# Patient Record
Sex: FEMALE | NOT HISPANIC OR LATINO | Employment: FULL TIME | URBAN - METROPOLITAN AREA
[De-identification: names, ages, dates, MRNs, and addresses within clinical notes are randomized per-mention and may not be internally consistent; named-entity substitution may affect disease eponyms.]

---

## 2017-04-27 ENCOUNTER — TELEPHONE (OUTPATIENT)
Dept: INTERNAL MEDICINE | Facility: CLINIC | Age: 52
End: 2017-04-27

## 2017-04-27 NOTE — TELEPHONE ENCOUNTER
4/27/2017    Call Regarding Preventive Health Screening Colonoscopy    Attempt 1    Message on voicemail     Comments:       Outreach   cnt

## 2017-05-02 NOTE — TELEPHONE ENCOUNTER
5/2/2017    Call Regarding Preventive Health Screening Colonoscopy    Attempt 2    Message on voicemail     Comments:       Outreach   CC

## 2017-05-08 NOTE — TELEPHONE ENCOUNTER
5/8/2017    Call Regarding Preventive Health Screening Colonoscopy    Attempt 3    Message on voicemail     Comments:       Outreach   diana

## 2017-06-27 ENCOUNTER — OFFICE VISIT (OUTPATIENT)
Dept: INTERNAL MEDICINE | Facility: CLINIC | Age: 52
End: 2017-06-27
Payer: COMMERCIAL

## 2017-06-27 ENCOUNTER — RADIANT APPOINTMENT (OUTPATIENT)
Dept: MAMMOGRAPHY | Facility: CLINIC | Age: 52
End: 2017-06-27
Attending: INTERNAL MEDICINE
Payer: COMMERCIAL

## 2017-06-27 VITALS
HEIGHT: 63 IN | BODY MASS INDEX: 33.89 KG/M2 | TEMPERATURE: 97.8 F | SYSTOLIC BLOOD PRESSURE: 120 MMHG | HEART RATE: 101 BPM | WEIGHT: 191.3 LBS | DIASTOLIC BLOOD PRESSURE: 82 MMHG | OXYGEN SATURATION: 98 %

## 2017-06-27 DIAGNOSIS — Z00.00 ENCOUNTER FOR ROUTINE ADULT HEALTH EXAMINATION WITHOUT ABNORMAL FINDINGS: Primary | ICD-10-CM

## 2017-06-27 DIAGNOSIS — Z12.31 VISIT FOR SCREENING MAMMOGRAM: ICD-10-CM

## 2017-06-27 DIAGNOSIS — I10 ESSENTIAL HYPERTENSION, BENIGN: ICD-10-CM

## 2017-06-27 DIAGNOSIS — Z12.11 COLON CANCER SCREENING: ICD-10-CM

## 2017-06-27 DIAGNOSIS — Z65.9 OTHER SOCIAL STRESSOR: ICD-10-CM

## 2017-06-27 DIAGNOSIS — Z11.59 NEED FOR HEPATITIS C SCREENING TEST: ICD-10-CM

## 2017-06-27 DIAGNOSIS — E78.5 HYPERLIPIDEMIA LDL GOAL <160: ICD-10-CM

## 2017-06-27 LAB
ALBUMIN SERPL-MCNC: 3.5 G/DL (ref 3.4–5)
ALP SERPL-CCNC: 79 U/L (ref 40–150)
ALT SERPL W P-5'-P-CCNC: 23 U/L (ref 0–50)
ANION GAP SERPL CALCULATED.3IONS-SCNC: 7 MMOL/L (ref 3–14)
AST SERPL W P-5'-P-CCNC: 11 U/L (ref 0–45)
BILIRUB SERPL-MCNC: 0.4 MG/DL (ref 0.2–1.3)
BUN SERPL-MCNC: 18 MG/DL (ref 7–30)
CALCIUM SERPL-MCNC: 8.8 MG/DL (ref 8.5–10.1)
CHLORIDE SERPL-SCNC: 106 MMOL/L (ref 94–109)
CHOLEST SERPL-MCNC: 172 MG/DL
CO2 SERPL-SCNC: 28 MMOL/L (ref 20–32)
CREAT SERPL-MCNC: 0.83 MG/DL (ref 0.52–1.04)
GFR SERPL CREATININE-BSD FRML MDRD: 72 ML/MIN/1.7M2
GLUCOSE SERPL-MCNC: 104 MG/DL (ref 70–99)
HCV AB SERPL QL IA: NORMAL
HDLC SERPL-MCNC: 47 MG/DL
HGB BLD-MCNC: 13.3 G/DL (ref 11.7–15.7)
LDLC SERPL CALC-MCNC: 85 MG/DL
NONHDLC SERPL-MCNC: 125 MG/DL
POTASSIUM SERPL-SCNC: 3.7 MMOL/L (ref 3.4–5.3)
PROT SERPL-MCNC: 7.7 G/DL (ref 6.8–8.8)
SODIUM SERPL-SCNC: 141 MMOL/L (ref 133–144)
TRIGL SERPL-MCNC: 199 MG/DL

## 2017-06-27 PROCEDURE — 99396 PREV VISIT EST AGE 40-64: CPT | Performed by: INTERNAL MEDICINE

## 2017-06-27 PROCEDURE — 80061 LIPID PANEL: CPT | Performed by: INTERNAL MEDICINE

## 2017-06-27 PROCEDURE — 36415 COLL VENOUS BLD VENIPUNCTURE: CPT | Performed by: INTERNAL MEDICINE

## 2017-06-27 PROCEDURE — 86803 HEPATITIS C AB TEST: CPT | Performed by: INTERNAL MEDICINE

## 2017-06-27 PROCEDURE — 80053 COMPREHEN METABOLIC PANEL: CPT | Performed by: INTERNAL MEDICINE

## 2017-06-27 PROCEDURE — G0202 SCR MAMMO BI INCL CAD: HCPCS | Mod: TC

## 2017-06-27 PROCEDURE — 85018 HEMOGLOBIN: CPT | Performed by: INTERNAL MEDICINE

## 2017-06-27 NOTE — PROGRESS NOTES
SUBJECTIVE:   CC: Jimmy Stevens is an 51 year old woman who presents for preventive health visit.     Healthy Habits:    Do you get at least three servings of calcium containing foods daily (dairy, green leafy vegetables, etc.)? yes    Amount of exercise or daily activities, outside of work: 2 day(s) per week    Problems taking medications regularly No    Medication side effects: No    Have you had an eye exam in the past two years? yes    Do you see a dentist twice per year? yes  Do you have sleep apnea, excessive snoring or daytime drowsiness?no      PROBLEMS TO ADD ON...  1. Left lower back pain that burns and radiates up into arm area and down left leg. Also experiences bilateral leg pains x 2+ months   2.  She still has issues with her ex .    Today's PHQ-2 Score:   PHQ-2 ( 1999 [employer]) 6/27/2017 10/13/2016   Q1: Little interest or pleasure in doing things 0 0   Q2: Feeling down, depressed or hopeless 0 0   PHQ-2 Score 0 0       Abuse: Current or Past(Physical, Sexual or Emotional)- No  Do you feel safe in your environment - Yes    Social History   Substance Use Topics     Smoking status: Never Smoker     Smokeless tobacco: Never Used     Alcohol use No     The patient does not drink >3 drinks per day nor >7 drinks per week.    Reviewed orders with patient.  Reviewed health maintenance and updated orders accordingly - Yes    Patient over age 50, mutual decision to screen reflected in health maintenance.    Pertinent mammograms are reviewed under the imaging tab.  History of abnormal Pap smear: NO - age 30- 65 PAP every 3 years recommended    Reviewed and updated as needed this visit by clinical staff  Tobacco  Allergies  Med Hx  Surg Hx  Fam Hx  Soc Hx        Reviewed and updated as needed this visit by Provider         ROS:  C: NEGATIVE for fever, chills, change in weight  ENT: NEGATIVE for ear, mouth and throat problems  R: NEGATIVE for significant cough or SOB  CV: NEGATIVE for chest  "pain, palpitations or peripheral edema  GI: NEGATIVE for nausea, abdominal pain, heartburn, or change in bowel habits  : NEGATIVE for unusual urinary or vaginal symptoms. No vaginal bleeding.  M: NEGATIVE for significant arthralgias or myalgia  P: NEGATIVE for changes in mood or affect less anxiety.    OBJECTIVE:   /82  Pulse 101  Temp 97.8  F (36.6  C) (Oral)  Ht 5' 3\" (1.6 m)  Wt 191 lb 4.8 oz (86.8 kg)  SpO2 98%  BMI 33.89 kg/m2  EXAM:  GENERAL APPEARANCE: healthy, alert and no distress  EYES: Eyes grossly normal to inspection, PERRL and conjunctivae and sclerae normal  HENT: ear canals and TM's normal, nose and mouth without ulcers or lesions, oropharynx clear and oral mucous membranes moist  NECK: no adenopathy, no asymmetry, masses, or scars and thyroid normal to palpation  RESP: lungs clear to auscultation - no rales, rhonchi or wheezes  BREAST: deferred per patient  CV: regular rate and rhythm, normal S1 S2, no S3 or S4, no click or rub, no peripheral edema and peripheral pulses strong  ABDOMEN: soft, nontender, no hepatosplenomegaly, no masses and bowel sounds normal  MS: no musculoskeletal defects are noted and gait is age appropriate without ataxia  PSYCH: anxiousness noted    ASSESSMENT/PLAN:   1. Encounter for routine adult health examination without abnormal findings  As ordered  - Hemoglobin  - Lipid Profile    2. Essential hypertension, benign  Stable on therapy  - Comprehensive metabolic panel    3. Hyperlipidemia LDL goal <160  Labs as fasting  - Comprehensive metabolic panel  - Lipid Profile    4. Visit for screening mammogram  Ordered as screening  - *MA Screening Digital Bilateral; Future    5. Colon cancer screening  ADVISED COLONOSCOPY FOR ROUTINE PREVENTATIVE CARE.  - Fecal colorectal cancer screen (FIT); Future    6. Other social stressor  patietn still having issues with her ex  and advised that she really should be considering counseling for issues there of    7. Need " "for hepatitis C screening test  As screening  - Hepatitis C antibody    COUNSELING:   Reviewed preventive health counseling, as reflected in patient instructions       Regular exercise       Healthy diet/nutrition     reports that she has never smoked. She has never used smokeless tobacco.    Estimated body mass index is 33.66 kg/(m^2) as calculated from the following:    Height as of 10/13/16: 5' 3\" (1.6 m).    Weight as of 10/13/16: 190 lb (86.2 kg).       Counseling Resources:  ATP IV Guidelines  Pooled Cohorts Equation Calculator  Breast Cancer Risk Calculator  FRAX Risk Assessment  ICSI Preventive Guidelines  Dietary Guidelines for Americans, 2010  Colto's MyPlate  ASA Prophylaxis  Lung CA Screening    Nick Calderon MD  [facility]    THE MEDICATION LIST HAS BEEN FULLY RECONCILED BY THE M.D. AND THE NURSING STAFF.    "

## 2017-06-27 NOTE — MR AVS SNAPSHOT
After Visit Summary   6/27/2017    Jimmy Stevens    MRN: [id number]           Patient Information     Date Of Birth          1965        Visit Information        Provider Department      6/27/2017 8:40 AM Nick Calderon MD [facility]        Today's Diagnoses     Encounter for routine adult health examination without abnormal findings    -  1    Essential hypertension, benign        Hyperlipidemia LDL goal <160        Visit for screening mammogram        Colon cancer screening        Other social stressor        Need for hepatitis C screening test          Care Instructions      Preventive Health Recommendations  Female Ages 50 - 64    Yearly exam: See your health care provider every year in order to  o Review health changes.   o Discuss preventive care.    o Review your medicines if your doctor has prescribed any.      Get a Pap test every three years (unless you have an abnormal result and your provider advises testing more often).    If you get Pap tests with HPV test, you only need to test every 5 years, unless you have an abnormal result.     You do not need a Pap test if your uterus was removed (hysterectomy) and you have not had cancer.    You should be tested each year for STDs (sexually transmitted diseases) if you're at risk.     Have a mammogram every 1 to 2 years.    Have a colonoscopy at age 50, or have a yearly FIT test (stool test). These exams screen for colon cancer.      Have a cholesterol test every 5 years, or more often if advised.    Have a diabetes test (fasting glucose) every three years. If you are at risk for diabetes, you should have this test more often.     If you are at risk for osteoporosis (brittle bone disease), think about having a bone density scan (DEXA).    Shots: Get a flu shot each year. Get a tetanus shot every 10 years.    Nutrition:     Eat at least 5 servings of fruits and vegetables each day.    Eat whole-grain bread,  whole-wheat pasta and brown rice instead of white grains and rice.    Talk to your provider about Calcium and Vitamin D.     Lifestyle    Exercise at least 150 minutes a week (30 minutes a day, 5 days a week). This will help you control your weight and prevent disease.    Limit alcohol to one drink per day.    No smoking.     Wear sunscreen to prevent skin cancer.     See your dentist every six months for an exam and cleaning.    See your eye doctor every 1 to 2 years.            Follow-ups after your visit        Future tests that were ordered for you today     Open Future Orders        Priority Expected Expires Ordered    Fecal colorectal cancer screen (FIT) Routine 7/18/2017 9/19/2017 6/27/2017            Who to contact     If you have questions or need follow up information about today's clinic visit or your schedule please contact [facility] directly at [phone].  Normal or non-critical lab and imaging results will be communicated to you by Konterahart, letter or phone within 4 business days after the clinic has received the results. If you do not hear from us within 7 days, please contact the clinic through Trendratingt or phone. If you have a critical or abnormal lab result, we will notify you by phone as soon as possible.  Submit refill requests through Locaweb or call your pharmacy and they will forward the refill request to us. Please allow 3 business days for your refill to be completed.          Additional Information About Your Visit        MyChart Information     Locaweb gives you secure access to your electronic health record. If you see a primary care provider, you can also send messages to your care team and make appointments. If you have questions, please call your primary care clinic.  If you do not have a primary care provider, please call [phone] and they will assist you.        Care EveryWhere ID     This is your Care EveryWhere ID. This could be used by other  "organizations to access your [facility] medical records  [id number]        Your Vitals Were     Pulse Temperature Height Pulse Oximetry BMI (Body Mass Index)       101 97.8  F (36.6  C) (Oral) 5' 3\" (1.6 m) 98% 33.89 kg/m2        Blood Pressure from Last 3 Encounters:   06/27/17 120/82   10/17/16 (!) 140/102   10/13/16 136/84    Weight from Last 3 Encounters:   06/27/17 191 lb 4.8 oz (86.8 kg)   10/13/16 190 lb (86.2 kg)   06/26/16 175 lb (79.4 kg)              We Performed the Following     Comprehensive metabolic panel     Hemoglobin     Hepatitis C antibody     Lipid Profile        Primary Care Provider Office Phone # Fax #    Nick Calderon [phone]       [facility] [address] [facility] [address]        Equal Access to Services     SANDY ABBASI : Hadii aad ku hadasho Soomaali, waaxda luqadaha, qaybta kaalmada adeegyada, waxay idiin hayaan alka adam . So [facility] [phone].    ATENCIÓN: Si habla español, tiene a de la torre disposición servicios gratuitos de asistencia lingüística. Llame al [phone].    We comply with applicable federal civil rights laws and [place] laws. We do not discriminate on the basis of race, color, national origin, age, disability sex, sexual orientation or gender identity.            Thank you!     Thank you for choosing [facility]  for your care. Our goal is always to provide you with excellent care. Hearing back from our patients is one way we can continue to improve our services. Please take a few minutes to complete the written survey that you may receive in the mail after your visit with us. Thank you!             Your Updated Medication List - Protect others around you: Learn how to safely use, store and throw away your medicines at www.disposemymeds.org.          This list is accurate as of: 6/27/17  9:45 AM.  Always use your most recent med list.                   Brand Name Dispense Instructions for use " Diagnosis    fluticasone 50 MCG/ACT spray    FLONASE    1 Bottle    Spray 1-2 sprays into both nostrils daily    Sinus disorder

## 2017-06-27 NOTE — LETTER
[facility]  [address]  [address]      June 28, 2017      Jimmy Stevens  [address]   [place] [facility] [address]          Dear Jimmy,      I have enclosed a copy of your most recent labs done here at the clinic and if available some of your prior labs for comparison.     I am pleased to inform you that your routine blood work including your hemoglobin, hepatitis C, sodium, potassium, calcium, kidney and liver function tests are all normal.    Your cholesterol looks good and I would not change anything at this point but would repeat your labs in 12 months.    Your blood sugar function tests are slightly abnormal and elevated and should be rechecked here in the clinic in 6 months with a follow-up visit with me, fasting.  I will look forward to seeing you at that time and please call to make an appointment.  In the meantime, please work on your diet limiting your carbohydrates and getting some good, regular exercise.    Please call me if you have further questions.        Nick Calderon MD

## 2017-06-27 NOTE — NURSING NOTE
"Chief Complaint   Patient presents with     Physical       Initial /82  Pulse 101  Temp 97.8  F (36.6  C) (Oral)  Ht 5' 3\" (1.6 m)  Wt 191 lb 4.8 oz (86.8 kg)  SpO2 98%  BMI 33.89 kg/m2 Estimated body mass index is 33.89 kg/(m^2) as calculated from the following:    Height as of this encounter: 5' 3\" (1.6 m).    Weight as of this encounter: 191 lb 4.8 oz (86.8 kg).  Medication Reconciliation: complete   Madeleine Bradley CMA      "

## 2017-06-28 PROCEDURE — 82274 ASSAY TEST FOR BLOOD FECAL: CPT | Performed by: INTERNAL MEDICINE

## 2017-06-30 DIAGNOSIS — Z12.11 COLON CANCER SCREENING: ICD-10-CM

## 2017-06-30 LAB — HEMOCCULT STL QL IA: NEGATIVE

## 2017-11-29 ENCOUNTER — RADIANT APPOINTMENT (OUTPATIENT)
Dept: GENERAL RADIOLOGY | Facility: CLINIC | Age: 52
End: 2017-11-29
Attending: INTERNAL MEDICINE
Payer: COMMERCIAL

## 2017-11-29 ENCOUNTER — OFFICE VISIT (OUTPATIENT)
Dept: INTERNAL MEDICINE | Facility: CLINIC | Age: 52
End: 2017-11-29
Payer: COMMERCIAL

## 2017-11-29 VITALS
HEART RATE: 105 BPM | OXYGEN SATURATION: 98 % | BODY MASS INDEX: 33.92 KG/M2 | SYSTOLIC BLOOD PRESSURE: 122 MMHG | TEMPERATURE: 98 F | DIASTOLIC BLOOD PRESSURE: 80 MMHG | WEIGHT: 191.5 LBS

## 2017-11-29 DIAGNOSIS — I10 ESSENTIAL HYPERTENSION, BENIGN: Primary | ICD-10-CM

## 2017-11-29 DIAGNOSIS — M54.6 LEFT-SIDED THORACIC BACK PAIN, UNSPECIFIED CHRONICITY: ICD-10-CM

## 2017-11-29 DIAGNOSIS — M25.561 CHRONIC PAIN OF RIGHT KNEE: ICD-10-CM

## 2017-11-29 DIAGNOSIS — G89.29 CHRONIC PAIN OF RIGHT KNEE: ICD-10-CM

## 2017-11-29 LAB
ANION GAP SERPL CALCULATED.3IONS-SCNC: 6 MMOL/L (ref 3–14)
BUN SERPL-MCNC: 19 MG/DL (ref 7–30)
CALCIUM SERPL-MCNC: 9.1 MG/DL (ref 8.5–10.1)
CHLORIDE SERPL-SCNC: 106 MMOL/L (ref 94–109)
CO2 SERPL-SCNC: 29 MMOL/L (ref 20–32)
CREAT SERPL-MCNC: 0.76 MG/DL (ref 0.52–1.04)
GFR SERPL CREATININE-BSD FRML MDRD: 80 ML/MIN/1.7M2
GLUCOSE SERPL-MCNC: 107 MG/DL (ref 70–99)
POTASSIUM SERPL-SCNC: 4.3 MMOL/L (ref 3.4–5.3)
SODIUM SERPL-SCNC: 141 MMOL/L (ref 133–144)

## 2017-11-29 PROCEDURE — 73560 X-RAY EXAM OF KNEE 1 OR 2: CPT | Mod: RT

## 2017-11-29 PROCEDURE — 36415 COLL VENOUS BLD VENIPUNCTURE: CPT | Performed by: INTERNAL MEDICINE

## 2017-11-29 PROCEDURE — 80048 BASIC METABOLIC PNL TOTAL CA: CPT | Performed by: INTERNAL MEDICINE

## 2017-11-29 PROCEDURE — 99214 OFFICE O/P EST MOD 30 MIN: CPT | Performed by: INTERNAL MEDICINE

## 2017-11-29 RX ORDER — OMEGA-3 FATTY ACIDS/FISH OIL 300-1000MG
200 CAPSULE ORAL PRN
COMMUNITY
End: 2018-10-25

## 2017-11-29 NOTE — NURSING NOTE
"Chief Complaint   Patient presents with     Glucose       Initial /80  Pulse 105  Temp 98  F (36.7  C) (Oral)  Wt 191 lb 8 oz (86.9 kg)  SpO2 98%  BMI 33.92 kg/m2 Estimated body mass index is 33.92 kg/(m^2) as calculated from the following:    Height as of 6/27/17: 5' 3\" (1.6 m).    Weight as of this encounter: 191 lb 8 oz (86.9 kg).  Medication Reconciliation: complete   Madeleine Bradley, JALEN      "

## 2017-11-29 NOTE — PROGRESS NOTES
SUBJECTIVE:   Jimmy Stevens is a 52 year old female who presents to clinic today for the following health issues:    Recheck glucose from 6/27/17    Other concerns:  1. Intermittent episodes of burning sensation on left side of back that radiates down    Problem list and histories reviewed & adjusted, as indicated.  Additional history: as documented    Patient Active Problem List   Diagnosis     iamCERVICALGIA     iamTENSION HEADACHE     iamPAIN IN LIMB     Acute stress reaction     Knee pain     HYPERLIPIDEMIA LDL GOAL <160     Essential hypertension, benign     Past Surgical History:   Procedure Laterality Date     C NONSPECIFIC PROCEDURE  10/29/99    MRI brain normal     C NONSPECIFIC PROCEDURE  02/00    B breast reduction surgery     C TOTAL ABDOM HYSTERECTOMY  3/99    Hysterectomy, Total Abdominal     HC REMOVAL OF OVARY/TUBE(S)  3/99    Salpingo-Oophorectomy, Unilateral     HYSTERECTOMY, PAP NO LONGER INDICATED  3/99       Social History   Substance Use Topics     Smoking status: Never Smoker     Smokeless tobacco: Never Used     Alcohol use No     Family History   Problem Relation Age of Onset     Genitourinary Problems Mother      B:1942 Alive     Family History Negative Father      B:1940 Alive     Family History Negative Sister      5 sisters all healthy     Family History Negative Brother      1 brother healthy         Current Outpatient Prescriptions   Medication Sig Dispense Refill     fluticasone (FLONASE) 50 MCG/ACT nasal spray Spray 1-2 sprays into both nostrils daily 1 Bottle 3     Allergies   Allergen Reactions     H2 Antagonists      Mylan (itching)     Minocycline      BP Readings from Last 3 Encounters:   06/27/17 120/82   10/17/16 (!) 140/102   10/13/16 136/84    Wt Readings from Last 3 Encounters:   06/27/17 191 lb 4.8 oz (86.8 kg)   10/13/16 190 lb (86.2 kg)   06/26/16 175 lb (79.4 kg)         Labs reviewed in EPIC    Reviewed and updated as needed this visit by clinical staffTobacco   Allergies  Med Hx  Surg Hx  Fam Hx  Soc Hx      Reviewed and updated as needed this visit by Provider         ROS:  C: NEGATIVE for fever, chills, change in weight  E/M: NEGATIVE for ear, mouth and throat problems  R: NEGATIVE for significant cough or SOB  CV: NEGATIVE for chest pain, palpitations or peripheral edema  GI: NEGATIVE for nausea, abdominal pain, heartburn, or change in bowel habits  : NEGATIVE for frequency, dysuria, or hematuria  M: NEGATIVE for significant arthralgias or myalgia less R>L knee with noted pain and noted prior steroid shot in [facility] 5 years ago.    H: NEGATIVE for bleeding problems  P: NEGATIVE for changes in mood or affect less stressors from prior marriage.    OBJECTIVE:                                                    /80  Pulse 105  Temp 98  F (36.7  C) (Oral)  Wt 191 lb 8 oz (86.9 kg)  SpO2 98%  BMI 33.92 kg/m2  Body mass index is 33.92 kg/(m^2).  GENERAL: alert and no distress  EYES: Eyes grossly normal to inspection, extraocular movements - intact, and PERRL  HENT: ear canals- normal; TMs- normal; Nose- normal; Mouth- no ulcers, no lesions  NECK: no tenderness, no adenopathy, no asymmetry, no masses, no stiffness; thyroid- normal to palpation  RESP: lungs clear to auscultation - no rales, no rhonchi, no wheezes  CV: regular rates and rhythm, normal S1 S2, no S3 or S4 and no click or rub   MS: extremities- no gross deformities noted less milt antalgic gait, R>L knee, no edema less mild L>R thoracic back pain  NEURO: no focal changes  PSYCH: Alert and oriented times 3; speech- coherent , normal rate and volume; able to articulate logical thoughts, able to abstract reason, no tangential thoughts, no hallucinations or delusions, affect- normal     ASSESSMENT/PLAN:                                                      (I10) Essential hypertension, benign  (primary encounter diagnosis)  Comment: stable as noted on dietary therapy  Plan: Basic metabolic panel             (M54.6) Left-sided thoracic back pain, unspecified chronicity  Comment: suggested trial of PT and she has agreed  Plan: [facility], AND CHIROPRACTIC REFERRAL            (M25.561,  G89.29) Chronic pain of right knee  Comment: noted DJD changes on Xray dated 2006, suspect worse now, may benefit fro injection therapy  Plan: ORTHO  REFERRAL, XR Knee Right 1/2         Views            ADVISED FLU shot and COLONOSCOPY FOR ROUTINE PREVENTATIVE CARE.    See Patient Instructions    Nick Calderon MD  [facility]    THE MEDICATION LIST HAS BEEN FULLY RECONCILED BY THE M.D. AND THE NURSING STAFF.    25 minutes spent with this patient, face to face, discussing treatment options for listed problems above as well as side effects of appropriate medications.  Counseling time extended beyond 50% of the clinic visit.  Medication dosing, treatment plan and follow-up were discussed. Also reviewed need for primary care testing for patient.

## 2017-11-29 NOTE — LETTER
[facility]  [address] [address]  [phone]      11/29/2017       Jimmy Stevens  [address]  [place] [place] [address]        Dear Jimmy,    Your basic panel is stable.    Your blood sugar function test remains slightly abnormal and elevated and should be rechecked here in the clinic in 6 months with a follow-up visit with me, fasting.  I will look forward to seeing you at that time and please call to make an appointment.  In the meantime, please work on your diet limiting your carbohydrates and getting some good, regular exercise.    Sincerely,      Nick Calderon MD  Internal Medicine
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
## 2018-10-25 ENCOUNTER — OFFICE VISIT (OUTPATIENT)
Dept: INTERNAL MEDICINE | Facility: CLINIC | Age: 53
End: 2018-10-25
Payer: COMMERCIAL

## 2018-10-25 VITALS
OXYGEN SATURATION: 96 % | WEIGHT: 194.7 LBS | TEMPERATURE: 98.1 F | BODY MASS INDEX: 34.5 KG/M2 | HEIGHT: 63 IN | SYSTOLIC BLOOD PRESSURE: 130 MMHG | RESPIRATION RATE: 14 BRPM | DIASTOLIC BLOOD PRESSURE: 78 MMHG | HEART RATE: 106 BPM

## 2018-10-25 DIAGNOSIS — Z12.31 VISIT FOR SCREENING MAMMOGRAM: ICD-10-CM

## 2018-10-25 DIAGNOSIS — Z12.11 SCREEN FOR COLON CANCER: ICD-10-CM

## 2018-10-25 DIAGNOSIS — I10 ESSENTIAL HYPERTENSION, BENIGN: ICD-10-CM

## 2018-10-25 DIAGNOSIS — Z23 NEED FOR PROPHYLACTIC VACCINATION AND INOCULATION AGAINST INFLUENZA: ICD-10-CM

## 2018-10-25 DIAGNOSIS — Z00.00 ENCOUNTER FOR ROUTINE ADULT HEALTH EXAMINATION WITHOUT ABNORMAL FINDINGS: Primary | ICD-10-CM

## 2018-10-25 DIAGNOSIS — E78.5 HYPERLIPIDEMIA LDL GOAL <160: ICD-10-CM

## 2018-10-25 DIAGNOSIS — Z12.4 SCREENING FOR MALIGNANT NEOPLASM OF CERVIX: ICD-10-CM

## 2018-10-25 LAB
ALBUMIN SERPL-MCNC: 3.6 G/DL (ref 3.4–5)
ALP SERPL-CCNC: 70 U/L (ref 40–150)
ALT SERPL W P-5'-P-CCNC: 22 U/L (ref 0–50)
ANION GAP SERPL CALCULATED.3IONS-SCNC: 7 MMOL/L (ref 3–14)
AST SERPL W P-5'-P-CCNC: 12 U/L (ref 0–45)
BILIRUB SERPL-MCNC: 0.5 MG/DL (ref 0.2–1.3)
BUN SERPL-MCNC: 11 MG/DL (ref 7–30)
CALCIUM SERPL-MCNC: 9.1 MG/DL (ref 8.5–10.1)
CHLORIDE SERPL-SCNC: 104 MMOL/L (ref 94–109)
CHOLEST SERPL-MCNC: 157 MG/DL
CO2 SERPL-SCNC: 30 MMOL/L (ref 20–32)
CREAT SERPL-MCNC: 0.78 MG/DL (ref 0.52–1.04)
GFR SERPL CREATININE-BSD FRML MDRD: 77 ML/MIN/1.7M2
GLUCOSE SERPL-MCNC: 100 MG/DL (ref 70–99)
HDLC SERPL-MCNC: 40 MG/DL
HGB BLD-MCNC: 13.5 G/DL (ref 11.7–15.7)
LDLC SERPL CALC-MCNC: 82 MG/DL
NONHDLC SERPL-MCNC: 117 MG/DL
POTASSIUM SERPL-SCNC: 4.2 MMOL/L (ref 3.4–5.3)
PROT SERPL-MCNC: 7.9 G/DL (ref 6.8–8.8)
SODIUM SERPL-SCNC: 141 MMOL/L (ref 133–144)
TRIGL SERPL-MCNC: 174 MG/DL

## 2018-10-25 PROCEDURE — 99396 PREV VISIT EST AGE 40-64: CPT | Performed by: INTERNAL MEDICINE

## 2018-10-25 PROCEDURE — 36415 COLL VENOUS BLD VENIPUNCTURE: CPT | Performed by: INTERNAL MEDICINE

## 2018-10-25 PROCEDURE — 80053 COMPREHEN METABOLIC PANEL: CPT | Performed by: INTERNAL MEDICINE

## 2018-10-25 PROCEDURE — 85018 HEMOGLOBIN: CPT | Performed by: INTERNAL MEDICINE

## 2018-10-25 PROCEDURE — 80061 LIPID PANEL: CPT | Performed by: INTERNAL MEDICINE

## 2018-10-25 NOTE — LETTER
[facility]  [address] [address]  [phone]      10/25/2018       Jimmy Stevens  [address]  [place] [address]        Dear Jimmy,    I am pleased to inform you that your routine blood work including your hemoglobin, sodium, potassium, calcium, kidney and liver function tests are all normal.    Your cholesterol looks stable and I would not change anything at this point but would repeat your labs in 12 months.    Your blood sugar function tests are slightly abnormal and elevated and should be rechecked here in the clinic in 12 months with a follow-up visit with me, fasting.  I will look forward to seeing you at that time and please call to make an appointment.  In the meantime, please work on your diet limiting your carbohydrates and getting some good, regular exercise.    Sincerely,      Nick Calderon MD  Internal Medicine

## 2018-10-25 NOTE — MR AVS SNAPSHOT
After Visit Summary   10/25/2018    Jimmy Stevens    MRN: [id number]           Patient Information     Date Of Birth          1965        Visit Information        Provider Department      10/25/2018 7:20 AM Nick Calderon MD [facility]        Today's Diagnoses     Encounter for routine adult health examination without abnormal findings    -  1    Essential hypertension, benign        Hyperlipidemia LDL goal <160        Screen for colon cancer        Screening for malignant neoplasm of cervix        Need for prophylactic vaccination and inoculation against influenza        Visit for screening mammogram          Care Instructions      Preventive Health Recommendations  Female Ages 50 - 64    Yearly exam: See your health care provider every year in order to  o Review health changes.   o Discuss preventive care.    o Review your medicines if your doctor has prescribed any.      Get a Pap test every three years (unless you have an abnormal result and your provider advises testing more often).    If you get Pap tests with HPV test, you only need to test every 5 years, unless you have an abnormal result.     You do not need a Pap test if your uterus was removed (hysterectomy) and you have not had cancer.    You should be tested each year for STDs (sexually transmitted diseases) if you're at risk.     Have a mammogram every 1 to 2 years.    Have a colonoscopy at age 50, or have a yearly FIT test (stool test). These exams screen for colon cancer.      Have a cholesterol test every 5 years, or more often if advised.    Have a diabetes test (fasting glucose) every three years. If you are at risk for diabetes, you should have this test more often.     If you are at risk for osteoporosis (brittle bone disease), think about having a bone density scan (DEXA).    Shots: Get a flu shot each year. Get a tetanus shot every 10 years.    Nutrition:     Eat at least 5 servings of fruits  and vegetables each day.    Eat whole-grain bread, whole-wheat pasta and brown rice instead of white grains and rice.    Get adequate Calcium and Vitamin D.     Lifestyle    Exercise at least 150 minutes a week (30 minutes a day, 5 days a week). This will help you control your weight and prevent disease.    Limit alcohol to one drink per day.    No smoking.     Wear sunscreen to prevent skin cancer.     See your dentist every six months for an exam and cleaning.    See your eye doctor every 1 to 2 years.            Follow-ups after your visit        Follow-up notes from your care team     Return if symptoms worsen or fail to improve, for 6 month follow-up clinic visit.      Your next 10 appointments already scheduled     Oct 29, 2018  2:15 PM CDT   MA SCREENING DIGITAL BILATERAL [facility]    [address] [place] [id number] [phone]           How do I prepare for my exam? (Food and drink instructions) No Food and Drink Restrictions.  How do I prepare for my exam? (Other instructions) Do not use any powder, lotion or deodorant under your arms or on your breast. If you do, we will ask you to remove it before your exam.  What should I wear: Wear comfortable, two-piece clothing.  How long does the exam take: Most scans will take 15 minutes.  What should I bring: Bring any previous mammograms from other facilities or have them mailed to the breast center.  Do I need a :  No  is needed.  What do I need to tell my doctor: If you have any allergies, tell your care team.  What should I do after the exam: No restrictions, You may resume normal activities.  What is this test: This test is an x-ray of the breast to look for breast disease. The breast is pressed between two plates to flatten and spread the tissue. An X-ray is taken of the breast from different angles.  Who should I call with questions: If you have any  "questions, please call the Imaging Department where you will have your exam. Directions, parking instructions, and other information is available on our website, [email or web address].  Other information about my exam Three-dimensional (3D) mammograms are available at [facility] locations in formerly [facility], [facility], [place], [facility] and [place].  Health locations include [place] and [facility] in [place].  Benefits of 3D mammograms include * Improved rate of cancer detection * Decreases your chance of having to go back for more tests, which means fewer: * \"False-positive\" results (This means that there is an abnormal area but it isn't cancer.) * Invasive testing procedures, such as a biopsy or surgery * Can provide clearer images of the breast if you have dense breast tissue.  *3D mammography is an optional exam that anyone can have with a 2D mammogram. It doesn't replace or take the place of a 2D mammogram. 2D mammograms remain an effective screening test for all women.  Not all insurance companies cover the cost of a 3D mammogram. Check with your insurance. Three-dimensional (3D) mammograms are available at [facility] locations in formerly [facility], [facility], [facility], and [place]. Health locations include [facility] and [facility] in [place]. Benefits of 3D mammograms include: - Improved rate of cancer detection - Decreases your chance of having to go back for more tests, which means fewer: - \"False-positive\" results (This means that there is an abnormal area but it isn't cancer.) - Invasive testing procedures, such as a biopsy or surgery - Can provide clearer images of the breast if you have dense breast tissue. 3D mammography is an optional exam that anyone can have with a 2D mammogram. It doesn't replace or take the place of a 2D mammogram. 2D mammograms remain an effective screening test for " "all women.  Not all insurance companies cover the cost of a 3D mammogram. Check with your insurance.              Future tests that were ordered for you today     Open Future Orders        Priority Expected Expires Ordered    Fecal colorectal cancer screen (FIT) Routine 11/15/2018 1/17/2019 10/25/2018    *MA Screening Digital Bilateral Routine  10/25/2019 10/25/2018            Who to contact     If you have questions or need follow up information about today's clinic visit or your schedule please contact [facility] directly at [phone].  Normal or non-critical lab and imaging results will be communicated to you by DigitalMRhart, letter or phone within 4 business days after the clinic has received the results. If you do not hear from us within 7 days, please contact the clinic through GPNXt or phone. If you have a critical or abnormal lab result, we will notify you by phone as soon as possible.  Submit refill requests through Tidal or call your pharmacy and they will forward the refill request to us. Please allow 3 business days for your refill to be completed.          Additional Information About Your Visit        DigitalMRharSciona Information     Tidal gives you secure access to your electronic health record. If you see a primary care provider, you can also send messages to your care team and make appointments. If you have questions, please call your primary care clinic.  If you do not have a primary care provider, please call [phone] and they will assist you.        Care EveryWhere ID     This is your Care EveryWhere ID. This could be used by other organizations to access your [facility] medical records  [id number]        Your Vitals Were     Pulse Temperature Respirations Height Pulse Oximetry BMI (Body Mass Index)    106 98.1  F (36.7  C) 14 5' 3\" (1.6 m) 96% 34.49 kg/m2       Blood Pressure from Last 3 Encounters:   10/25/18 130/78   11/29/17 122/80   06/27/17 120/82    Weight from Last " 3 Encounters:   10/25/18 194 lb 11.2 oz (88.3 kg)   11/29/17 191 lb 8 oz (86.9 kg)   06/27/17 191 lb 4.8 oz (86.8 kg)              We Performed the Following     Comprehensive metabolic panel     Hemoglobin     Lipid Profile        Primary Care Provider Office Phone # Fax #    Nick Calderon [phone]       [address] [facility] [address]        Equal Access to Services     SANDY ABBASI : Hadii aad ku hadasho Soomaali, waaxda luqadaha, qaybta kaalmada adeegyada, waxay idiin hayaan adeeg kharash la'brandon . So [facility] [phone].    ATENCIÓN: Si habla español, tiene a de la torre disposición servicios gratuitos de asistencia lingüística. [facility] [phone].    We comply with applicable federal civil rights laws and [place] laws. We do not discriminate on the basis of race, color, national origin, age, disability, sex, sexual orientation, or gender identity.            Thank you!     Thank you for choosing [facility]  for your care. Our goal is always to provide you with excellent care. Hearing back from our patients is one way we can continue to improve our services. Please take a few minutes to complete the written survey that you may receive in the mail after your visit with us. Thank you!             Your Updated Medication List - Protect others around you: Learn how to safely use, store and throw away your medicines at www.disposemymeds.org.          This list is accurate as of 10/25/18  8:04 AM.  Always use your most recent med list.                   Brand Name Dispense Instructions for use Diagnosis    fluticasone 50 MCG/ACT spray    FLONASE    1 Bottle    Spray 1-2 sprays into both nostrils daily    Sinus disorder

## 2018-10-29 ENCOUNTER — RADIANT APPOINTMENT (OUTPATIENT)
Dept: MAMMOGRAPHY | Facility: CLINIC | Age: 53
End: 2018-10-29
Attending: INTERNAL MEDICINE
Payer: COMMERCIAL

## 2018-10-29 DIAGNOSIS — Z12.31 VISIT FOR SCREENING MAMMOGRAM: ICD-10-CM

## 2018-10-29 PROCEDURE — 77067 SCR MAMMO BI INCL CAD: CPT | Mod: TC

## 2019-04-11 ENCOUNTER — OFFICE VISIT (OUTPATIENT)
Dept: INTERNAL MEDICINE | Facility: CLINIC | Age: 54
End: 2019-04-11
Payer: COMMERCIAL

## 2019-04-11 VITALS
RESPIRATION RATE: 14 BRPM | WEIGHT: 199.1 LBS | HEART RATE: 98 BPM | SYSTOLIC BLOOD PRESSURE: 136 MMHG | TEMPERATURE: 98 F | OXYGEN SATURATION: 98 % | HEIGHT: 63 IN | DIASTOLIC BLOOD PRESSURE: 80 MMHG | BODY MASS INDEX: 35.28 KG/M2

## 2019-04-11 DIAGNOSIS — M54.50 CHRONIC BILATERAL LOW BACK PAIN WITHOUT SCIATICA: ICD-10-CM

## 2019-04-11 DIAGNOSIS — I10 ESSENTIAL HYPERTENSION, BENIGN: ICD-10-CM

## 2019-04-11 DIAGNOSIS — Z12.11 SCREEN FOR COLON CANCER: ICD-10-CM

## 2019-04-11 DIAGNOSIS — G89.29 CHRONIC BILATERAL LOW BACK PAIN WITHOUT SCIATICA: ICD-10-CM

## 2019-04-11 DIAGNOSIS — L98.491 SKIN ULCER, LIMITED TO BREAKDOWN OF SKIN (H): Primary | ICD-10-CM

## 2019-04-11 DIAGNOSIS — E66.01 MORBID OBESITY (H): ICD-10-CM

## 2019-04-11 PROCEDURE — 99214 OFFICE O/P EST MOD 30 MIN: CPT | Performed by: INTERNAL MEDICINE

## 2019-04-11 RX ORDER — CEPHALEXIN 500 MG/1
500 CAPSULE ORAL 3 TIMES DAILY
Qty: 21 CAPSULE | Refills: 0 | Status: SHIPPED | OUTPATIENT
Start: 2019-04-11 | End: 2019-11-27

## 2019-04-11 ASSESSMENT — MIFFLIN-ST. JEOR: SCORE: 1477.24
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
## 2019-11-22 ENCOUNTER — ANCILLARY PROCEDURE (OUTPATIENT)
Dept: MAMMOGRAPHY | Facility: CLINIC | Age: 54
End: 2019-11-22
Attending: INTERNAL MEDICINE
Payer: COMMERCIAL

## 2019-11-22 DIAGNOSIS — Z12.31 VISIT FOR SCREENING MAMMOGRAM: ICD-10-CM

## 2019-11-22 PROCEDURE — 77067 SCR MAMMO BI INCL CAD: CPT | Mod: TC

## 2019-11-22 PROCEDURE — 77063 BREAST TOMOSYNTHESIS BI: CPT | Mod: TC

## 2019-11-27 ENCOUNTER — OFFICE VISIT (OUTPATIENT)
Dept: INTERNAL MEDICINE | Facility: CLINIC | Age: 54
End: 2019-11-27
Payer: COMMERCIAL

## 2019-11-27 VITALS
HEART RATE: 109 BPM | DIASTOLIC BLOOD PRESSURE: 84 MMHG | BODY MASS INDEX: 36.1 KG/M2 | OXYGEN SATURATION: 97 % | SYSTOLIC BLOOD PRESSURE: 134 MMHG | WEIGHT: 203.8 LBS | RESPIRATION RATE: 15 BRPM

## 2019-11-27 DIAGNOSIS — I10 ESSENTIAL HYPERTENSION, BENIGN: ICD-10-CM

## 2019-11-27 DIAGNOSIS — E66.01 MORBID OBESITY (H): ICD-10-CM

## 2019-11-27 DIAGNOSIS — Z12.11 COLON CANCER SCREENING: ICD-10-CM

## 2019-11-27 DIAGNOSIS — R20.2 PARESTHESIA OF LEFT LEG: ICD-10-CM

## 2019-11-27 DIAGNOSIS — R20.2 PARESTHESIA AND PAIN OF LEFT EXTREMITY: ICD-10-CM

## 2019-11-27 DIAGNOSIS — M79.609 PARESTHESIA AND PAIN OF LEFT EXTREMITY: ICD-10-CM

## 2019-11-27 DIAGNOSIS — R21 RASH: ICD-10-CM

## 2019-11-27 DIAGNOSIS — E78.5 HYPERLIPIDEMIA LDL GOAL <160: ICD-10-CM

## 2019-11-27 DIAGNOSIS — Z00.00 ROUTINE GENERAL MEDICAL EXAMINATION AT A HEALTH CARE FACILITY: Primary | ICD-10-CM

## 2019-11-27 LAB
ALBUMIN SERPL-MCNC: 3.4 G/DL (ref 3.4–5)
ALP SERPL-CCNC: 75 U/L (ref 40–150)
ALT SERPL W P-5'-P-CCNC: 18 U/L (ref 0–50)
ANION GAP SERPL CALCULATED.3IONS-SCNC: 6 MMOL/L (ref 3–14)
AST SERPL W P-5'-P-CCNC: 10 U/L (ref 0–45)
BILIRUB SERPL-MCNC: 0.3 MG/DL (ref 0.2–1.3)
BUN SERPL-MCNC: 12 MG/DL (ref 7–30)
CALCIUM SERPL-MCNC: 8.8 MG/DL (ref 8.5–10.1)
CHLORIDE SERPL-SCNC: 105 MMOL/L (ref 94–109)
CHOLEST SERPL-MCNC: 173 MG/DL
CO2 SERPL-SCNC: 28 MMOL/L (ref 20–32)
CREAT SERPL-MCNC: 0.76 MG/DL (ref 0.52–1.04)
GFR SERPL CREATININE-BSD FRML MDRD: 89 ML/MIN/{1.73_M2}
GLUCOSE SERPL-MCNC: 110 MG/DL (ref 70–99)
HDLC SERPL-MCNC: 40 MG/DL
HGB BLD-MCNC: 13.5 G/DL (ref 11.7–15.7)
LDLC SERPL CALC-MCNC: 77 MG/DL
NONHDLC SERPL-MCNC: 133 MG/DL
POTASSIUM SERPL-SCNC: 4.1 MMOL/L (ref 3.4–5.3)
PROT SERPL-MCNC: 7.5 G/DL (ref 6.8–8.8)
SODIUM SERPL-SCNC: 139 MMOL/L (ref 133–144)
TRIGL SERPL-MCNC: 280 MG/DL

## 2019-11-27 PROCEDURE — 80061 LIPID PANEL: CPT | Performed by: INTERNAL MEDICINE

## 2019-11-27 PROCEDURE — 99396 PREV VISIT EST AGE 40-64: CPT | Performed by: INTERNAL MEDICINE

## 2019-11-27 PROCEDURE — 85018 HEMOGLOBIN: CPT | Performed by: INTERNAL MEDICINE

## 2019-11-27 PROCEDURE — 80053 COMPREHEN METABOLIC PANEL: CPT | Performed by: INTERNAL MEDICINE

## 2019-11-27 PROCEDURE — 36415 COLL VENOUS BLD VENIPUNCTURE: CPT | Performed by: INTERNAL MEDICINE

## 2019-11-27 PROCEDURE — 99214 OFFICE O/P EST MOD 30 MIN: CPT | Mod: 25 | Performed by: INTERNAL MEDICINE

## 2019-11-27 NOTE — PROGRESS NOTES
SUBJECTIVE:   CC: Jimmy Stevens is an 54 year old woman who presents for preventive health visit.     Answers for HPI/ROS submitted by the patient on 11/27/2019   Annual Exam:  Frequency of exercise:: 2-3 days/week  Getting at least 3 servings of Calcium per day:: Yes  Diet:: Regular (no restrictions), Low salt, Low fat/cholesterol, Carbohydrate counting, Vegetarian/vegan  Taking medications regularly:: Yes  Medication side effects:: Not applicable  Bi-annual eye exam:: Yes  Dental care twice a year:: NO  Sleep apnea or symptoms of sleep apnea:: None  Additional concerns today:: Yes  Duration of exercise:: 15-30 minutes      PROBLEMS TO ADD ON...  1. Left side burning sensation from neck all the way to left calf and left arm.  Patient states she has had these symptoms for months but has not been seen.  They are very atypical.  She reports pain and discomfort and tingling in her left arm and left leg not associated with any focality.  There is been no other changes in regards to speech or swallowing.  Patient tends to have a history of mild somatic complaints.  His ALT date back to the psychological issues that developed in her prior marriage and divorce.  2. Recheck skin ulcer on left foot, given Cephalexin in April but patient states no improvement with use. Using hydrogen peroxide and abx ointment.  When last seen she was given an oral antibiotic which did apparently improve the symptoms only to recur but she has not been seen or followed up in regards to this since that time.    Today's PHQ-2 Score:   PHQ-2 ( 1999 Pfizer) 10/25/2018 11/29/2017   Q1: Little interest or pleasure in doing things 0 0   Q2: Feeling down, depressed or hopeless 0 0   PHQ-2 Score 0 0       Abuse: Current or Past(Physical, Sexual or Emotional)- No  Do you feel safe in your environment? Yes        Social History     Tobacco Use     Smoking status: Never Smoker     Smokeless tobacco: Never Used   Substance Use Topics     Alcohol  use: No     If you drink alcohol do you typically have >3 drinks per day or >7 drinks per week? No                     Reviewed orders with patient.  Reviewed health maintenance and updated orders accordingly - Yes      Mammogram Screening: Patient over age 50, mutual decision to screen reflected in health maintenance.    Pertinent mammograms are reviewed under the imaging tab.  History of abnormal Pap smear: NO - age 30-65 PAP every 5 years with negative HPV co-testing recommended  PAP / HPV Latest Ref Rng & Units 3/10/2016 11/19/2007 5/18/2006   PAP - NIL NIL NIL   HPV 16 DNA NEG Negative - -   HPV 18 DNA NEG Negative - -   OTHER HR HPV NEG Negative - -     Reviewed and updated as needed this visit by clinical staff         Reviewed and updated as needed this visit by Provider         ROS:  CONSTITUTIONAL: NEGATIVE for fever, chills, mild increase noted with change in weight  EYES: NEGATIVE for vision changes or irritation  ENT: NEGATIVE for ear, mouth and throat problems  RESP: NEGATIVE for significant cough or SOB  BREAST: NEGATIVE for masses, tenderness or discharge  CV: NEGATIVE for chest pain, palpitations or peripheral edema  GI: NEGATIVE for nausea, abdominal pain, heartburn, or change in bowel habits  : NEGATIVE for unusual urinary or vaginal symptoms. No vaginal bleeding.  MUSCULOSKELETAL: NEGATIVE for significant arthralgias or myalgia  NEURO: NEGATIVE for weakness, dizziness   PSYCHIATRIC: NEGATIVE for changes in mood or affect     OBJECTIVE:   /84   Pulse 109   Resp 15   Wt 92.4 kg (203 lb 12.8 oz)   SpO2 97%   BMI 36.10 kg/m    EXAM:  GENERAL:  alert and no distress  EYES: Eyes grossly normal to inspection, PERRL and conjunctivae and sclerae normal  HENT: ear canals and TM's normal, nose and mouth without ulcers or lesions  NECK: no adenopathy, no asymmetry, masses, or scars and thyroid normal to palpation  RESP: lungs clear to auscultation - no rales, rhonchi or wheezes  BREAST: declined  per patient  CV: regular rate and rhythm, normal S1 S2, no S3 or S4, no murmur, click or rub, no peripheral edema and peripheral pulses strong  ABDOMEN: obese, soft, nontender, no hepatosplenomegaly, no masses and bowel sounds normal  MS: no gross musculoskeletal defects noted, no edema  NEURO: Normal strength and tone, mentation intact and speech normal.  She ambulates without difficulty.  Her speech is fluent.  Cranial nerves II through XII are intact.  Skin:  There is a superficial skin chnage, 3 cm by 3 cm, noted to the plantar aspect, arch, of the left foot.  This demonstrates mild granulation tissue with no surrounding superficial cellulitic change.  There is mild skin hyperpigmentation changes noted in this area.  PSYCH: mentation appears normal, affect normal/bright      ASSESSMENT/PLAN:   1. Routine general medical examination at a health care facility  Advised flu vaccination and shingles vaccination both of which were declined  - C RIV4 (FLUBLOK) VACCINE RECOMBINANT DNA PRSRV ANTIBIO FREE, IM [11971]  - Hemoglobin  - Comprehensive metabolic panel  - Lipid Profile    2. Essential hypertension, benign  Stable at present time continue with current observation and recheck  - Comprehensive metabolic panel    3. Hyperlipidemia LDL goal <160  Labs ordered as fasting per routine  - Lipid Profile    4. Obesity (BMI 35.0-39.9) with comorbidity (H)  Encouraged ongoing weight loss and reviewed weight curve with patient    5. Colon cancer screening  Advised colonoscopy for routine preventative care patient has declined in the past  - Fecal colorectal cancer screen (FIT); Future ordered again.,  Patient states that she turned it in but there is no documentation of the fit test as of last year.    6. Paresthesia and pain of left extremity  Very atypical symptoms without any evidence of focality on exam.  At this point based on her symptomatology I would suggest an MRI of her head and neck to rule out a structural source  "for her complaints  - MR Brain w/o Contrast; Future  - MR Cervical Spine w/o Contrast; Future  - OFFICE/OUTPT VISIT,SALVADOR DRAKE IV    7. Paresthesia of left leg  Atypical symptoms again as above suggest imaging studies for reassessment  - OFFICE/OUTPT VISIT,SALVADOR DRAKE IV    8. Rash  Is unclear to me why the patient did not follow-up in regards to recurrence of the symptoms nonetheless I suggested that she stop and make an appointment to see the dermatologist.  - OFFICE/OUTPT VISIT,EST,LEVL IV    COUNSELING:   Reviewed preventive health counseling, as reflected in patient instructions       Regular exercise       Healthy diet/nutrition    Estimated body mass index is 35.27 kg/m  as calculated from the following:    Height as of 4/11/19: 1.6 m (5' 3\").    Weight as of 4/11/19: 90.3 kg (199 lb 1.6 oz).         reports that she has never smoked. She has never used smokeless tobacco.      Counseling Resources:  ATP IV Guidelines  Pooled Cohorts Equation Calculator  Breast Cancer Risk Calculator  FRAX Risk Assessment  ICSI Preventive Guidelines  Dietary Guidelines for Americans, 2010  USDA's MyPlate  ASA Prophylaxis  Lung CA Screening    Nick Calderon MD  [facility]  "

## 2019-11-27 NOTE — LETTER
[facility]  [address] [address]  [phone]      11/27/2019       Jimmy Stevens  [address] [facility] [address]  [place] [address]        Dear Jimmy,    I am pleased to inform you that your routine blood work including your hemoglobin, sodium, potassium, calcium, kidney and liver function tests are all normal.    Your triglyceride level is slightly high and could be treated more aggressively with better diet, exercise and weight loss.  Please follow-up with me to discuss your further medication options/changes if you are interested.  If you would prefer more diet and exercise then I would recheck these labs in 6 months for comparison.    Your blood sugar function tests are slightly abnormal and elevated and should be rechecked here in the clinic in 6 months with a follow-up visit with me, fasting.  I will look forward to seeing you at that time and please call to make an appointment.  In the meantime, please work on your diet limiting your carbohydrates and getting some good, regular exercise.    Sincerely,      EFREM Gaviria  Internal Medicine

## 2019-12-02 ENCOUNTER — OFFICE VISIT (OUTPATIENT)
Dept: DERMATOLOGY | Facility: CLINIC | Age: 54
End: 2019-12-02
Payer: COMMERCIAL

## 2019-12-02 VITALS — OXYGEN SATURATION: 98 % | DIASTOLIC BLOOD PRESSURE: 86 MMHG | SYSTOLIC BLOOD PRESSURE: 139 MMHG | HEART RATE: 104 BPM

## 2019-12-02 DIAGNOSIS — B35.3 TINEA PEDIS OF LEFT FOOT: ICD-10-CM

## 2019-12-02 DIAGNOSIS — L30.9 DERMATITIS: Primary | ICD-10-CM

## 2019-12-02 DIAGNOSIS — L29.9 LOCALIZED PRURITUS: ICD-10-CM

## 2019-12-02 PROCEDURE — 99203 OFFICE O/P NEW LOW 30 MIN: CPT | Performed by: PHYSICIAN ASSISTANT

## 2019-12-02 RX ORDER — CLOBETASOL PROPIONATE 0.5 MG/G
CREAM TOPICAL 2 TIMES DAILY
Qty: 60 G | Refills: 0 | Status: SHIPPED | OUTPATIENT
Start: 2019-12-02 | End: 2021-07-13

## 2019-12-02 RX ORDER — KETOCONAZOLE 20 MG/G
CREAM TOPICAL 2 TIMES DAILY
Qty: 60 G | Refills: 1 | Status: SHIPPED | OUTPATIENT
Start: 2019-12-02 | End: 2021-07-13

## 2019-12-02 NOTE — PATIENT INSTRUCTIONS
Proper skin care from [facility]:    -Eliminate harsh soaps as they strip the natural oils from the skin, often resulting in dry itchy skin ( i.e. Dial, Zest, Sun Spring)  -Use mild soaps such as Cetaphil or Dove Sensitive Skin in the shower. You do not need to use soap on arms, legs, and trunk every time you shower unless visibly soiled.   -Avoid hot or cold showers.  -After showering, lightly dry off and apply moisturizing within 2-3 minutes. This will help trap moisture in the skin.   -Aggressive use of a moisturizer at least 1-2 times a day to the entire body (including -Vanicream, Cetaphil, Aquaphor or Cerave) and moisturize hands after every washing.  -We recommend using moisturizers that come in a tub that needs to be scooped out, not a pump. This has more of an oil base. It will hold moisture in your skin much better than a water base moisturizer. The above recommended are non-pore clogging.      Wear a sunscreen with at least SPF 30 on your face, ears, neck and V of the chest daily. Wear sunscreen on other areas of the body if those areas are exposed to the sun throughout the day. Sunscreens can contain physical and/or chemical blockers. Physical blockers are less likely to clog pores, these include zinc oxide and titanium dioxide. Reapply every two hour and after swimming. Sunscreen examples include Neutrogena, CeraVe, Blue Lizard, Elta MD and many others.    UV radiation  UVA radiation remains constant throughout the day and throughout the year. It is a longer wavelength than UVB and therefore penetrates deeper into the skin leading to immediate and delayed tanning, photoaging, and skin cancer. 70-80% of UVA and UVB radiation occurs between the hours of 10am-2pm.  UVB radiation  UVB radiation causes the most harmful effects and is more significant during the summer months. However, snow and ice can reflect UVB radiation leading to skin damage during the winter months as well. UVB radiation is  responsible for tanning, burning, inflammation, delayed erythema (pinkness), pigmentation (brown spots), and skin cancer.       Apply a thin layer of lidex to affected area 2x a day for 1-2 weeks. Tapering with improvement. Do not use on face or body folds.  Apply ketoconazole 2x a day to affected area on left foot x 4-6 weeks.     Discussed side effects of topical steroids including but not limited to atrophy (skin thinning), striae (stretch marks) telangiectasias, steroid acne, and others. Do not apply to normal skin. Do not apply to discolored skin that does not have rash present. Educated patient on post inflammatory hyperpigmentation.     Follow up in 3-4 weeks

## 2019-12-02 NOTE — LETTER
12/2/2019         RE: Jimmy Stevens  [address]  [place] [address]        Dear Colleague,    Thank you for referring your patient, Jimmy Stveens, to the [facility]. Please see a copy of my visit note below.    HPI:  I was asked to see pt by Dr. Calderon. Jimmy Stevens is a 54 year old female patient here today for rash on left foot .  Patient states this has been present for months.  Patient reports the following symptoms: itch and thickening of skin .  Patient reports the following previous treatments: oral abx, topical abx with no change Patient reports the following modifying factors: none.  Associated symptoms: none.  Patient has no other skin complaints today.  Remainder of the HPI, Meds, PMH, Allergies, FH, and SH was reviewed in chart.      Past Medical History:   Diagnosis Date     Acute stress reaction 9/9/2009     Essential hypertension, benign 3/15/2016     Hyperlipidemia LDL goal <160 10/31/2010     Iron deficiency anemia, unspecified      Knee pain 9/9/2009       Past Surgical History:   Procedure Laterality Date     C NONSPECIFIC PROCEDURE  10/29/99    MRI brain normal     C NONSPECIFIC PROCEDURE  02/00    B breast reduction surgery     C TOTAL ABDOM HYSTERECTOMY  3/99    Hysterectomy, Total Abdominal     HC REMOVAL OF OVARY/TUBE(S)  3/99    Salpingo-Oophorectomy, Unilateral     HYSTERECTOMY, PAP NO LONGER INDICATED  3/99        Family History   Problem Relation Age of Onset     Genitourinary Problems Mother         B:1942 Alive     Family History Negative Father         B:1940 Alive     Family History Negative Sister         5 sisters all healthy     Family History Negative Brother         1 brother healthy       Social History     Socioeconomic History     Marital status:      Spouse name: Not on file     Number of children: Not on file     Years of education: Not on file     Highest education level: Not on file   Occupational History      Occupation: Business Teller     Employer: [employer]   Social Needs     Financial resource strain: Not on file     Food insecurity:     Worry: Not on file     Inability: Not on file     Transportation needs:     Medical: Not on file     Non-medical: Not on file   Tobacco Use     Smoking status: Never Smoker     Smokeless tobacco: Never Used   Substance and Sexual Activity     Alcohol use: No     Drug use: No     Sexual activity: Yes     Partners: Male   Lifestyle     Physical activity:     Days per week: Not on file     Minutes per session: Not on file     Stress: Not on file   Relationships     Social connections:     Talks on phone: Not on file     Gets together: Not on file     Attends Worship service: Not on file     Active member of club or organization: Not on file     Attends meetings of clubs or organizations: Not on file     Relationship status: Not on file     Intimate partner violence:     Fear of current or ex partner: Not on file     Emotionally abused: Not on file     Physically abused: Not on file     Forced sexual activity: Not on file   Other Topics Concern      Service Not Asked     Blood Transfusions Not Asked     Caffeine Concern Not Asked     Occupational Exposure Not Asked     Hobby Hazards Not Asked     Sleep Concern Not Asked     Stress Concern Not Asked     Weight Concern Not Asked     Special Diet Not Asked     Back Care Not Asked     Exercise Yes     Bike Helmet Not Asked     Seat Belt Not Asked     Self-Exams Not Asked     Parent/sibling w/ CABG, MI or angioplasty before 65F 55M? Not Asked   Social History Narrative     Not on file       Outpatient Encounter Medications as of 12/2/2019   Medication Sig Dispense Refill     fluticasone (FLONASE) 50 MCG/ACT nasal spray Spray 1-2 sprays into both nostrils daily 1 Bottle 3     Oxymetazoline HCl (VICKS SINEX 12 HOUR NA)        No facility-administered encounter medications on file as of 12/2/2019.        Review Of Systems:  Skin: As  above  Eyes: negative  Ears/Nose/Throat: negative  Respiratory: No shortness of breath, dyspnea on exertion, cough, or hemoptysis  Cardiovascular: negative  Gastrointestinal: negative  Genitourinary: negative  Musculoskeletal: negative  Neurologic: negative  Psychiatric: negative  Hematologic/Lymphatic/Immunologic: negative  Endocrine: negative      Objective:     /86   Pulse 104   SpO2 98%   Breastfeeding No   Eyes: Conjunctivae/lids: Normal   ENT: Lips:  Normal  MSK: Normal  Cardiovascular: Peripheral edema none  Pulm: Breathing Normal  Neuro/Psych: Orientation: Normal; Mood/Affect: Normal, NAD, WDWN  Pt accompanied by: self  Following areas examined: face, neck, left foot  Jose skin type:iv   Findings:  Brown scaly excoriated plaque x 2 on left medial foot. Areas of hypo/hyperpigmentation  Assessment and Plan:  1) dermatitis +/-tinea pedis, localized pruritis  Disc possible etiologies.   Apply a thin layer of lidex to affected area 2x a day for 1-2 weeks. Tapering with improvement. Do not use on face or body folds.  Apply ketoconazole 2x a day to affected area on left foot x 4-6 weeks.     Discussed side effects of topical steroids including but not limited to atrophy (skin thinning), striae (stretch marks) telangiectasias, steroid acne, and others. Do not apply to normal skin. Do not apply to discolored skin that does not have rash present. Educated patient on post inflammatory hyperpigmentation.     Follow up in 3-4 weeks. Pt cannot follow up until January of 2020.       Again, thank you for allowing me to participate in the care of your patient.        Sincerely,        Jazzy Tomas PA-C

## 2019-12-02 NOTE — PROGRESS NOTES
HPI:  I was asked to see pt by Dr. Calderon. Jimmy Stevens is a 54 year old female patient here today for rash on left foot .  Patient states this has been present for months.  Patient reports the following symptoms: itch and thickening of skin .  Patient reports the following previous treatments: oral abx, topical abx with no change Patient reports the following modifying factors: none.  Associated symptoms: none.  Patient has no other skin complaints today.  Remainder of the HPI, Meds, PMH, Allergies, FH, and SH was reviewed in chart.      Past Medical History:   Diagnosis Date     Acute stress reaction 9/9/2009     Essential hypertension, benign 3/15/2016     Hyperlipidemia LDL goal <160 10/31/2010     Iron deficiency anemia, unspecified      Knee pain 9/9/2009       Past Surgical History:   Procedure Laterality Date     C NONSPECIFIC PROCEDURE  10/29/99    MRI brain normal     C NONSPECIFIC PROCEDURE  02/00    B breast reduction surgery     C TOTAL ABDOM HYSTERECTOMY  3/99    Hysterectomy, Total Abdominal     HC REMOVAL OF OVARY/TUBE(S)  3/99    Salpingo-Oophorectomy, Unilateral     HYSTERECTOMY, PAP NO LONGER INDICATED  3/99        Family History   Problem Relation Age of Onset     Genitourinary Problems Mother         B:1942 Alive     Family History Negative Father         B:1940 Alive     Family History Negative Sister         5 sisters all healthy     Family History Negative Brother         1 brother healthy       Social History     Socioeconomic History     Marital status:      Spouse name: Not on file     Number of children: Not on file     Years of education: Not on file     Highest education level: Not on file   Occupational History     Occupation: Business Teller     Employer: [employer]   Social Needs     Financial resource strain: Not on file     Food insecurity:     Worry: Not on file     Inability: Not on file     Transportation needs:     Medical: Not on file     Non-medical: Not on file    Tobacco Use     Smoking status: Never Smoker     Smokeless tobacco: Never Used   Substance and Sexual Activity     Alcohol use: No     Drug use: No     Sexual activity: Yes     Partners: Male   Lifestyle     Physical activity:     Days per week: Not on file     Minutes per session: Not on file     Stress: Not on file   Relationships     Social connections:     Talks on phone: Not on file     Gets together: Not on file     Attends Lutheran service: Not on file     Active member of club or organization: Not on file     Attends meetings of clubs or organizations: Not on file     Relationship status: Not on file     Intimate partner violence:     Fear of current or ex partner: Not on file     Emotionally abused: Not on file     Physically abused: Not on file     Forced sexual activity: Not on file   Other Topics Concern      Service Not Asked     Blood Transfusions Not Asked     Caffeine Concern Not Asked     Occupational Exposure Not Asked     Hobby Hazards Not Asked     Sleep Concern Not Asked     Stress Concern Not Asked     Weight Concern Not Asked     Special Diet Not Asked     Back Care Not Asked     Exercise Yes     Bike Helmet Not Asked     Seat Belt Not Asked     Self-Exams Not Asked     Parent/sibling w/ CABG, MI or angioplasty before 65F 55M? Not Asked   Social History Narrative     Not on file       Outpatient Encounter Medications as of 12/2/2019   Medication Sig Dispense Refill     fluticasone (FLONASE) 50 MCG/ACT nasal spray Spray 1-2 sprays into both nostrils daily 1 Bottle 3     Oxymetazoline HCl (VICKS SINEX 12 HOUR NA)        No facility-administered encounter medications on file as of 12/2/2019.        Review Of Systems:  Skin: As above  Eyes: negative  Ears/Nose/Throat: negative  Respiratory: No shortness of breath, dyspnea on exertion, cough, or hemoptysis  Cardiovascular: negative  Gastrointestinal: negative  Genitourinary: negative  Musculoskeletal: negative  Neurologic:  negative  Psychiatric: negative  Hematologic/Lymphatic/Immunologic: negative  Endocrine: negative      Objective:     /86   Pulse 104   SpO2 98%   Breastfeeding No   Eyes: Conjunctivae/lids: Normal   ENT: Lips:  Normal  MSK: Normal  Cardiovascular: Peripheral edema none  Pulm: Breathing Normal  Neuro/Psych: Orientation: Normal; Mood/Affect: Normal, NAD, WDWN  Pt accompanied by: self  Following areas examined: face, neck, left foot  Jose skin type:iv   Findings:  Brown scaly excoriated plaque x 2 on left medial foot. Areas of hypo/hyperpigmentation  Assessment and Plan:  1) dermatitis +/-tinea pedis, localized pruritis  Disc possible etiologies.   Apply a thin layer of lidex to affected area 2x a day for 1-2 weeks. Tapering with improvement. Do not use on face or body folds.  Apply ketoconazole 2x a day to affected area on left foot x 4-6 weeks.     Discussed side effects of topical steroids including but not limited to atrophy (skin thinning), striae (stretch marks) telangiectasias, steroid acne, and others. Do not apply to normal skin. Do not apply to discolored skin that does not have rash present. Educated patient on post inflammatory hyperpigmentation.     Follow up in 3-4 weeks. Pt cannot follow up until January of 2020.

## 2019-12-16 ENCOUNTER — HEALTH MAINTENANCE LETTER (OUTPATIENT)
Age: 54
End: 2019-12-16

## 2020-01-24 NOTE — MR AVS SNAPSHOT
After Visit Summary   11/29/2017    Jimmy Stevens    MRN: [id number]           Patient Information     Date Of Birth          1965        Visit Information        Provider Department      11/29/2017 7:00 AM Nick Calderon MD [facility]        Today's Diagnoses     Essential hypertension, benign    -  1    Left-sided thoracic back pain, unspecified chronicity        Chronic pain of right knee           Follow-ups after your visit        Additional Services     [facility] PT, HAND, AND CHIROPRACTIC REFERRAL       **This order will print in the [facility] Scheduling Office**    Physical Therapy, Hand Therapy and Chiropractic Care are available through:    *[facility]  *[facility]  *[facility]    Call one number to schedule at any of the above locations: [phone].    Your provider has referred you to: Physical Therapy at [facility] or [facility]    Indication/Reason for Referral: Low Back Pain  Onset of Illness: weeks  Therapy Orders: Evaluate and Treat  Special Programs: None  Special Request: None    Lelo Lind      Additional Comments for the Therapist or Chiropractor:     Please be aware that coverage of these services is subject to the terms and limitations of your health insurance plan.  Call member services at your health plan with any benefit or coverage questions.      Please bring the following to your appointment:    *Your personal calendar for scheduling future appointments  *Comfortable clothing            ORTHO  REFERRAL       [facility] is referring you to the Orthopedic  Services at [facility].       The  Representative will assist you in the coordination of your Orthopedic and Musculoskeletal Care as prescribed by your physician.    The  Representative will call you within 1 business day to help schedule your appointment, or you may contact  Managed per primary team  Avoid hypoglycemia       the [facility] Representative at:    All areas ~ [phone]     Type of Referral : Non Surgical       Timeframe requested: Within 2 weeks    Coverage of these services is subject to the terms and limitations of your health insurance plan.  Please call member services at your health plan with any benefit or coverage questions.      If X-rays, CT or MRI's have been performed, please contact the facility where they were done to arrange for , prior to your scheduled appointment.  Please bring this referral request to your appointment and present it to your specialist.                  Follow-up notes from your care team     Return in about 6 months (around 5/29/2018), or if symptoms worsen or fail to improve, for BP Recheck.      Future tests that were ordered for you today     Open Future Orders        Priority Expected Expires Ordered    XR Knee Right 1/2 Views Routine 11/29/2017 11/29/2018 11/29/2017            Who to contact     If you have questions or need follow up information about today's clinic visit or your schedule please contact [facility] directly at [phone].  Normal or non-critical lab and imaging results will be communicated to you by [email or web address], letter or phone within 4 business days after the clinic has received the results. If you do not hear from us within 7 days, please contact the clinic through [employer] or phone. If you have a critical or abnormal lab result, we will notify you by phone as soon as possible.  Submit refill requests through [facility] or call your pharmacy and they will forward the refill request to us. Please allow 3 business days for your refill to be completed.          Additional Information About Your Visit        [facility] Information     [facility] gives you secure access to your electronic health record. If you see a primary care provider, you can also send messages to your care team and make appointments. If you have questions, please call your  primary care clinic.  If you do not have a primary care provider, please call [phone] and they will assist you.        Care EveryWhere ID     This is your Care EveryWhere ID. This could be used by other organizations to access your [facility] medical records  [id number]        Your Vitals Were     Pulse Temperature Pulse Oximetry BMI (Body Mass Index)          105 98  F (36.7  C) (Oral) 98% 33.92 kg/m2         Blood Pressure from Last 3 Encounters:   11/29/17 122/80   06/27/17 120/82   10/17/16 (!) 140/102    Weight from Last 3 Encounters:   11/29/17 191 lb 8 oz (86.9 kg)   06/27/17 191 lb 4.8 oz (86.8 kg)   10/13/16 190 lb (86.2 kg)              We Performed the Following     Basic metabolic panel     [facility] PT, HAND, AND CHIROPRACTIC REFERRAL     ORTHO  REFERRAL        Primary Care Provider Office Phone # Fax #    Nick Calderon [phone]       [address] [place] [id number]        Equal Access to Services     [facility] : Hadii aad ku hadasho Soomaali, waaxda luqadaha, qaybta kaalmada ademonieyada, debbie adam . So [facility] [phone].    ATENCIÓN: Si habla español, tiene a de la torre disposición servicios gratuitos de asistencia lingüística. Jayesh al [phone].    We comply with applicable federal civil rights laws and [place] laws. We do not discriminate on the basis of race, color, national origin, age, disability, sex, sexual orientation, or gender identity.            Thank you!     Thank you for choosing [facility]  for your care. Our goal is always to provide you with excellent care. Hearing back from our patients is one way we can continue to improve our services. Please take a few minutes to complete the written survey that you may receive in the mail after your visit with us. Thank you!             Your Updated Medication List - Protect others around you: Learn how to safely use, store and throw away your medicines  at www.disposemymeds.org.          This list is accurate as of: 11/29/17  7:29 AM.  Always use your most recent med list.                   Brand Name Dispense Instructions for use Diagnosis    ADVIL 200 MG capsule   Generic drug:  ibuprofen      Take 200 mg by mouth as needed for fever        fluticasone 50 MCG/ACT spray    FLONASE    1 Bottle    Spray 1-2 sprays into both nostrils daily    Sinus disorder

## 2021-01-15 ENCOUNTER — HEALTH MAINTENANCE LETTER (OUTPATIENT)
Age: 56
End: 2021-01-15

## 2021-04-08 ENCOUNTER — ANCILLARY PROCEDURE (OUTPATIENT)
Dept: MAMMOGRAPHY | Facility: CLINIC | Age: 56
End: 2021-04-08
Attending: INTERNAL MEDICINE
Payer: COMMERCIAL

## 2021-04-08 ENCOUNTER — TELEPHONE (OUTPATIENT)
Dept: INTERNAL MEDICINE | Facility: CLINIC | Age: 56
End: 2021-04-08

## 2021-04-08 ENCOUNTER — OFFICE VISIT (OUTPATIENT)
Dept: INTERNAL MEDICINE | Facility: CLINIC | Age: 56
End: 2021-04-08
Payer: COMMERCIAL

## 2021-04-08 VITALS
SYSTOLIC BLOOD PRESSURE: 148 MMHG | BODY MASS INDEX: 37.03 KG/M2 | TEMPERATURE: 96.4 F | HEART RATE: 104 BPM | RESPIRATION RATE: 15 BRPM | HEIGHT: 63 IN | OXYGEN SATURATION: 98 % | WEIGHT: 209 LBS | DIASTOLIC BLOOD PRESSURE: 90 MMHG

## 2021-04-08 DIAGNOSIS — E78.5 HYPERLIPIDEMIA LDL GOAL <160: ICD-10-CM

## 2021-04-08 DIAGNOSIS — I10 ESSENTIAL HYPERTENSION, BENIGN: ICD-10-CM

## 2021-04-08 DIAGNOSIS — M54.50 CHRONIC BILATERAL LOW BACK PAIN WITHOUT SCIATICA: ICD-10-CM

## 2021-04-08 DIAGNOSIS — E66.01 MORBID OBESITY (H): ICD-10-CM

## 2021-04-08 DIAGNOSIS — Z12.31 VISIT FOR SCREENING MAMMOGRAM: ICD-10-CM

## 2021-04-08 DIAGNOSIS — Z12.11 COLON CANCER SCREENING: ICD-10-CM

## 2021-04-08 DIAGNOSIS — Z00.00 ROUTINE GENERAL MEDICAL EXAMINATION AT A HEALTH CARE FACILITY: Primary | ICD-10-CM

## 2021-04-08 DIAGNOSIS — G89.29 CHRONIC BILATERAL LOW BACK PAIN WITHOUT SCIATICA: ICD-10-CM

## 2021-04-08 LAB
ALBUMIN SERPL-MCNC: 3.6 G/DL (ref 3.4–5)
ALP SERPL-CCNC: 76 U/L (ref 40–150)
ALT SERPL W P-5'-P-CCNC: 18 U/L (ref 0–50)
ANION GAP SERPL CALCULATED.3IONS-SCNC: <1 MMOL/L (ref 3–14)
AST SERPL W P-5'-P-CCNC: 11 U/L (ref 0–45)
BILIRUB SERPL-MCNC: 0.4 MG/DL (ref 0.2–1.3)
BUN SERPL-MCNC: 12 MG/DL (ref 7–30)
CALCIUM SERPL-MCNC: 9.5 MG/DL (ref 8.5–10.1)
CHLORIDE SERPL-SCNC: 104 MMOL/L (ref 94–109)
CHOLEST SERPL-MCNC: 184 MG/DL
CO2 SERPL-SCNC: 33 MMOL/L (ref 20–32)
CREAT SERPL-MCNC: 0.73 MG/DL (ref 0.52–1.04)
GFR SERPL CREATININE-BSD FRML MDRD: >90 ML/MIN/{1.73_M2}
GLUCOSE SERPL-MCNC: 108 MG/DL (ref 70–99)
HDLC SERPL-MCNC: 46 MG/DL
HGB BLD-MCNC: 13.4 G/DL (ref 11.7–15.7)
LDLC SERPL CALC-MCNC: 96 MG/DL
NONHDLC SERPL-MCNC: 138 MG/DL
POTASSIUM SERPL-SCNC: 4.1 MMOL/L (ref 3.4–5.3)
PROT SERPL-MCNC: 7.7 G/DL (ref 6.8–8.8)
SODIUM SERPL-SCNC: 137 MMOL/L (ref 133–144)
TRIGL SERPL-MCNC: 209 MG/DL

## 2021-04-08 PROCEDURE — 99396 PREV VISIT EST AGE 40-64: CPT | Performed by: INTERNAL MEDICINE

## 2021-04-08 PROCEDURE — 80061 LIPID PANEL: CPT | Performed by: INTERNAL MEDICINE

## 2021-04-08 PROCEDURE — 99214 OFFICE O/P EST MOD 30 MIN: CPT | Mod: 25 | Performed by: INTERNAL MEDICINE

## 2021-04-08 PROCEDURE — 77067 SCR MAMMO BI INCL CAD: CPT | Mod: TC | Performed by: RADIOLOGY

## 2021-04-08 PROCEDURE — 85018 HEMOGLOBIN: CPT | Performed by: INTERNAL MEDICINE

## 2021-04-08 PROCEDURE — 80053 COMPREHEN METABOLIC PANEL: CPT | Performed by: INTERNAL MEDICINE

## 2021-04-08 PROCEDURE — 36415 COLL VENOUS BLD VENIPUNCTURE: CPT | Performed by: INTERNAL MEDICINE

## 2021-04-08 RX ORDER — LISINOPRIL 20 MG/1
20 TABLET ORAL DAILY
Qty: 90 TABLET | Refills: 1 | Status: SHIPPED | OUTPATIENT
Start: 2021-04-08 | End: 2021-07-21

## 2021-04-08 ASSESSMENT — MIFFLIN-ST. JEOR: SCORE: 1512.15

## 2021-04-08 NOTE — PATIENT INSTRUCTIONS
Covid-19 vaccine scheduling line: [phone]    While the state has opened eligibility to all people, our health system will continue to prioritize those groups who are most at risk of exposure to COVID-19 and/or hospitalization due to COVID-19. Once a larger percentage of these groups are vaccinated, we will open vaccine appointments to a larger group.      We are working hard to begin vaccinating more people against COVID-19. Beginning Wednesday, March 31, we are vaccinating:     Individuals age 50 and older.    All healthcare workers, both paid and unpaid.     People age 16 or 18-49 years with certain medical conditions or disabilities listed in Phase 1b tier 4.    People who identify as one or more of the following high-risk groups: Black/, /[place] Native, Southeast , /, and /.     If you fit into one of these categories, please log in to [employer] using this link to see if we have an open appointment and schedule an appointment.       As vaccine supply increases and we are able to open appointments to more groups, we will share that information on our website:  [email or web address]. Check this website to stay up to date on COVID-19 vaccination information.    Preventive Health Recommendations  Female Ages 50 - 64    Yearly exam: See your health care provider every year in order to  o Review health changes.   o Discuss preventive care.    o Review your medicines if your doctor has prescribed any.      Get a Pap test every three years (unless you have an abnormal result and your provider advises testing more often).    If you get Pap tests with HPV test, you only need to test every 5 years, unless you have an abnormal result.     You do not need a Pap test if your uterus was removed (hysterectomy) and you have not had cancer.    You should be tested each year for STDs (sexually transmitted diseases) if you're  at risk.     Have a mammogram every 1 to 2 years.    Have a colonoscopy at age 50, or have a yearly FIT test (stool test). These exams screen for colon cancer.      Have a cholesterol test every 5 years, or more often if advised.    Have a diabetes test (fasting glucose) every three years. If you are at risk for diabetes, you should have this test more often.     If you are at risk for osteoporosis (brittle bone disease), think about having a bone density scan (DEXA).    Shots: Get a flu shot each year. Get a tetanus shot every 10 years.    Nutrition:     Eat at least 5 servings of fruits and vegetables each day.    Eat whole-grain bread, whole-wheat pasta and brown rice instead of white grains and rice.    Get adequate Calcium and Vitamin D.     Lifestyle    Exercise at least 150 minutes a week (30 minutes a day, 5 days a week). This will help you control your weight and prevent disease.    Limit alcohol to one drink per day.    No smoking.     Wear sunscreen to prevent skin cancer.     See your dentist every six months for an exam and cleaning.    See your eye doctor every 1 to 2 years.

## 2021-04-08 NOTE — PROGRESS NOTES
SUBJECTIVE:   CC: Jimmy Sherman Rodney is an 55 year old woman who presents for preventive health visit.   Patient has been advised of split billing requirements and indicates understanding: Yes     Answers for HPI/ROS submitted by the patient on 4/8/2021   Annual Exam:  Frequency of exercise:: 2-3 days/week  Getting at least 3 servings of Calcium per day:: Yes  Diet:: Low salt, Low fat/cholesterol, Carbohydrate counting  Taking medications regularly:: Not Applicable  Bi-annual eye exam:: Yes  Dental care twice a year:: NO  Sleep apnea or symptoms of sleep apnea:: None  Additional concerns today:: No  Duration of exercise:: 15-30 minutes    Other concerns:  1. Patient states she has been checking home BP readings and pulse. Both have been elevated (cannot recall numbers)   2. Burning sensation from left side midback and down leg, intermittently occurs while sitting     She once again has been under some psychosocial stresses within her family life.    Today's PHQ-2 Score:   PHQ-2 ( 1999 Pfizer) 11/27/2019 10/25/2018   Q1: Little interest or pleasure in doing things 0 0   Q2: Feeling down, depressed or hopeless 0 0   PHQ-2 Score 0 0   Q1: Little interest or pleasure in doing things Not at all -   Q2: Feeling down, depressed or hopeless Not at all -   PHQ-2 Score 0 -       Abuse: Current or Past(Physical, Sexual or Emotional)- NOT APPLICABLE  Do you feel safe in your environment? Yes    Have you ever done Advance Care Planning? (For example, a Health Directive, POLST, or a discussion with a medical provider or your loved ones about your wishes): No, advance care planning information given to patient to review.  Patient declined advance care planning discussion at this time.    Social History     Tobacco Use     Smoking status: Never Smoker     Smokeless tobacco: Never Used   Substance Use Topics     Alcohol use: No     If you drink alcohol do you typically have >3 drinks per day or >7 drinks per week? No         "             Reviewed orders with patient.  Reviewed health maintenance and updated orders accordingly - Yes    Mammogram Screening: Recommended mammography every 1-2 years with patient discussion and risk factor consideration and recommend annual screening  Pertinent mammograms are reviewed under the imaging tab.    Pertinent mammograms are reviewed under the imaging tab.  History of abnormal Pap smear: NO - age 30-65 PAP every 5 years with negative HPV co-testing recommended  PAP / HPV Latest Ref Rng & Units 3/10/2016 11/19/2007 5/18/2006   PAP - NIL NIL NIL   HPV 16 DNA NEG Negative - -   HPV 18 DNA NEG Negative - -   OTHER HR HPV NEG Negative - -     Reviewed and updated as needed this visit by clinical staff               Reviewed and updated as needed this visit by Provider                 ROS:  CONSTITUTIONAL: NEGATIVE for fever, chills, change in weight  EYES: NEGATIVE for vision changes or irritation  ENT: NEGATIVE for ear, mouth and throat problems  RESP: NEGATIVE for significant cough or SOB  CV: NEGATIVE for chest pain, palpitations or peripheral edema  GI: NEGATIVE for nausea, abdominal pain, heartburn, or change in bowel habits  : NEGATIVE for unusual urinary or vaginal symptoms. No vaginal bleeding.  MUSCULOSKELETAL: NEGATIVE for significant arthralgias or myalgia  NEURO: NEGATIVE for weakness, dizziness or paresthesias  PSYCHIATRIC: NEGATIVE for changes in mood or affect     OBJECTIVE:   BP (!) 148/90   Pulse 104   Temp 96.4  F (35.8  C) (Temporal)   Resp 15   Ht 1.6 m (5' 3\")   Wt 94.8 kg (209 lb)   SpO2 98%   BMI 37.02 kg/m    EXAM:  GENERAL: alert and no distress  EYES: Eyes grossly normal to inspection, PERRL and conjunctivae and sclerae normal  HENT: ear canals and TM's normal, nose and mouth without ulcers or lesions  NECK: no adenopathy, no asymmetry, masses, or scars and thyroid normal to palpation  RESP: lungs clear to auscultation - no rales, rhonchi or wheezes  BREAST: normal " without masses, tenderness or nipple discharge and no palpable axillary masses or adenopathy  Pap deferred per patient  CV: regular rate and rhythm, normal S1 S2, no S3 or S4, no click or rub, no peripheral edema and peripheral pulses strong  ABDOMEN: mildly obese, soft, nontender, no hepatosplenomegaly, no masses and bowel sounds normal  Darker patch skin entire medial aspect left foot. Will see Dermatology  MS: no gross musculoskeletal defects noted  NEURO: No focal changes  PSYCH: mentation appears normal, affect anxious    ASSESSMENT/PLAN:   1. Routine general medical examination at a health care facility  Advised patient that she should be seen for a Pap smear but it is unclear if she is on a 3-year 5-year schedule.  We have forwarded her Pap smear information to be reviewed for need.  - Hemoglobin  - Comprehensive metabolic panel  - Lipid Profile  - Reviewed results of mammogram with patient    2. Essential hypertension, benign  - OFFICE/OUTPT VISIT,EST,LEVL III  - lisinopril (ZESTRIL) 20 MG tablet; Take 1 tablet (20 mg) by mouth daily  Dispense: 90 tablet; Refill: 1  Recheck blood pressure in 1 month  - Comprehensive metabolic panel    3. Hyperlipidemia LDL goal <160  Labs ordered as fasting.  - Lipid Profile    4. Obesity (BMI 35.0-39.9) with comorbidity (H)  Reviewed patient's weight graph and encouraged ongoing weight reduction    5. Colon cancer screening  Offered colonoscopy but again refused.  - Fecal colorectal cancer screen (FIT); Future    6. Chronic bilateral low back pain without sciatica  We will start trial of physical therapy with no focal changes noted on exam to indicate obvious weakness or radicular change.  - [facility] PT AND HAND REFERRAL; Future  - OFFICE/OUTPT VISIT,EST,LEVL III      Patient has been advised of split billing requirements and indicates understanding: Yes  COUNSELING:   Reviewed preventive health counseling, as reflected in patient instructions       Regular exercise        "Healthy diet/nutrition     Advised patient also to follow-up in dermatology clinic.    Estimated body mass index is 36.1 kg/m  as calculated from the following:    Height as of 4/11/19: 1.6 m (5' 3\").    Weight as of 11/27/19: 92.4 kg (203 lb 12.8 oz).      She reports that she has never smoked. She has never used smokeless tobacco.      Counseling Resources:  ATP IV Guidelines  Pooled Cohorts Equation Calculator  Breast Cancer Risk Calculator  BRCA-Related Cancer Risk Assessment: FHS-7 Tool  FRAX Risk Assessment  ICSI Preventive Guidelines  Dietary Guidelines for Americans, 2010  USDA's MyPlate  ASA Prophylaxis  Lung CA Screening    Nick Calderon MD  [facility]  "

## 2021-04-08 NOTE — TELEPHONE ENCOUNTER
Provider requesting review of pap hx and update of health maintenance. Please advise if cervical cancer screening can be changed to 5 yrs.

## 2021-04-12 NOTE — TELEPHONE ENCOUNTER
"Hello!   It appears pt has had a total hysterectomy 3/1999.    I don't see that we have her records.  Her pap smears in 2016 & 2007 were labeled as \"Cervical\" (perhaps labeled in error?), whereas her 2006 pap smear was labeled \"Vaginal\".     Visit notes in 2016 record \"absent cervix\".    Per guidelines, if pt is S/P hysterectomy with removal of cervix AND has no history of AMPARO 2+ in the last 25 years, she is ok to discontinue pap smears.    I do not see pap hx prior to hysterectomy or reason for hysterectomy.    May want to consider having clinic care team reach out to obtain records to determine if continued pap smears are needed.    Let me know if I can be of further help if/when records are received.      Thanks!  Kiesha Harris, CARMENCITAN, RN    "

## 2021-04-13 NOTE — TELEPHONE ENCOUNTER
Left message for patient to call back. Please gather additional information on if patient had a total or partial hysterectomy

## 2021-04-20 DIAGNOSIS — Z12.11 COLON CANCER SCREENING: ICD-10-CM

## 2021-04-20 PROCEDURE — 82274 ASSAY TEST FOR BLOOD FECAL: CPT | Performed by: INTERNAL MEDICINE

## 2021-04-25 LAB — HEMOCCULT STL QL IA: NEGATIVE

## 2021-07-21 ENCOUNTER — OFFICE VISIT (OUTPATIENT)
Dept: INTERNAL MEDICINE | Facility: CLINIC | Age: 56
End: 2021-07-21
Payer: COMMERCIAL

## 2021-07-21 VITALS
TEMPERATURE: 97.6 F | WEIGHT: 208 LBS | SYSTOLIC BLOOD PRESSURE: 128 MMHG | HEART RATE: 104 BPM | DIASTOLIC BLOOD PRESSURE: 88 MMHG | BODY MASS INDEX: 36.85 KG/M2 | RESPIRATION RATE: 16 BRPM | OXYGEN SATURATION: 98 %

## 2021-07-21 DIAGNOSIS — I10 ESSENTIAL HYPERTENSION, BENIGN: Primary | ICD-10-CM

## 2021-07-21 PROCEDURE — 99214 OFFICE O/P EST MOD 30 MIN: CPT | Performed by: INTERNAL MEDICINE

## 2021-07-21 RX ORDER — LISINOPRIL 20 MG/1
20 TABLET ORAL 2 TIMES DAILY
Qty: 180 TABLET | Refills: 1 | Status: SHIPPED | OUTPATIENT
Start: 2021-07-21 | End: 2022-03-02

## 2021-07-21 NOTE — PROGRESS NOTES
Assessment & Plan     Essential hypertension, benign  As increasing lisinopril to twice daily dosing and recheck blood pressure 3 months.  - lisinopril (ZESTRIL) 20 MG tablet; Take 1 tablet (20 mg) by mouth 2 times daily       See Patient Instructions  Patient was again advised to obtain a colonoscopy which she refuses to do and advised to update her Pap smear.  Is also been advised to obtain a shingles vaccination.    Return in about 3 months (around 10/21/2021) for BP Recheck.    Nick Calderon MD  [facility]    Jordyn Marquez is a 55 year old who presents for the following health issues  accompanied by her self:    HPI     Patient is here today for follow-up.  She states again that she is under considerable amount of stress primarily related to not only her job but also her daughter who apparently recently has some surgery.  She also has been dealing with a significant divorce and eventual death of her ex-.    Hypertension Follow-up:      Do you check your blood pressure regularly outside of the clinic? No     Are you following a low salt diet? Yes    Are your blood pressures ever more than 140 on the top number (systolic) OR more   than 90 on the bottom number (diastolic), for example 140/90? Yes      How many servings of fruits and vegetables do you eat daily?  2-3    On average, how many sweetened beverages do you drink each day (Examples: soda, juice, sweet tea, etc.  Do NOT count diet or artificially sweetened beverages)?   0    How many days per week do you exercise enough to make your heart beat faster? 3 or less    How many minutes a day do you exercise enough to make your heart beat faster? 10 - 19    How many days per week do you miss taking your medication? 0    Review of Systems   CONSTITUTIONAL: NEGATIVE for fever, chills, mild change in weight  EYES: NEGATIVE for vision changes or irritation  ENT/MOUTH: NEGATIVE for ear, mouth and throat  problems  RESP: NEGATIVE for significant cough or SOB  CV: NEGATIVE for chest pain, palpitations or peripheral edema  GI: NEGATIVE for nausea, abdominal pain, heartburn, or change in bowel habits  : NEGATIVE for frequency, dysuria, or hematuria  NEURO: NEGATIVE for weakness, dizziness or paresthesias  HEME: NEGATIVE for bleeding problems  PSYCHIATRIC: + for changes in mood or affect      Objective    /88 (BP Location: Left arm, Patient Position: Chair, Cuff Size: Adult Large)   Pulse 104   Temp 97.6  F (36.4  C) (Temporal)   Resp 16   Wt 94.3 kg (208 lb)   SpO2 98%   BMI 36.85 kg/m    Body mass index is 36.85 kg/m .     Physical Exam     GENERAL: alert and no distress  RESP: lungs clear to auscultation - no rales, rhonchi or wheezes  CV: regular rate and rhythm, normal S1 S2, no S3 or S4, no click or rub  MS: no gross musculoskeletal defects noted  PSYCH: mentation appears normal, affect anxious    Creatinine   Date Value Ref Range Status   04/08/2021 0.73 0.52 - 1.04 mg/dL Final

## 2021-09-05 ENCOUNTER — HEALTH MAINTENANCE LETTER (OUTPATIENT)
Age: 56
End: 2021-09-05

## 2022-02-07 ENCOUNTER — NURSE TRIAGE (OUTPATIENT)
Dept: INTERNAL MEDICINE | Facility: CLINIC | Age: 57
End: 2022-02-07
Payer: COMMERCIAL

## 2022-02-07 ENCOUNTER — HOSPITAL ENCOUNTER (EMERGENCY)
Facility: CLINIC | Age: 57
Discharge: HOME | End: 2022-02-07
Attending: EMERGENCY MEDICINE | Admitting: EMERGENCY MEDICINE
Payer: COMMERCIAL

## 2022-02-07 VITALS
RESPIRATION RATE: 20 BRPM | TEMPERATURE: 97.8 F | HEART RATE: 100 BPM | WEIGHT: 212.74 LBS | BODY MASS INDEX: 37.69 KG/M2 | SYSTOLIC BLOOD PRESSURE: 147 MMHG | DIASTOLIC BLOOD PRESSURE: 100 MMHG | OXYGEN SATURATION: 100 %

## 2022-02-07 DIAGNOSIS — G56.03 BILATERAL CARPAL TUNNEL SYNDROME: ICD-10-CM

## 2022-02-07 PROCEDURE — 99283 EMERGENCY DEPT VISIT LOW MDM: CPT

## 2022-02-07 RX ORDER — GABAPENTIN 300 MG/1
300 CAPSULE ORAL AT BEDTIME
Qty: 30 CAPSULE | Refills: 0 | Status: SHIPPED | OUTPATIENT
Start: 2022-02-07 | End: 2022-02-13

## 2022-02-07 ASSESSMENT — ENCOUNTER SYMPTOMS
NUMBNESS: 1
WEAKNESS: 1

## 2022-02-07 NOTE — TELEPHONE ENCOUNTER
"Patient called \"I have symptoms\"  \"its like heavy/stiff and numb\"  Fingers since last week are numb/going stiff.- tips of fingers, both hands.   Right foot toes coming and going.- tingling/burning right side with laying down  Left side back burning- constant  Onset: last week  Denies shortess of breath, chest pain, dizziness, vision loss, double vision, headache, weakness (one sided or bilateral).     No OV available at [facility] writer advised patient to be seen as soon as possible in urgent care. Patient states \"I will go in go into urgent care then\".    Triaged per Epic Triage Protocol, gave care advice which patient plans to follow.  See Care advice tab for more information.  Patent to call back if further questions or concerns.    Jordy Suggs RN  [facility]    Reason for Disposition    Neurologic deficit of gradual onset, ANY of the following: * Weakness of the face, arm, or leg on one side of the body * Numbness of the face, arm, or leg on one side of the body * Loss of speech or garbled speech    Additional Information    Negative: Difficult to awaken or acting confused (e.g., disoriented, slurred speech)    Negative: New neurologic deficit that is present NOW, sudden onset of ANY of the following: * Weakness of the face, arm, or leg on one side of the body* Numbness of the face, arm, or leg on one side of the body* Loss of speech or garbled speech    Negative: Sounds like a life-threatening emergency to the triager    Negative: Confusion, disorientation, or hallucinations is the main symptom    Negative: Dizziness is the main symptom    Negative: Followed a head injury within last 3 days    Negative: Headache (with neurologic deficit)    Negative: Unable to urinate (or only a few drops) and bladder feels very full    Negative: Loss of control of bowel or bladder (i.e., incontinence) of new onset    Negative: Back pain with numbness (loss of sensation) in groin or rectal area    " Negative: Patient sounds very sick or weak to the triager    Negative: Neurologic deficit that was brief (now gone), ANY of the following: * Weakness of the face, arm, or leg on one side of the body * Numbness of the face, arm, or leg on one side of the body * Loss of speech or garbled speech    Protocols used: NEUROLOGIC DEFICIT-A-OH

## 2022-02-08 NOTE — ED TRIAGE NOTES
A&O x4, ABCs intact. Pt presents with concern for numbness in fingers bilaterally. Pt states that 10 days ago she felt cold at work. That night ahs started having burning and numbness in her fingertips which has been progressively getting worse. She's having a hard time grabbing onto things. Pt also reports let back burning and right toes are now burning.

## 2022-02-08 NOTE — ED PROVIDER NOTES
History   Chief Complaint:  Extremity Weakness       HPI   Jimmy Stevens is a 56 year old female who presents with numbness and weakness in her fingertips. She first started to feel a cold sensation in her fingers 10 days ago. Then she developed numbness in her fingers along with a burning sensation up her LUE. This burning sensation keeps her up at night. She was doing dishes one night and a plate had slipped through her fingers.  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      Review of Systems   Neurological: Positive for weakness and numbness.   All other systems reviewed and are negative.      Allergies:  H2 Antagonists  Minocycline    Medications:  Zestril    Past Medical History:     Acute stress reaction  Hypertension  Hyperlipidemia  Anemia  Cervicalgia    Past Surgical History:    Removal of ovaries  Hysterectomy  MRI brain    Family History:    Genitourinary Problems    Social History:  The patient presents alone    Physical Exam     Patient Vitals for the past 24 hrs:   BP Temp Temp src Pulse Resp SpO2 Weight   02/07/22 1854 (!) 164/105 97.8  F (36.6  C) Temporal 118 20 100 % 96.5 kg (212 lb 11.9 oz)       Physical Exam  General/Appearance: appears stated age, well-groomed, appears comfortable  Eyes: EOMI, no scleral injection, no icterus  ENT: MMM  Neck: supple, nl ROM, no stiffness  Cardiovascular: RRR, nl S1S2, no m/r/g, 2+ pulses in all 4 extremities, cap refill <2sec  Respiratory: CTAB, good air movement throughout, no wheezes/rhonchi/rales, no increased WOB, no retractions  MSK: MOORE, good tone, no bony abnormality, hyperflexion of BL wrists recreated exaggerated burning pain  Skin: warm and well-perfused, no rash, no edema, no ecchymosis, nl turgor  Neuro: GCS 15, alert and oriented, no gross focal neuro deficits  Psych: interacts appropriately  Heme: no petechia, no purpura, no active bleeding          Emergency Department Course     Emergency Department  Course:    Reviewed:  I reviewed nursing notes, vitals, past medical history and Care Everywhere    Assessments:  1930 I obtained history and examined the patient as noted above.     1938 I rechecked the patient and explained findings.     Disposition:  The patient was discharged to home.     Impression & Plan     Medical Decision Making:  This patient is a pleasant 56-year-old female who presents with numbness, cold feeling, bilateral weakness to her bilateral hands.  This is exaggerated at the bedside when I have her hyperflexed both wrists for 30 seconds.  She has a job which requires repetitive movements at the wrists.  I suspect this is all carpal tunnel in nature.  She is got no neck pain or anything to suggest nerve impingement at the roots.  There is no erythema, warmth, tenderness to make me concerned for infection.  The sounds neuro muscular in nature.  We will place her in Velcro wrist splints to try to help stabilize the wrists and decrease the repetitive motions of flexion and extension.  I will write for some gabapentin to be used at night.  I have asked her to follow-up with neurology in the clinic.  Otherwise at this time she feels comfortable with discharge and I think it is reasonable for supportive care to be done as an outpatient.      Diagnosis:    ICD-10-CM    1. Bilateral carpal tunnel syndrome  G56.03        Discharge Medications:  New Prescriptions    GABAPENTIN (NEURONTIN) 300 MG CAPSULE    Take 1 capsule (300 mg) by mouth At Bedtime       Scribe Disclosure:  I, Jordyn Ken, am serving as a scribe at 7:17 PM on 2/7/2022 to document services personally performed by Katie George MD based on my observations and the provider's statements to me.            Katie George MD  02/07/22 1947

## 2022-02-13 ENCOUNTER — APPOINTMENT (OUTPATIENT)
Dept: MRI IMAGING | Facility: CLINIC | Age: 57
End: 2022-02-13
Attending: EMERGENCY MEDICINE
Payer: COMMERCIAL

## 2022-02-13 ENCOUNTER — APPOINTMENT (OUTPATIENT)
Dept: GENERAL RADIOLOGY | Facility: CLINIC | Age: 57
End: 2022-02-13
Attending: EMERGENCY MEDICINE
Payer: COMMERCIAL

## 2022-02-13 ENCOUNTER — HOSPITAL ENCOUNTER (EMERGENCY)
Facility: CLINIC | Age: 57
Discharge: HOME | End: 2022-02-13
Attending: EMERGENCY MEDICINE | Admitting: EMERGENCY MEDICINE
Payer: COMMERCIAL

## 2022-02-13 DIAGNOSIS — G62.9 PERIPHERAL POLYNEUROPATHY: ICD-10-CM

## 2022-02-13 LAB
ALBUMIN SERPL-MCNC: 3.2 G/DL (ref 3.4–5)
ALP SERPL-CCNC: 70 U/L (ref 40–150)
ALT SERPL W P-5'-P-CCNC: 17 U/L (ref 0–50)
ANION GAP SERPL CALCULATED.3IONS-SCNC: 3 MMOL/L (ref 3–14)
AST SERPL W P-5'-P-CCNC: 7 U/L (ref 0–45)
BASOPHILS # BLD AUTO: 0 10E3/UL (ref 0–0.2)
BASOPHILS NFR BLD AUTO: 0 %
BILIRUB SERPL-MCNC: 0.4 MG/DL (ref 0.2–1.3)
BUN SERPL-MCNC: 13 MG/DL (ref 7–30)
CALCIUM SERPL-MCNC: 8.9 MG/DL (ref 8.5–10.1)
CHLORIDE BLD-SCNC: 106 MMOL/L (ref 94–109)
CO2 SERPL-SCNC: 30 MMOL/L (ref 20–32)
CREAT SERPL-MCNC: 0.76 MG/DL (ref 0.52–1.04)
D DIMER PPP FEU-MCNC: 0.37 UG/ML FEU (ref 0–0.5)
EOSINOPHIL # BLD AUTO: 0.1 10E3/UL (ref 0–0.7)
EOSINOPHIL NFR BLD AUTO: 1 %
ERYTHROCYTE [DISTWIDTH] IN BLOOD BY AUTOMATED COUNT: 13.4 % (ref 10–15)
GFR SERPL CREATININE-BSD FRML MDRD: >90 ML/MIN/1.73M2
GLUCOSE BLD-MCNC: 121 MG/DL (ref 70–99)
HCT VFR BLD AUTO: 43.2 % (ref 35–47)
HGB BLD-MCNC: 14 G/DL (ref 11.7–15.7)
HOLD SPECIMEN: NORMAL
IMM GRANULOCYTES # BLD: 0 10E3/UL
IMM GRANULOCYTES NFR BLD: 0 %
LYMPHOCYTES # BLD AUTO: 2.7 10E3/UL (ref 0.8–5.3)
LYMPHOCYTES NFR BLD AUTO: 31 %
MCH RBC QN AUTO: 27.3 PG (ref 26.5–33)
MCHC RBC AUTO-ENTMCNC: 32.4 G/DL (ref 31.5–36.5)
MCV RBC AUTO: 84 FL (ref 78–100)
MONOCYTES # BLD AUTO: 0.5 10E3/UL (ref 0–1.3)
MONOCYTES NFR BLD AUTO: 6 %
NEUTROPHILS # BLD AUTO: 5.4 10E3/UL (ref 1.6–8.3)
NEUTROPHILS NFR BLD AUTO: 62 %
NRBC # BLD AUTO: 0 10E3/UL
NRBC BLD AUTO-RTO: 0 /100
PLATELET # BLD AUTO: 330 10E3/UL (ref 150–450)
POTASSIUM BLD-SCNC: 3.4 MMOL/L (ref 3.4–5.3)
PROT SERPL-MCNC: 7.5 G/DL (ref 6.8–8.8)
RBC # BLD AUTO: 5.12 10E6/UL (ref 3.8–5.2)
SODIUM SERPL-SCNC: 139 MMOL/L (ref 133–144)
WBC # BLD AUTO: 8.8 10E3/UL (ref 4–11)

## 2022-02-13 PROCEDURE — 36415 COLL VENOUS BLD VENIPUNCTURE: CPT | Performed by: EMERGENCY MEDICINE

## 2022-02-13 PROCEDURE — 255N000002 HC RX 255 OP 636: Performed by: EMERGENCY MEDICINE

## 2022-02-13 PROCEDURE — 99285 EMERGENCY DEPT VISIT HI MDM: CPT | Mod: 25

## 2022-02-13 PROCEDURE — 70553 MRI BRAIN STEM W/O & W/DYE: CPT

## 2022-02-13 PROCEDURE — 71046 X-RAY EXAM CHEST 2 VIEWS: CPT

## 2022-02-13 PROCEDURE — 80053 COMPREHEN METABOLIC PANEL: CPT | Performed by: EMERGENCY MEDICINE

## 2022-02-13 PROCEDURE — 96375 TX/PRO/DX INJ NEW DRUG ADDON: CPT | Mod: 59

## 2022-02-13 PROCEDURE — 85379 FIBRIN DEGRADATION QUANT: CPT | Performed by: EMERGENCY MEDICINE

## 2022-02-13 PROCEDURE — 96374 THER/PROPH/DIAG INJ IV PUSH: CPT | Mod: 59

## 2022-02-13 PROCEDURE — 96361 HYDRATE IV INFUSION ADD-ON: CPT

## 2022-02-13 PROCEDURE — 93005 ELECTROCARDIOGRAM TRACING: CPT

## 2022-02-13 PROCEDURE — 85025 COMPLETE CBC W/AUTO DIFF WBC: CPT | Performed by: EMERGENCY MEDICINE

## 2022-02-13 PROCEDURE — 250N000011 HC RX IP 250 OP 636: Performed by: EMERGENCY MEDICINE

## 2022-02-13 PROCEDURE — A9585 GADOBUTROL INJECTION: HCPCS | Performed by: EMERGENCY MEDICINE

## 2022-02-13 PROCEDURE — 258N000003 HC RX IP 258 OP 636: Performed by: EMERGENCY MEDICINE

## 2022-02-13 RX ORDER — HYDROCODONE BITARTRATE AND ACETAMINOPHEN 5; 325 MG/1; MG/1
1 TABLET ORAL EVERY 6 HOURS PRN
Qty: 10 TABLET | Refills: 0 | Status: SHIPPED | OUTPATIENT
Start: 2022-02-13 | End: 2022-02-17

## 2022-02-13 RX ORDER — KETOROLAC TROMETHAMINE 15 MG/ML
15 INJECTION, SOLUTION INTRAMUSCULAR; INTRAVENOUS ONCE
Status: COMPLETED | OUTPATIENT
Start: 2022-02-13 | End: 2022-02-13

## 2022-02-13 RX ORDER — LORAZEPAM 2 MG/ML
0.5 INJECTION INTRAMUSCULAR ONCE
Status: COMPLETED | OUTPATIENT
Start: 2022-02-13 | End: 2022-02-13

## 2022-02-13 RX ORDER — GADOBUTROL 604.72 MG/ML
10 INJECTION INTRAVENOUS ONCE
Status: COMPLETED | OUTPATIENT
Start: 2022-02-13 | End: 2022-02-13

## 2022-02-13 RX ORDER — GABAPENTIN 300 MG/1
300 CAPSULE ORAL 3 TIMES DAILY
Qty: 30 CAPSULE | Refills: 0 | Status: SHIPPED | OUTPATIENT
Start: 2022-02-13 | End: 2022-03-14

## 2022-02-13 RX ADMIN — SODIUM CHLORIDE 1000 ML: 9 INJECTION, SOLUTION INTRAVENOUS at 16:52

## 2022-02-13 RX ADMIN — KETOROLAC TROMETHAMINE 15 MG: 15 INJECTION, SOLUTION INTRAMUSCULAR; INTRAVENOUS at 18:47

## 2022-02-13 RX ADMIN — LORAZEPAM 0.5 MG: 2 INJECTION INTRAMUSCULAR; INTRAVENOUS at 17:03

## 2022-02-13 RX ADMIN — GADOBUTROL 10 ML: 604.72 INJECTION INTRAVENOUS at 17:50

## 2022-02-13 NOTE — ED TRIAGE NOTES
Pt states that she was seen several days ago for numbness and burning pain. Pt states that she is continuing to have these symptoms and is unable to function at home. ABC intact.

## 2022-02-13 NOTE — LETTER
February 13, 2022      To Whom It May Concern:      Jimmy Stevens was seen in our Emergency Department today, 02/13/22.  Please excuse her from work until she sees her Neurologist on 2/18.    Sincerely,        Smiley ZIMMERMAN RN

## 2022-02-13 NOTE — ED PROVIDER NOTES
History     Chief Complaint:  Numbness     HPI   Jimmy Stevens is a 56 year old female who presents with multiple concerns.  The patient was seen initially in the emergency department on February 7 for numbness and tingling of the bilateral hands.  She was diagnosed with possible carpal tunnel syndrome and recommended rest and wearing of wrist braces with close outpatient follow-up with neurology.  Patient has a neurology appointment scheduled but has not yet been able to go to this.  Since her initial visit the symptoms have worsened and she notes worsening numbness tingling and difficulty grasping objects with her bilateral hands.  Doing any activities with her hands seems to worsen the symptoms.  She also reports numbness and tingling in the toes of her right foot and burning pain which radiates up and down the right side of her back at the mid scapular line.  Patient denies any trauma.  She denies any headache.  No fever, nausea vomiting or diarrhea.  She reports that at times she has felt short of breath but denies any chest pain.  No recent cough or illness.  She denies any proximal muscle weakness or proximal numbness.  She has been taking the prescribed gabapentin at night however this does not provide her with any relief and the symptoms are keeping her up at night.  The pain on the right side of her back extends from her shoulder all the way down into her buttock.  She denies any recent falls or injuries.  No bowel or bladder dysfunction.  No saddle anesthesia.  Patient denies any other symptoms at this time.    ROS:  Review of Systems   Constitutional: Positive for activity change and fatigue. Negative for fever.   HENT: Negative for sore throat.    Respiratory: Positive for chest tightness and shortness of breath. Negative for cough.    Cardiovascular: Negative for chest pain, palpitations and leg swelling.   Gastrointestinal: Negative for abdominal pain, nausea and vomiting.   Genitourinary:  Positive for flank pain. Negative for difficulty urinating, dysuria and hematuria.   Musculoskeletal: Positive for back pain. Negative for myalgias.   Skin: Negative for rash.   Neurological: Positive for weakness and numbness. Negative for syncope.   All other systems reviewed and are negative.         Allergies:  H2 Antagonists  Minocycline     Medications:    Acetaminophen (TYLENOL PO)  gabapentin (NEURONTIN) 300 MG capsule  lisinopril (ZESTRIL) 20 MG tablet  Oxymetazoline HCl (VICKS SINEX 12 HOUR NA)        Past Medical History:    Past Medical History:   Diagnosis Date     Acute stress reaction 9/9/2009     Essential hypertension, benign 3/15/2016     Hyperlipidemia LDL goal <160 10/31/2010     Iron deficiency anemia, unspecified      Knee pain 9/9/2009     Patient Active Problem List   Diagnosis     iamCERVICALGIA     iamTENSION HEADACHE     iamPAIN IN LIMB     Acute stress reaction     Knee pain     HYPERLIPIDEMIA LDL GOAL <160     Essential hypertension, benign     Obesity (BMI 35.0-39.9) with comorbidity (H)     Chronic bilateral low back pain without sciatica        Past Surgical History:    Past Surgical History:   Procedure Laterality Date      REMOVAL OF OVARY/TUBE(S)  3/99    Salpingo-Oophorectomy, Unilateral     HYSTERECTOMY, PAP NO LONGER INDICATED  3/99     [facility] NONSPECIFIC PROCEDURE  10/29/99    MRI brain normal     [facility] NONSPECIFIC PROCEDURE  02/00    B breast reduction surgery     [facility] TOTAL ABDOM HYSTERECTOMY  3/99    Hysterectomy, Total Abdominal        Family History:    family history includes Family History Negative in her brother, father, and sister; Genitourinary Problems in her mother.    Social History:   reports that she has never smoked. She has never used smokeless tobacco. She reports that she does not drink alcohol and does not use drugs.  PCP: Nick Calderon     Physical Exam     Patient Vitals for the past 24 hrs:   BP Temp Temp src Pulse Resp SpO2   02/13/22 1530 -- 98.5  F (36.9   C) Temporal -- -- --   02/13/22 1529 (!) 158/70 -- -- 113 16 100 %        Physical Exam  General: Nontoxic. Resting comfortably  Head:  Scalp, face, and head appear normal  Eyes:  Pupils are equal, round, reactive to light EOMI, no nystagmus     Conjunctivae non-injected and sclerae white  ENT:    The external nose is normal    Pinnae are normal  Neck:  Normal range of motion    There is no rigidity noted    Trachea is in the midline  CV:  Tachycardic rate, regular rhythm     Normal S1/S2, no S3/S4    No murmur or rub. Radial pulses 2+ bilaterally. DP pulses 2+ and intact bilaterally.  Resp:  Lungs are clear and equal bilaterally  There is no tachypnea    No increased work of breathing    No rales, wheezing, or rhonchi  GI:  Abdomen is soft, obese, no rigidity or guarding    No distension, or mass    No tenderness or rebound tenderness   MS:  Normal muscular tone. Mild diffuse tenderness to palpation of the soft tissues of the right back from the posterior shoulder to the upper buttock without overlying skin changes. No definite bony tenderness to palpation. T and L spine non-tender without stepoffs.    Symmetric motor strength    No lower extremity edema  Skin:  No rash or acute skin lesions noted  Neuro: A&Ox3, GCS 15    CN II - XII intact    Speech clear, fluent, and normal    Strength 5/5 and symmetric in bilateral upper and lower extremities.    No pronator drift. No leg drift. Patient reports subjective decreased sensation in all bilateral fingertips and the toes in the right foot. Sensation otherwise intact.    No ankle clonus    FTN testing normal. No tremor.     No meningismus   Psych:  Anxious affect. Appropriate interactions.      Emergency Department Course   ECG:  Completed at 1640.  Read at 1643.   Sinus tachycardia   Nonspecific T wave abnormality  Abnormal ECG  No significant changes as compared to ECG dated 11/17/2016  Rate 110 bpm. LA interval 146. QRS duration 88. QT/QTc 364/492. P-R-T axes 52 -6  13.    Imaging:  XR Chest 2 Views   Final Result   IMPRESSION: Mild torsion aorta. Heart size upper limits of normal. Pulmonary venous hypertension without CHF.      MR Brain w/o & w Contrast   Final Result   IMPRESSION:   1.  No evidence of acute infarction or other acute intracranial abnormality.   2.  Mild scattered T2-hyperintense white matter signal abnormalities are nonspecific but commonly reflect chronic small vessel ischemic change. Chronic migraine sequelae and demyelination could exhibit a similar appearance.   3.  Small bilateral mastoid effusions. Mastoiditis should be excluded clinically.   4.  Empty sella configuration. The finding is most often incidental. However, the finding can be seen in association with idiopathic intracranial hypertension among a subset of patients         Report per radiology    Laboratory:  Labs Ordered and Resulted from Time of ED Arrival to Time of ED Departure   COMPREHENSIVE METABOLIC PANEL - Abnormal       Result Value    Sodium 139      Potassium 3.4      Chloride 106      Carbon Dioxide (CO2) 30      Anion Gap 3      Urea Nitrogen 13      Creatinine 0.76      Calcium 8.9      Glucose 121 (*)     Alkaline Phosphatase 70      AST 7      ALT 17      Protein Total 7.5      Albumin 3.2 (*)     Bilirubin Total 0.4      GFR Estimate >90     D DIMER QUANTITATIVE - Normal    D-Dimer Quantitative 0.37     CBC WITH PLATELETS AND DIFFERENTIAL    WBC Count 8.8      RBC Count 5.12      Hemoglobin 14.0      Hematocrit 43.2      MCV 84      MCH 27.3      MCHC 32.4      RDW 13.4      Platelet Count 330      % Neutrophils 62      % Lymphocytes 31      % Monocytes 6      % Eosinophils 1      % Basophils 0      % Immature Granulocytes 0      NRBCs per 100 WBC 0      Absolute Neutrophils 5.4      Absolute Lymphocytes 2.7      Absolute Monocytes 0.5      Absolute Eosinophils 0.1      Absolute Basophils 0.0      Absolute Immature Granulocytes 0.0      Absolute NRBCs 0.0          Procedures    None    Emergency Department Course:    Reviewed:  I reviewed nursing notes, vitals and past medical history    Assessments:   I obtained history and examined the patient as noted above.    I rechecked the patient and explained findings.     Consults:   None    Interventions:  Medications   LORazepam (ATIVAN) injection 0.5 mg (0.5 mg Intravenous Given 2/13/22 1703)   0.9% sodium chloride BOLUS (1,000 mLs Intravenous New Bag 2/13/22 1652)   gadobutrol (GADAVIST) injection 10 mL (10 mLs Intravenous Given 2/13/22 1750)        Disposition:  The patient was discharged to home.     Impression & Plan        Covid-19  Jimmy Stevens was evaluated during a global COVID-19 pandemic, which necessitated consideration that the patient might be at risk for infection with the SARS-CoV-2 virus that causes COVID-19.   Applicable protocols for evaluation were followed during the patient's care.   COVID-19 was considered as part of the patient's evaluation.     Medical Decision Making:  Jimmy Stevens is a 56 year old female who presents to the emergency department multiple concerns including paresthesias and subjective numbness to her bilateral fingertips and right toes as well as back pain along the entirety of her right back from the posterior shoulder down to her buttock.  No definite focal neurologic deficits on examination. No trauma. CBC, CMP, DDimer, EKG unremarkable. CXR neg for any acute thoracic process. MRI brain with likely sequelae of chronic small vessel ischemic change, though demyelination could also contribute to such findings. The exact etiology of her symptoms is unclear. Her paresthesias may be related to peripheral polyneuropathy of unclear etiology. She has an appointment scheduled with Neurology this coming week. At this time there is no indication for hospitalization. I stressed the importance of close neurology follow up as scheduled as well as PCP follow up. Will increase her gabapentin to TID to  try and better control her symptoms. Back pain likely musculoskeletal in origin. No evidence to suggest cord compression or cauda equina syndrome. Patient to continue tylenol/ibuprofen, heat, norco PRN. Patient agreeable with plan of care. Return precautions were discussed with patient. The patient's questions were answered and the patient was agreeable with discharge. Patient discharged in stable condition.      Diagnosis:    ICD-10-CM    1. Peripheral polyneuropathy  G62.9         Discharge Medications:  Discharge Medication List as of 2/13/2022  7:58 PM      START taking these medications    Details   HYDROcodone-acetaminophen (NORCO) 5-325 MG tablet Take 1 tablet by mouth every 6 hours as needed for severe pain, Disp-10 tablet, R-0, Local Print              2/13/2022   Jacob Dotson MD Daro, Ryan Clay, MD  02/15/22 1914

## 2022-02-15 VITALS
TEMPERATURE: 98.5 F | DIASTOLIC BLOOD PRESSURE: 115 MMHG | RESPIRATION RATE: 16 BRPM | HEART RATE: 109 BPM | SYSTOLIC BLOOD PRESSURE: 180 MMHG | OXYGEN SATURATION: 100 %

## 2022-02-15 LAB
ATRIAL RATE - MUSE: 110 BPM
DIASTOLIC BLOOD PRESSURE - MUSE: NORMAL MMHG
INTERPRETATION ECG - MUSE: NORMAL
P AXIS - MUSE: 52 DEGREES
PR INTERVAL - MUSE: 146 MS
QRS DURATION - MUSE: 88 MS
QT - MUSE: 364 MS
QTC - MUSE: 492 MS
R AXIS - MUSE: -6 DEGREES
SYSTOLIC BLOOD PRESSURE - MUSE: NORMAL MMHG
T AXIS - MUSE: 13 DEGREES
VENTRICULAR RATE- MUSE: 110 BPM

## 2022-02-15 ASSESSMENT — ENCOUNTER SYMPTOMS
BACK PAIN: 1
SORE THROAT: 0
CHEST TIGHTNESS: 1
FLANK PAIN: 1
FATIGUE: 1
MYALGIAS: 0
ACTIVITY CHANGE: 1
PALPITATIONS: 0
WEAKNESS: 1
VOMITING: 0
NUMBNESS: 1
HEMATURIA: 0
FEVER: 0
SHORTNESS OF BREATH: 1
NAUSEA: 0
ABDOMINAL PAIN: 0
DIFFICULTY URINATING: 0
COUGH: 0
DYSURIA: 0

## 2022-02-16 ENCOUNTER — HOSPITAL ENCOUNTER (EMERGENCY)
Facility: CLINIC | Age: 57
Discharge: HOME | End: 2022-02-16
Attending: EMERGENCY MEDICINE | Admitting: EMERGENCY MEDICINE
Payer: COMMERCIAL

## 2022-02-16 VITALS
HEART RATE: 100 BPM | TEMPERATURE: 97.4 F | SYSTOLIC BLOOD PRESSURE: 179 MMHG | DIASTOLIC BLOOD PRESSURE: 113 MMHG | OXYGEN SATURATION: 97 % | RESPIRATION RATE: 18 BRPM

## 2022-02-16 DIAGNOSIS — R20.2 PARESTHESIAS: ICD-10-CM

## 2022-02-16 DIAGNOSIS — M54.6 LEFT-SIDED THORACIC BACK PAIN, UNSPECIFIED CHRONICITY: ICD-10-CM

## 2022-02-16 DIAGNOSIS — I10 HYPERTENSION, UNSPECIFIED TYPE: ICD-10-CM

## 2022-02-16 LAB
ALBUMIN UR-MCNC: NEGATIVE MG/DL
APPEARANCE UR: CLEAR
BILIRUB UR QL STRIP: NEGATIVE
COLOR UR AUTO: ABNORMAL
GLUCOSE UR STRIP-MCNC: NEGATIVE MG/DL
HGB UR QL STRIP: NEGATIVE
KETONES UR STRIP-MCNC: NEGATIVE MG/DL
LEUKOCYTE ESTERASE UR QL STRIP: NEGATIVE
MUCOUS THREADS #/AREA URNS LPF: PRESENT /LPF
NITRATE UR QL: NEGATIVE
PH UR STRIP: 5.5 [PH] (ref 5–7)
RBC URINE: <1 /HPF
SP GR UR STRIP: 1.01 (ref 1–1.03)
SQUAMOUS EPITHELIAL: <1 /HPF
UROBILINOGEN UR STRIP-MCNC: NORMAL MG/DL
WBC URINE: <1 /HPF

## 2022-02-16 PROCEDURE — 250N000013 HC RX MED GY IP 250 OP 250 PS 637: Performed by: EMERGENCY MEDICINE

## 2022-02-16 PROCEDURE — 99283 EMERGENCY DEPT VISIT LOW MDM: CPT

## 2022-02-16 PROCEDURE — 81001 URINALYSIS AUTO W/SCOPE: CPT | Performed by: EMERGENCY MEDICINE

## 2022-02-16 RX ORDER — LIDOCAINE 4 G/G
1 PATCH TOPICAL ONCE
Status: DISCONTINUED | OUTPATIENT
Start: 2022-02-16 | End: 2022-02-17 | Stop reason: HOSPADM

## 2022-02-16 RX ORDER — METHOCARBAMOL 750 MG/1
750 TABLET, FILM COATED ORAL 4 TIMES DAILY PRN
Qty: 15 TABLET | Refills: 0 | Status: SHIPPED | OUTPATIENT
Start: 2022-02-16 | End: 2022-03-08

## 2022-02-16 RX ORDER — LIDOCAINE 50 MG/G
1 PATCH TOPICAL EVERY 24 HOURS
Qty: 5 PATCH | Refills: 0 | Status: SHIPPED | OUTPATIENT
Start: 2022-02-16 | End: 2022-02-21

## 2022-02-16 RX ORDER — METHOCARBAMOL 750 MG/1
750 TABLET, FILM COATED ORAL ONCE
Status: COMPLETED | OUTPATIENT
Start: 2022-02-16 | End: 2022-02-16

## 2022-02-16 RX ADMIN — METHOCARBAMOL 750 MG: 750 TABLET ORAL at 20:31

## 2022-02-16 RX ADMIN — LIDOCAINE 1 PATCH: 246 PATCH TOPICAL at 20:33

## 2022-02-16 RX ADMIN — LIDOCAINE 1 PATCH: 246 PATCH TOPICAL at 21:43

## 2022-02-17 ENCOUNTER — OFFICE VISIT (OUTPATIENT)
Dept: INTERNAL MEDICINE | Facility: CLINIC | Age: 57
End: 2022-02-17
Payer: COMMERCIAL

## 2022-02-17 VITALS
BODY MASS INDEX: 37.68 KG/M2 | WEIGHT: 212.7 LBS | OXYGEN SATURATION: 98 % | SYSTOLIC BLOOD PRESSURE: 142 MMHG | DIASTOLIC BLOOD PRESSURE: 90 MMHG | TEMPERATURE: 98.2 F | HEART RATE: 106 BPM

## 2022-02-17 DIAGNOSIS — I10 ESSENTIAL HYPERTENSION, BENIGN: ICD-10-CM

## 2022-02-17 DIAGNOSIS — M54.42 ACUTE BILATERAL LOW BACK PAIN WITH LEFT-SIDED SCIATICA: ICD-10-CM

## 2022-02-17 DIAGNOSIS — Z09 ENCOUNTER FOR EXAMINATION FOLLOWING TREATMENT AT HOSPITAL: Primary | ICD-10-CM

## 2022-02-17 DIAGNOSIS — R73.9 HYPERGLYCEMIA: ICD-10-CM

## 2022-02-17 DIAGNOSIS — E66.01 MORBID OBESITY (H): ICD-10-CM

## 2022-02-17 LAB — HBA1C MFR BLD: 5.8 % (ref 0–5.6)

## 2022-02-17 PROCEDURE — 36415 COLL VENOUS BLD VENIPUNCTURE: CPT | Performed by: INTERNAL MEDICINE

## 2022-02-17 PROCEDURE — 83036 HEMOGLOBIN GLYCOSYLATED A1C: CPT | Performed by: INTERNAL MEDICINE

## 2022-02-17 PROCEDURE — 99214 OFFICE O/P EST MOD 30 MIN: CPT | Performed by: INTERNAL MEDICINE

## 2022-02-17 NOTE — LETTER
February 17, 2022      Jimmy Stevens  [address]  [place] [facility] [place] [id number]        To Whom It May Concern:    Jimmy Stevens was seen on 02/17/2022.  She is dealing with an undiagnosed illness that prevents her from working.  She is seeing a specialist on 02/18/2022 as a next step. Please excuse her from work until at least 02/19/2022 due to this illness.  Further work restrictions will be depending on this specialist appointment.        Sincerely,        Guevara Toro MD, MPH  [facility]  Internal Medicine

## 2022-02-17 NOTE — PROGRESS NOTES
Assessment & Plan     Encounter for examination following treatment at hospital  Acute low back pain  Unclear etiology of back pain and limb numbness. I am glad she has follow-up with neurology tomorrow as I do think that is a fair next step. She inquired about injections which I think would be reasonable but would need an obvious target, will defer possible MRI to neurology tomorrow. Diabetes screening and pain control as below. Short term disability parking pass filled out. Work note given to get her to the neurology appointment.    Essential hypertension, benign  BP spikes concerning though they seem related to her pain. I encouraged them to treat her pain more aggressively with the pain meds given by the ER when her BP is spiking.    Hyperglycemia  Jimmy requested diabetes testing today. She has had some hyperglycemia on past labs. A1c today.  - Hemoglobin A1c; Future    Morbid obesity (H)  Known issue that I take into account for their medical decisions, no current exacerbations or new concerns. BMI 37. Weight loss would help with HTN.    F/u with regular PCP at regular interval or sooner PRN    Guevara Toro MD  [facility]   Jimmy is a 56 year old who presents for ER follow-up alongside her daughter. This is the first time I have met Jimmy, who was seen in the ER on 2/16/2022, 2/13/22 and 2/7/22 for back pain and toes/finger numbness. This has persisted. Her BP goes as high as 190s/100s when her pain gets bad. MRI brain during 2/13/22 ER visit did not show any acute abnormalities. She is seeing a neurologist tomorrow. She is wondering about injections for pain control, a work note, and disability parking pass.    Review of Systems   Constitutional, MSK, neuro systems are negative, except as otherwise noted.      Objective    BP (!) 142/90   Pulse 106   Temp 98.2  F (36.8  C) (Tympanic)   Wt 96.5 kg (212 lb 11.2 oz)   SpO2 98%   BMI 37.68 kg/m     Body mass index is 37.68 kg/m .     Physical Exam   GENERAL: seated in chair, appears in distress  MSK: Moves all four extremities freely. Antalgic gait. TTP over L flank and L buttock.  SKIN: No significant ulcers, lesions, or rashes on the visualized portions of the skin  NEURO: CN II-XII grossly intact.

## 2022-02-17 NOTE — DISCHARGE INSTRUCTIONS
Discharge Instructions  Back Pain  You were seen today for back pain. Back pain can have many causes, but most will get better without surgery or other specific treatment. Sometimes there is a herniated ( slipped ) disc. We don t usually do MRI scans to look for these right away, since most herniated discs will get better on their own with time.  Today, we did not find any evidence that your back pain was caused by a serious condition, such as an infection, fracture, or tumor. However, sometimes symptoms develop over time and cannot be found during an emergency visit, so it is very important that you follow up with your primary doctor.  Return to the Emergency Department if:  You develop a fever with your back pain.   You have weakness or change in sensation in one or both legs.  You lose control of your bowels or bladder, or can t empty your bladder.  Your pain gets much worse.     Follow-up with your doctor:  Unless your pain has completely gone away, please make an appointment with your doctor within one week.  You may need further management of your back pain, such as more pain medication, imaging such as an X-ray or MRI, or physical therapy.    What can I do to help myself?  Remain Active -- People are often afraid that they will hurt their back further or delay recovery by remaining active, but this is one of the best things you can do for your back. In fact, prolonged bed rest is not recommended. Studies have shown that people with low back pain recover faster when they remain active. Movement helps to bring blood flow to the muscles and relieve muscle spasms as well as preventing loss of muscle strength.  Heat -- Using a heating pad can help with low back pain during the first few weeks. Do not sleep with a heating pad, as you can be burned.   Pain medications - You may take a pain medication such as Tylenol  (acetaminophen), Advil , Nuprin  (ibuprofen) or Aleve  (naproxen).  If you have been given a  narcotic such as Vicodin  (hydrocodone with acetaminophen), Percocet  (oxycodone with acetaminophen), codeine, or a muscle relaxant such as Flexeril  (cyclobenzaprine) or Soma  (carisoprodol), do not drive for four hours after you have taken it. If the narcotic contains Tylenol  (acetaminophen), do not take Tylenol  with it. All narcotics will cause constipation, so eat a high fiber diet.   If you were given a prescription for medicine here today, be sure to read all of the information (including the package insert) that comes with your prescription.  This will include important information about the medicine, its side effects, and any warnings that you need to know about.  The pharmacist who fills the prescription can provide more information and answer questions you may have about the medicine.  If you have questions or concerns that the pharmacist cannot address, please call or return to the Emergency Department.   Opioid Medication Information    Pain medications are among the most commonly prescribed medicines, so we are including this information for all our patients. If you did not receive pain medication or get a prescription for pain medicine, you can ignore it.     You may have been given a prescription for an opioid (narcotic) pain medicine and/or have received a pain medicine while here in the Emergency Department. These medicines can make you drowsy or impaired. You must not drive, operate dangerous equipment, or engage in any other dangerous activities while taking these medications. If you drive while taking these medications, you could be arrested for DUI, or driving under the influence. Do not drink any alcohol while you are taking these medications.     Opioid pain medications can cause addiction. If you have a history of chemical dependency of any type, you are at a higher risk of becoming addicted to pain medications.  Only take these prescribed medications to treat your pain when all other  options have been tried. Take it for as short a time and as few doses as possible. Store your pain pills in a secure place, as they are frequently stolen and provide a dangerous opportunity for children or visitors in your house to start abusing these powerful medications. We will not replace any lost or stolen medicine.  As soon as your pain is better, you should flush all your remaining medication.     Many prescription pain medications contain Tylenol  (acetaminophen), including Vicodin , Tylenol #3 , Norco , Lortab , and Percocet .  You should not take any extra pills of Tylenol  if you are using these prescription medications or you can get very sick.  Do not ever take more than 3000 mg of acetaminophen in any 24 hour period.    All opioids tend to cause constipation. Drink plenty of water and eat foods that have a lot of fiber, such as fruits, vegetables, prune juice, apple juice and high fiber cereal.  Take a laxative if you don t move your bowels at least every other day. Miralax , Milk of Magnesia, Colace , or Senna  can be used to keep you regular.      Remember that you can always come back to the Emergency Department if you are not able to see your regular doctor in the amount of time listed above, if you get any new symptoms, or if there is anything that worries you.    Discharge Instructions  Hypertension - High Blood Pressure    During you visit to the Emergency Department, your blood pressure was higher than the recommended blood pressure.  This may be related to stress, pain, medication or other temporary conditions. In these cases, your blood pressure may return to normal on its own. If you have a history of high blood pressure, you may need to have your doctor adjust your medications. Sometimes, your high measurement here may indicate that you have developed high blood pressure that will stay high unless it is treated. Sudden very high blood pressure can cause problems, but usually high blood  pressure causes problems over months to years.      Blood pressure is almost never lowered in the Emergency Department, because studies have shown that lowering blood pressure too quickly is much more dangerous than leaving it alone.    You need to follow up with your doctor in 1-3 days to get your blood pressure rechecked.     Return to the Emergency Department if you start to have:  A severe headache.  Chest pain.  Shortness of breath.  Weakness or numbness that affects one part of the body.  Confusion.  Vision changes.  Significant swelling of legs and/or eyes.  A reaction to any medication started in the Emergency Department.    What can I do to help myself?  Avoid alcohol.  Take any blood pressure medicine that you are prescribed.  Get a good night s sleep.  Lower your salt intake.  Exercise.  Lose weight.  Manage stress.    If blood pressure medication was started in the Emergency Department:  The medicine may not have an immediate effect. The body and brain determine what blood pressure you have. The medicine s job is to retrain the body s  thermostat  to a lower blood pressure.  You will need to follow up with your doctor to see how this medicine is working for you.  If you were given a prescription for medicine here today, be sure to read all of the information (including the package insert) that comes with your prescription.  This will include important information about the medicine, its side effects, and any warnings that you need to know about.  The pharmacist who fills the prescription can provide more information and answer questions you may have about the medicine.  If you have questions or concerns that the pharmacist cannot address, please call or return to the Emergency Department.   Opioid Medication Information    Pain medications are among the most commonly prescribed medicines, so we are including this information for all our patients. If you did not receive pain medication or get a  prescription for pain medicine, you can ignore it.     You may have been given a prescription for an opioid (narcotic) pain medicine and/or have received a pain medicine while here in the Emergency Department. These medicines can make you drowsy or impaired. You must not drive, operate dangerous equipment, or engage in any other dangerous activities while taking these medications. If you drive while taking these medications, you could be arrested for DUI, or driving under the influence. Do not drink any alcohol while you are taking these medications.     Opioid pain medications can cause addiction. If you have a history of chemical dependency of any type, you are at a higher risk of becoming addicted to pain medications.  Only take these prescribed medications to treat your pain when all other options have been tried. Take it for as short a time and as few doses as possible. Store your pain pills in a secure place, as they are frequently stolen and provide a dangerous opportunity for children or visitors in your house to start abusing these powerful medications. We will not replace any lost or stolen medicine.  As soon as your pain is better, you should flush all your remaining medication.     Many prescription pain medications contain Tylenol  (acetaminophen), including Vicodin , Tylenol #3 , Norco , Lortab , and Percocet .  You should not take any extra pills of Tylenol  if you are using these prescription medications or you can get very sick.  Do not ever take more than 3000 mg of acetaminophen in any 24 hour period.    All opioids tend to cause constipation. Drink plenty of water and eat foods that have a lot of fiber, such as fruits, vegetables, prune juice, apple juice and high fiber cereal.  Take a laxative if you don t move your bowels at least every other day. Miralax , Milk of Magnesia, Colace , or Senna  can be used to keep you regular.      Remember that you can always come back to the Emergency  Department if you are not able to see your regular doctor in the amount of time listed above, if you get any new symptoms, or if there is anything that worries you.

## 2022-02-17 NOTE — ED PROVIDER NOTES
History     Chief Complaint:    Back Pain      HPI   Jimmy Stevens is a 56 year old female who has a history of chronic low back pain without sciatica and hypertension.  She has ER visits on the seventh and 13th of this month both for complaints of back pain and tingling and weakness of both hands.  On the visit on the seventh she was diagnosed with carpal tunnel on the 13th she had an MRI of her brain that did not show a stroke there were white matter signal abnormalities nonspecific but could be due to migraines.  She had labs that were reassuring except for elevated glucose negative D-dimer.  The patient says she is continued to have pain over her left flank which is sharp goes up over the scapula and is burning and comes when she sits or stands too long.  It makes her want to change position does not radiate her back.  She has no abdominal pain.  The patient reports that she has had numbness and weakness of her both hands that she has been dropping some money there is been no neck pain no head trauma.  She said her symptoms been present for years.  She said however after she slipped on the seventh on some ice she had increasing pain and symptoms.  The patient does have follow-up scheduled her primary doctor tomorrow and coming shortly with neurology.  Pain was worse so she presented tonight.    Review of Systems  10 point review of systems all negative except as in HPI    Allergies:    H2 Antagonists  Minocycline      Medications:      Acetaminophen (TYLENOL PO)  gabapentin (NEURONTIN) 300 MG capsule  HYDROcodone-acetaminophen (NORCO) 5-325 MG tablet  lisinopril (ZESTRIL) 20 MG tablet  Oxymetazoline HCl (VICKS SINEX 12 HOUR NA)        Past Medical History:      Past Medical History:   Diagnosis Date     Acute stress reaction 9/9/2009     Essential hypertension, benign 3/15/2016     Hyperlipidemia LDL goal <160 10/31/2010     Iron deficiency anemia, unspecified      Knee pain 9/9/2009     Patient Active  Problem List    Diagnosis Date Noted     Obesity (BMI 35.0-39.9) with comorbidity (H) 04/11/2019     Priority: Medium     Chronic bilateral low back pain without sciatica 04/11/2019     Priority: Medium     Essential hypertension, benign 03/15/2016     Priority: Medium     HYPERLIPIDEMIA LDL GOAL <160 10/31/2010     Priority: Medium     Acute stress reaction 09/09/2009     Priority: Medium     Knee pain 09/09/2009     Priority: Medium     iamCERVICALGIA 07/25/2005     Priority: Medium     iamTENSION HEADACHE 07/25/2005     Priority: Medium     iamPAIN IN LIMB 07/25/2005     Priority: Medium        Past Surgical History:      Past Surgical History:   Procedure Laterality Date     [facility] REMOVAL OF OVARY/TUBE(S)  3/99    Salpingo-Oophorectomy, Unilateral     HYSTERECTOMY, PAP NO LONGER INDICATED  3/99     [facility] NONSPECIFIC PROCEDURE  10/29/99    MRI brain normal     [facility] NONSPECIFIC PROCEDURE  02/00    B breast reduction surgery     [facility] TOTAL ABDOM HYSTERECTOMY  3/99    Hysterectomy, Total Abdominal       Family History:      Family History   Problem Relation Age of Onset     Genitourinary Problems Mother         B:1942 Alive     Family History Negative Father         B:1940 Alive     Family History Negative Sister         5 sisters all healthy     Family History Negative Brother         1 brother healthy       Social History:  Here with her daughter.  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    HENT: Mucous membranes moist.  Atraumatic.    Cardiovascular: Regular rate and rhythm. Good pulses in all four extremities. Normal capillary refill and skin turgor.      Respiratory: Lungs are clear. No nasal flaring. No retractions. No wheezing, no crackles.    Gastrointestinal: Abdomen soft. No guarding, no rebound. No palpable hernias.     Musculoskeletal: No gross deformity.  No flank  pain to percussion.  No muscle wasting.    Skin: No rashes or petechiae.  No zoster present    Neurologic: The patient is alert and oriented. GCS 15.  Follows commands with clear and appropriate speech. Gives appropriate answers.  Patient can move her upper and lower extremities.  She reports decreased  strength but can hold onto things.  No proximal weakness.  Gross sensation in her extremities intact reports subjective decrease in the hands.    Lymphatic: No cervical adenopathy. No lower extremity swelling.    Psychiatric: The patient is non-tearful.    Emergency Department Course       Previous labs and imaging reviewed  Procedures:      Emergency Department Course:           Reviewed:    I reviewed nursing notes, vitals and past history    Assessments:   I obtained history and examined the patient as noted above.   2115 I rechecked the patient and explained findings patient reports that the pain is improved and requested a second Lidoderm patch..              Interventions:    Medications   Lidocaine (LIDOCARE) 4 % Patch 1 patch (has no administration in time range)   methocarbamol (ROBAXIN) tablet 750 mg (has no administration in time range)       Disposition:  The patient was discharged to home.    Impression & Plan        Medical Decision Making:  The patient has a history of years long of similar type pain.  Her blood pressure is elevated with a history of hypertension and pain that is likely the cause behind this.  I did review her previous visits notes and results.  She has had an MRI and is what was discussed with the patient.  I do not think that there is a single lesion or cause in the nerve system that could cause her to have flank pain and numbness tingling in both hands.   She can move her extremities adequately.  I do feel she needs to follow-up with neurology.  I felt that the back pain is more likely musculoskeletal in nature.  I did visualize her have an episode where she appeared to have spasm and try to change position which helped her pain.  I did consider zoster intra-abdominal causes like dissection kidney stone pyelonephritis pneumonia amongst others but felt this is unlikely.  She is already had imaging.  We discussed CT scan but will hold off to the risk of radiation.  I think it is reasonable that the patient follow-up with neurology but feel that she should probably start a muscle relaxant.  Lidoderm patches were applied here I want her to see a back pain clinic as an outpatient.  The patient does not have a UTI but is feeling improved.    Covid-19  Jimmy Stevens was evaluated during a global COVID-19 pandemic, which necessitated consideration that the patient might be at risk for infection with the SARS-CoV-2 virus that causes COVID-19.   Applicable protocols for evaluation were followed during the patient's care.   COVID-19 was considered as part of the patient's evaluation.     Diagnosis:  1. Left-sided thoracic back pain, unspecified chronicity    2. Paresthesias    3. Hypertension, unspecified type             Miko Loo MD  02/16/22 [id number]

## 2022-02-22 ENCOUNTER — TRANSFERRED RECORDS (OUTPATIENT)
Dept: HEALTH INFORMATION MANAGEMENT | Facility: CLINIC | Age: 57
End: 2022-02-22
Payer: COMMERCIAL

## 2022-02-28 ENCOUNTER — TRANSFERRED RECORDS (OUTPATIENT)
Dept: HEALTH INFORMATION MANAGEMENT | Facility: CLINIC | Age: 57
End: 2022-02-28
Payer: COMMERCIAL

## 2022-03-01 DIAGNOSIS — I10 ESSENTIAL HYPERTENSION, BENIGN: ICD-10-CM

## 2022-03-02 RX ORDER — LISINOPRIL 20 MG/1
TABLET ORAL
Qty: 180 TABLET | Refills: 1 | Status: SHIPPED | OUTPATIENT
Start: 2022-03-02 | End: 2022-08-02

## 2022-03-08 ENCOUNTER — HOSPITAL ENCOUNTER (OUTPATIENT)
Facility: CLINIC | Age: 57
Setting detail: OBSERVATION
Discharge: HOME | End: 2022-03-11
Attending: EMERGENCY MEDICINE | Admitting: EMERGENCY MEDICINE
Payer: COMMERCIAL

## 2022-03-08 DIAGNOSIS — G62.9 NEUROPATHY: ICD-10-CM

## 2022-03-08 DIAGNOSIS — M62.81 GENERALIZED MUSCLE WEAKNESS: ICD-10-CM

## 2022-03-08 LAB
ALBUMIN SERPL-MCNC: 3.5 G/DL (ref 3.4–5)
ALP SERPL-CCNC: 59 U/L (ref 40–150)
ALT SERPL W P-5'-P-CCNC: 22 U/L (ref 0–50)
ANION GAP SERPL CALCULATED.3IONS-SCNC: 5 MMOL/L (ref 3–14)
APPEARANCE CSF: CLEAR
AST SERPL W P-5'-P-CCNC: 7 U/L (ref 0–45)
ATRIAL RATE - MUSE: 101 BPM
BASOPHILS # BLD AUTO: 0.1 10E3/UL (ref 0–0.2)
BASOPHILS NFR BLD AUTO: 1 %
BILIRUB SERPL-MCNC: 0.3 MG/DL (ref 0.2–1.3)
BUN SERPL-MCNC: 25 MG/DL (ref 7–30)
CALCIUM SERPL-MCNC: 9.3 MG/DL (ref 8.5–10.1)
CHLORIDE BLD-SCNC: 107 MMOL/L (ref 94–109)
CK SERPL-CCNC: 33 U/L (ref 30–225)
CO2 SERPL-SCNC: 27 MMOL/L (ref 20–32)
COLOR CSF: COLORLESS
CREAT SERPL-MCNC: 1.04 MG/DL (ref 0.52–1.04)
CRP SERPL-MCNC: 9.3 MG/L (ref 0–8)
DIASTOLIC BLOOD PRESSURE - MUSE: NORMAL MMHG
EOSINOPHIL # BLD AUTO: 0.1 10E3/UL (ref 0–0.7)
EOSINOPHIL NFR BLD AUTO: 1 %
ERYTHROCYTE [DISTWIDTH] IN BLOOD BY AUTOMATED COUNT: 13.3 % (ref 10–15)
ERYTHROCYTE [SEDIMENTATION RATE] IN BLOOD BY WESTERGREN METHOD: 37 MM/HR (ref 0–30)
GFR SERPL CREATININE-BSD FRML MDRD: 63 ML/MIN/1.73M2
GLUCOSE BLD-MCNC: 120 MG/DL (ref 70–99)
GLUCOSE CSF-MCNC: 68 MG/DL (ref 40–70)
HCT VFR BLD AUTO: 41.2 % (ref 35–47)
HGB BLD-MCNC: 12.8 G/DL (ref 11.7–15.7)
IMM GRANULOCYTES # BLD: 0 10E3/UL
IMM GRANULOCYTES NFR BLD: 0 %
INTERPRETATION ECG - MUSE: NORMAL
LYMPHOCYTES # BLD AUTO: 2.6 10E3/UL (ref 0.8–5.3)
LYMPHOCYTES NFR BLD AUTO: 27 %
MCH RBC QN AUTO: 27 PG (ref 26.5–33)
MCHC RBC AUTO-ENTMCNC: 31.1 G/DL (ref 31.5–36.5)
MCV RBC AUTO: 87 FL (ref 78–100)
MONOCYTES # BLD AUTO: 0.6 10E3/UL (ref 0–1.3)
MONOCYTES NFR BLD AUTO: 7 %
NEUTROPHILS # BLD AUTO: 6.2 10E3/UL (ref 1.6–8.3)
NEUTROPHILS NFR BLD AUTO: 64 %
NRBC # BLD AUTO: 0 10E3/UL
NRBC BLD AUTO-RTO: 0 /100
P AXIS - MUSE: 53 DEGREES
PLATELET # BLD AUTO: 340 10E3/UL (ref 150–450)
POTASSIUM BLD-SCNC: 4.2 MMOL/L (ref 3.4–5.3)
PR INTERVAL - MUSE: 152 MS
PROT CSF-MCNC: 42 MG/DL (ref 15–60)
PROT SERPL-MCNC: 7.7 G/DL (ref 6.8–8.8)
QRS DURATION - MUSE: 84 MS
QT - MUSE: 356 MS
QTC - MUSE: 461 MS
R AXIS - MUSE: 1 DEGREES
RBC # BLD AUTO: 4.74 10E6/UL (ref 3.8–5.2)
RBC # CSF MANUAL: 1 /UL (ref 0–2)
SODIUM SERPL-SCNC: 139 MMOL/L (ref 133–144)
SYSTOLIC BLOOD PRESSURE - MUSE: NORMAL MMHG
T AXIS - MUSE: 8 DEGREES
TUBE # CSF: 4
VENTRICULAR RATE- MUSE: 101 BPM
WBC # BLD AUTO: 9.6 10E3/UL (ref 4–11)
WBC # CSF MANUAL: 1 /UL (ref 0–5)

## 2022-03-08 PROCEDURE — 80053 COMPREHEN METABOLIC PANEL: CPT | Performed by: EMERGENCY MEDICINE

## 2022-03-08 PROCEDURE — 93005 ELECTROCARDIOGRAM TRACING: CPT | Mod: 59

## 2022-03-08 PROCEDURE — 85025 COMPLETE CBC W/AUTO DIFF WBC: CPT | Performed by: EMERGENCY MEDICINE

## 2022-03-08 PROCEDURE — 250N000011 HC RX IP 250 OP 636: Performed by: EMERGENCY MEDICINE

## 2022-03-08 PROCEDURE — 87070 CULTURE OTHR SPECIMN AEROBIC: CPT | Performed by: EMERGENCY MEDICINE

## 2022-03-08 PROCEDURE — 62270 DX LMBR SPI PNXR: CPT

## 2022-03-08 PROCEDURE — 99285 EMERGENCY DEPT VISIT HI MDM: CPT | Mod: 25

## 2022-03-08 PROCEDURE — G0378 HOSPITAL OBSERVATION PER HR: HCPCS

## 2022-03-08 PROCEDURE — 82945 GLUCOSE OTHER FLUID: CPT | Performed by: EMERGENCY MEDICINE

## 2022-03-08 PROCEDURE — 84157 ASSAY OF PROTEIN OTHER: CPT | Performed by: EMERGENCY MEDICINE

## 2022-03-08 PROCEDURE — 120N000001 HC R&B MED SURG/OB

## 2022-03-08 PROCEDURE — 96374 THER/PROPH/DIAG INJ IV PUSH: CPT | Mod: 59

## 2022-03-08 PROCEDURE — 36415 COLL VENOUS BLD VENIPUNCTURE: CPT | Performed by: EMERGENCY MEDICINE

## 2022-03-08 PROCEDURE — 99220 PR INITIAL OBSERVATION CARE,LEVEL III: CPT | Performed by: INTERNAL MEDICINE

## 2022-03-08 PROCEDURE — 87205 SMEAR GRAM STAIN: CPT | Performed by: EMERGENCY MEDICINE

## 2022-03-08 PROCEDURE — 85652 RBC SED RATE AUTOMATED: CPT | Performed by: EMERGENCY MEDICINE

## 2022-03-08 PROCEDURE — 99207 PR CDG-CODE CATEGORY CHANGED: CPT | Performed by: INTERNAL MEDICINE

## 2022-03-08 PROCEDURE — 86140 C-REACTIVE PROTEIN: CPT | Performed by: EMERGENCY MEDICINE

## 2022-03-08 PROCEDURE — C9803 HOPD COVID-19 SPEC COLLECT: HCPCS

## 2022-03-08 PROCEDURE — 82550 ASSAY OF CK (CPK): CPT | Performed by: EMERGENCY MEDICINE

## 2022-03-08 PROCEDURE — 89050 BODY FLUID CELL COUNT: CPT | Performed by: EMERGENCY MEDICINE

## 2022-03-08 RX ORDER — HYDRALAZINE HYDROCHLORIDE 20 MG/ML
10 INJECTION INTRAMUSCULAR; INTRAVENOUS EVERY 4 HOURS PRN
Status: DISCONTINUED | OUTPATIENT
Start: 2022-03-08 | End: 2022-03-11 | Stop reason: HOSPADM

## 2022-03-08 RX ORDER — PROCHLORPERAZINE MALEATE 10 MG
10 TABLET ORAL EVERY 6 HOURS PRN
Status: DISCONTINUED | OUTPATIENT
Start: 2022-03-08 | End: 2022-03-11 | Stop reason: HOSPADM

## 2022-03-08 RX ORDER — MORPHINE SULFATE 4 MG/ML
4 INJECTION, SOLUTION INTRAMUSCULAR; INTRAVENOUS ONCE
Status: COMPLETED | OUTPATIENT
Start: 2022-03-08 | End: 2022-03-08

## 2022-03-08 RX ORDER — GABAPENTIN 300 MG/1
300 CAPSULE ORAL 3 TIMES DAILY
Status: DISCONTINUED | OUTPATIENT
Start: 2022-03-09 | End: 2022-03-11 | Stop reason: HOSPADM

## 2022-03-08 RX ORDER — LISINOPRIL 20 MG/1
20 TABLET ORAL DAILY
Status: DISCONTINUED | OUTPATIENT
Start: 2022-03-09 | End: 2022-03-11 | Stop reason: HOSPADM

## 2022-03-08 RX ORDER — ONDANSETRON 2 MG/ML
4 INJECTION INTRAMUSCULAR; INTRAVENOUS EVERY 6 HOURS PRN
Status: DISCONTINUED | OUTPATIENT
Start: 2022-03-08 | End: 2022-03-11 | Stop reason: HOSPADM

## 2022-03-08 RX ORDER — LIDOCAINE 40 MG/G
CREAM TOPICAL
Status: DISCONTINUED | OUTPATIENT
Start: 2022-03-08 | End: 2022-03-11 | Stop reason: HOSPADM

## 2022-03-08 RX ORDER — ONDANSETRON 4 MG/1
4 TABLET, ORALLY DISINTEGRATING ORAL EVERY 6 HOURS PRN
Status: DISCONTINUED | OUTPATIENT
Start: 2022-03-08 | End: 2022-03-11 | Stop reason: HOSPADM

## 2022-03-08 RX ORDER — AMOXICILLIN 250 MG
1 CAPSULE ORAL 2 TIMES DAILY PRN
Status: DISCONTINUED | OUTPATIENT
Start: 2022-03-08 | End: 2022-03-11 | Stop reason: HOSPADM

## 2022-03-08 RX ORDER — IBUPROFEN 200 MG
400 TABLET ORAL EVERY 6 HOURS PRN
Status: DISCONTINUED | OUTPATIENT
Start: 2022-03-08 | End: 2022-03-11 | Stop reason: HOSPADM

## 2022-03-08 RX ORDER — AMOXICILLIN 250 MG
2 CAPSULE ORAL 2 TIMES DAILY PRN
Status: DISCONTINUED | OUTPATIENT
Start: 2022-03-08 | End: 2022-03-11 | Stop reason: HOSPADM

## 2022-03-08 RX ORDER — PROCHLORPERAZINE 25 MG
25 SUPPOSITORY, RECTAL RECTAL EVERY 12 HOURS PRN
Status: DISCONTINUED | OUTPATIENT
Start: 2022-03-08 | End: 2022-03-11 | Stop reason: HOSPADM

## 2022-03-08 RX ORDER — IBUPROFEN 200 MG
400 TABLET ORAL EVERY 6 HOURS PRN
Status: ON HOLD | COMMUNITY
End: 2024-05-22

## 2022-03-08 RX ADMIN — MORPHINE SULFATE 4 MG: 4 INJECTION, SOLUTION INTRAMUSCULAR; INTRAVENOUS at 20:45

## 2022-03-08 ASSESSMENT — ACTIVITIES OF DAILY LIVING (ADL): ADLS_ACUITY_SCORE: 7

## 2022-03-08 ASSESSMENT — ENCOUNTER SYMPTOMS
COUGH: 1
RHINORRHEA: 1
ACTIVITY CHANGE: 1
NUMBNESS: 1
WEAKNESS: 1

## 2022-03-09 ENCOUNTER — APPOINTMENT (OUTPATIENT)
Dept: OCCUPATIONAL THERAPY | Facility: CLINIC | Age: 57
End: 2022-03-09
Attending: INTERNAL MEDICINE
Payer: COMMERCIAL

## 2022-03-09 ENCOUNTER — APPOINTMENT (OUTPATIENT)
Dept: PHYSICAL THERAPY | Facility: CLINIC | Age: 57
End: 2022-03-09
Attending: INTERNAL MEDICINE
Payer: COMMERCIAL

## 2022-03-09 LAB
ANION GAP SERPL CALCULATED.3IONS-SCNC: 6 MMOL/L (ref 3–14)
BUN SERPL-MCNC: 24 MG/DL (ref 7–30)
CALCIUM SERPL-MCNC: 9.1 MG/DL (ref 8.5–10.1)
CHLORIDE BLD-SCNC: 105 MMOL/L (ref 94–109)
CO2 SERPL-SCNC: 27 MMOL/L (ref 20–32)
CREAT SERPL-MCNC: 0.85 MG/DL (ref 0.52–1.04)
ERYTHROCYTE [DISTWIDTH] IN BLOOD BY AUTOMATED COUNT: 13.4 % (ref 10–15)
GFR SERPL CREATININE-BSD FRML MDRD: 80 ML/MIN/1.73M2
GLUCOSE BLD-MCNC: 119 MG/DL (ref 70–99)
HCT VFR BLD AUTO: 38.3 % (ref 35–47)
HGB BLD-MCNC: 12.3 G/DL (ref 11.7–15.7)
MCH RBC QN AUTO: 27 PG (ref 26.5–33)
MCHC RBC AUTO-ENTMCNC: 32.1 G/DL (ref 31.5–36.5)
MCV RBC AUTO: 84 FL (ref 78–100)
PLATELET # BLD AUTO: 329 10E3/UL (ref 150–450)
POTASSIUM BLD-SCNC: 4 MMOL/L (ref 3.4–5.3)
RBC # BLD AUTO: 4.56 10E6/UL (ref 3.8–5.2)
SODIUM SERPL-SCNC: 138 MMOL/L (ref 133–144)
TSH SERPL DL<=0.005 MIU/L-ACNC: 1.94 MU/L (ref 0.4–4)
WBC # BLD AUTO: 9.7 10E3/UL (ref 4–11)

## 2022-03-09 PROCEDURE — 97110 THERAPEUTIC EXERCISES: CPT | Mod: GO | Performed by: OCCUPATIONAL THERAPIST

## 2022-03-09 PROCEDURE — 86618 LYME DISEASE ANTIBODY: CPT | Performed by: PSYCHIATRY & NEUROLOGY

## 2022-03-09 PROCEDURE — 120N000001 HC R&B MED SURG/OB

## 2022-03-09 PROCEDURE — 82746 ASSAY OF FOLIC ACID SERUM: CPT | Performed by: HOSPITALIST

## 2022-03-09 PROCEDURE — 36415 COLL VENOUS BLD VENIPUNCTURE: CPT | Performed by: INTERNAL MEDICINE

## 2022-03-09 PROCEDURE — 96375 TX/PRO/DX INJ NEW DRUG ADDON: CPT

## 2022-03-09 PROCEDURE — 82607 VITAMIN B-12: CPT | Performed by: HOSPITALIST

## 2022-03-09 PROCEDURE — 83921 ORGANIC ACID SINGLE QUANT: CPT | Performed by: HOSPITALIST

## 2022-03-09 PROCEDURE — 250N000011 HC RX IP 250 OP 636: Performed by: INTERNAL MEDICINE

## 2022-03-09 PROCEDURE — 84443 ASSAY THYROID STIM HORMONE: CPT | Performed by: HOSPITALIST

## 2022-03-09 PROCEDURE — 84446 ASSAY OF VITAMIN E: CPT | Performed by: PSYCHIATRY & NEUROLOGY

## 2022-03-09 PROCEDURE — 97116 GAIT TRAINING THERAPY: CPT | Mod: GP

## 2022-03-09 PROCEDURE — 97535 SELF CARE MNGMENT TRAINING: CPT | Mod: GO | Performed by: OCCUPATIONAL THERAPIST

## 2022-03-09 PROCEDURE — 86334 IMMUNOFIX E-PHORESIS SERUM: CPT | Performed by: PATHOLOGY

## 2022-03-09 PROCEDURE — 84207 ASSAY OF VITAMIN B-6: CPT | Performed by: PSYCHIATRY & NEUROLOGY

## 2022-03-09 PROCEDURE — 97162 PT EVAL MOD COMPLEX 30 MIN: CPT | Mod: GP

## 2022-03-09 PROCEDURE — 36415 COLL VENOUS BLD VENIPUNCTURE: CPT | Performed by: HOSPITALIST

## 2022-03-09 PROCEDURE — 80048 BASIC METABOLIC PNL TOTAL CA: CPT | Performed by: INTERNAL MEDICINE

## 2022-03-09 PROCEDURE — 97166 OT EVAL MOD COMPLEX 45 MIN: CPT | Mod: GO | Performed by: OCCUPATIONAL THERAPIST

## 2022-03-09 PROCEDURE — 86255 FLUORESCENT ANTIBODY SCREEN: CPT | Performed by: PSYCHIATRY & NEUROLOGY

## 2022-03-09 PROCEDURE — 99225 PR SUBSEQUENT OBSERVATION CARE,LEVEL II: CPT | Performed by: HOSPITALIST

## 2022-03-09 PROCEDURE — G0378 HOSPITAL OBSERVATION PER HR: HCPCS

## 2022-03-09 PROCEDURE — 97530 THERAPEUTIC ACTIVITIES: CPT | Mod: GP

## 2022-03-09 PROCEDURE — 85027 COMPLETE CBC AUTOMATED: CPT | Performed by: INTERNAL MEDICINE

## 2022-03-09 PROCEDURE — 99222 1ST HOSP IP/OBS MODERATE 55: CPT | Performed by: NURSE PRACTITIONER

## 2022-03-09 PROCEDURE — 36415 COLL VENOUS BLD VENIPUNCTURE: CPT | Performed by: PSYCHIATRY & NEUROLOGY

## 2022-03-09 PROCEDURE — 250N000013 HC RX MED GY IP 250 OP 250 PS 637: Performed by: INTERNAL MEDICINE

## 2022-03-09 RX ADMIN — IBUPROFEN 400 MG: 200 TABLET, FILM COATED ORAL at 10:48

## 2022-03-09 RX ADMIN — GABAPENTIN 300 MG: 300 CAPSULE ORAL at 21:23

## 2022-03-09 RX ADMIN — HYDRALAZINE HYDROCHLORIDE 10 MG: 20 INJECTION INTRAMUSCULAR; INTRAVENOUS at 00:56

## 2022-03-09 RX ADMIN — GABAPENTIN 300 MG: 300 CAPSULE ORAL at 00:55

## 2022-03-09 RX ADMIN — GABAPENTIN 300 MG: 300 CAPSULE ORAL at 08:32

## 2022-03-09 RX ADMIN — GABAPENTIN 300 MG: 300 CAPSULE ORAL at 16:09

## 2022-03-09 RX ADMIN — LISINOPRIL 20 MG: 20 TABLET ORAL at 08:32

## 2022-03-09 RX ADMIN — IBUPROFEN 400 MG: 200 TABLET, FILM COATED ORAL at 21:28

## 2022-03-09 ASSESSMENT — ACTIVITIES OF DAILY LIVING (ADL)
ADLS_ACUITY_SCORE: 7
ADLS_ACUITY_SCORE: 11
ADLS_ACUITY_SCORE: 7
ADLS_ACUITY_SCORE: 9
ADLS_ACUITY_SCORE: 11
ADLS_ACUITY_SCORE: 11
ADLS_ACUITY_SCORE: 7
ADLS_ACUITY_SCORE: 9
ADLS_ACUITY_SCORE: 11
ADLS_ACUITY_SCORE: 9
ADLS_ACUITY_SCORE: 7
ADLS_ACUITY_SCORE: 9
ADLS_ACUITY_SCORE: 7
ADLS_ACUITY_SCORE: 7
ADLS_ACUITY_SCORE: 11
ADLS_ACUITY_SCORE: 9
ADLS_ACUITY_SCORE: 9
ADLS_ACUITY_SCORE: 7
ADLS_ACUITY_SCORE: 9
ADLS_ACUITY_SCORE: 7
ADLS_ACUITY_SCORE: 9
ADLS_ACUITY_SCORE: 11
ADLS_ACUITY_SCORE: 9
ADLS_ACUITY_SCORE: 7
PREVIOUS_RESPONSIBILITIES: MEAL PREP;HOUSEKEEPING;LAUNDRY;SHOPPING;MEDICATION MANAGEMENT;FINANCES;DRIVING;WORK

## 2022-03-09 NOTE — H&P
Admitted: 03/08/2022    CHIEF COMPLAINT:  Largely low back pain and hand numbness.    HISTORY OF PRESENT ILLNESS:  This history is obtained from the patient herself along with her daughter at the bedside.  Ms. Jimmy Stevens is a 56-year-old female who has now been seen in the emergency room four times in the last month without a significant past medical history other than a fairly new diagnosis of hypertension, as well as a recovered COVID in January of this year.      Approximately one month ago, the patient began feeling numbness in her hands to the point where I would touch her fingers and her thumb together and would not feel anything.  This progressed to the point where she felt like she was extra clumsy.  She works at a bank and often counts money and she was having difficulties doing that.  Curiously, several days after that, she also developed numbness in her feet, which at times is actually admitted even difficult for her to walk.  She has had extensive neurologic evaluation including multiple imaging studies including her spine, as well as her brain.  The brain MRIs have been largely normal.  She has had various abnormalities, which will be clarified below in her spinal MRIs and she has also had a grossly abnormal EMG.      The patient was actually seen in neurology clinic today and seen by Dr. Shafer and given her ongoing symptoms, it was recommended she come back to the emergency room today for a lumbar puncture or concern of Guillain-[place] syndrome.  The patient was evaluated in the emergency room by Dr. Siddiqui.  Her evaluation was largely normal from a laboratory perspective and an LP was obtained, which was also largely normal with a glucose of 68 and a protein of 42.  Given lack of diagnosis and failure of outpatient management, admission was requested for further observation and management.    At the time I interviewed, the patient does report ongoing numbness in her hands and feet.  She does  report lower back pain, which started around the same time.    The patient does also report that she has had some significant life stressors in the past several years.  Notably, her father passed away about 5 years ago and then her  passed away approximately a year later.  Subsequent to that, she moved in with her daughter.  Also in the last couple of years, her mother was diagnosed with a stroke.  Moreover, this past year, her daughter was diagnosed with cervical cancer and required a hysterectomy, which had various complications.  This did cause a significant amount of stress.  On top of that, her daughter's  whom they lived with left her daughter.    The patient reports that her COVID illness with relatively mild.  She mainly felt tired with loss of appetite.  She was not treated at all.  Notably, she did have two doses of the vaccine, last year.  Really her only other complaint is that she has a poor appetite, maybe mild nausea, but no vomiting.  He has had no shortness of breath, chest pain.  No visual disturbances.  She reports that her job is not stressful and she has worked there for many, many years.    REVIEW OF SYSTEMS:  A 10-point review of systems conducted and is negative except as noted above in history of present illness.    PAST MEDICAL HISTORY:      1.  Recent diagnosis of hypertension.  2.  Recovered COVID.  3.  No known history of seizures, diabetes, coronary artery disease or stroke.    FAMILY HISTORY:  Reviewed and noncontributory.    SOCIAL HISTORY:  The patient is a lifetime nonsmoker.  No alcohol use.  No recreational drug use.  She lives currently independently with her daughter.    MEDICATIONS:  [id number]:  1.  Lisinopril.  2.  P.r.n. ibuprofen.  3.  Gabapentin.    PHYSICAL EXAMINATION:    VITAL SIGNS:  Blood pressure 153/105, temperature 96.9, heart rate is 104, respirations 22.  She is satting at 99% on room air.  GENERAL:  This is a well-nourished female.  She is  anxious and verbose, but otherwise not in obvious pain or respiratory distress.  HEENT:  Pupils are round and reactive.  Tongue and uvula are midline.  NEUROLOGIC:  She has weakened, but equal  strength.  There is no vertical or horizontal high statin nystagmus.  Patellar reflexes are diminished bilaterally.  Plantar flexion is weak on both sides.    MUSCULOSKELETAL:  She is able to lift both legs, 3/5 strength.  Jimenez sign is absent.    CHEST:  Lungs were clear to auscultation bilaterally.  Heart Rhythm is regular.  S1, S2 present.  ABDOMEN:  Soft, nontender, nondistended.  SKIN:  No obvious skin rashes or lesions.    LABORATORY DATA:  Sodium 139, potassium 4.2, BUN 25, creatinine 1.04, ALT 22, AST 7.  CK 33.  CRP 9.3.  White blood cell count 9.6, hemoglobin 12.8, platelets are 340.  Sed rate slightly elevated at 37.  Lumbar puncture shows one red blood cell and one white blood cell.  glucose 68 and protein 42 from tub #4.    IMAGING:  Many of these studies were performed before today.  1.  MRI of the brain on 02/13/2022, impression:  No evidence of acute infarction or other acute intracranial abnormality.  Mild scattered T2 hyperdense white matter signal abnormalities are nonspecific, reflect chronic small vessel ischemic change, chronic migraines.  The quality and demyelination.  Could exhibited similar experience empty sella configuration.  Finding is most often incidental.    2.  EMG performed on 02/22/2022, conclusion:  Axonal sensorimotor polyneuropathy.  Superimposed very severe left median neuropathy across the wrist with markedly prolonged latency and probably conduction block, given reduced amplitude.  No evidence of cervical or lumbosacral radiculopathy use.    3.  MRI of the lumbar spine read on 02/28/2022, impression:  Mild dextroconvex lumbar spinal curvature with minimal retrolisthesis from L2-L3 to L4-L5.  Mild multilevel lumbar spondylosis as outlined without central canal stenosis in the  lumbar spine.  Mild right L5-S1 inferior foraminal stenosis from inferior foraminal disk herniation without deformity of the exiting right L5 nerve root.  Additional borderline L4-5 and L3-L4 inferior foraminal stenosis bilaterally.    4.  MRI of the thoracic spine, this was performed on 02/28/2022, impression:  Normal cervical and upper thoracic spinal cord.  Diffuse idiopathic skeletal hyperostosis from C7 to upper T3 multilevel cervical spondylosis, as outlined, resulting in moderate central canal stenosis at C5-C6 and C6-C7 with effacement of the intrathecal CSF, but no active cord compression or deformity.  Patent neural foramina in the cervical spine.    ASSESSMENT AND PLAN:  This is a 56-year-old female who has undergone extensive evaluation for various neurologic complaints, seeming to start rather acutely approximately 1 month ago.  The differential here is quite large.  She was sent for evaluation for Guillain-[place]; however, this appears to be less likely.  Her CSF was largely normal with a normal protein.  She does have several abnormalities seen on thoracic and lumbar spine imaging.  The upper imaging abnormalities could explain her hand numbness and the lower findings also could explain her feet numbness.  Notably, there is some mention also in the brain MRI of hyperintense white matter signal abnormalities, so consider the possibility of a demyelination disorder.  Also, consider the possibility that this represents a sequelae of long haul COVID symptoms.  At this point, I would not discount the possibility of a conversion reaction given her recent family stressors; however, this would not explain the severely abnormal EMG.    Serum CK is normal and no skin lesions suggests against myositis/dermatomyositis.    She will be admitted as an inpatient.  given her failure of outpatient management.  Consulted neurology and Neurosurgery.    She will be seen by physical and occupational therapy.  I suspect  ongoing therapy will be needed.    Hypertension.  This appears to be a new diagnosis.  She was started on lisinopril in the last couple of months.  She is significantly hypertensive here, but she did not take her lisinopril today.  This will be restarted with p.r.n. hydralazine available.    DVT prophylaxis.  The patient is low risk.  Encourage ambulation.      Total time spent greater than 50% of which involved counseling and coordination of care is 75 minutes including counseling of the patient.    Puneet Reid MD        D: 2022   T: 2022   MT: [id number]    Name:     VERNELL ROJO  MRN:      [id number]        Account:     [id number]   :      1965           Admitted:    2022       Document: [id number]

## 2022-03-09 NOTE — ED NOTES
[facility]  ED Nurse Handoff Report    ED Chief complaint: Numbness (Patient having numbness in bilateral toes/feet and hands. Patient has been seen in ED multiple times for same. Sent here by Deb Shafer (Neurology) for evaluation.)      ED Diagnosis:   Final diagnoses:   None       Code Status: admitting  Physician to determine    Allergies:   Allergies   Allergen Reactions     H2 Antagonists      Mylan (itching)     Minocycline        Patient Story: Patient having numbness in bilateral toes/feet and hands. Patient has been seen in ED multiple times for same. Sent here by Deb Shafer (Neurology) for evaluation.    Focused Assessment:  Labs Ordered and Resulted from Time of ED Arrival to Time of ED Departure   CBC WITH PLATELETS AND DIFFERENTIAL - Abnormal       Result Value    WBC Count 9.6      RBC Count 4.74      Hemoglobin 12.8      Hematocrit 41.2      MCV 87      MCH 27.0      MCHC 31.1 (*)     RDW 13.3      Platelet Count 340      % Neutrophils 64      % Lymphocytes 27      % Monocytes 7      % Eosinophils 1      % Basophils 1      % Immature Granulocytes 0      NRBCs per 100 WBC 0      Absolute Neutrophils 6.2      Absolute Lymphocytes 2.6      Absolute Monocytes 0.6      Absolute Eosinophils 0.1      Absolute Basophils 0.1      Absolute Immature Granulocytes 0.0      Absolute NRBCs 0.0     COMPREHENSIVE METABOLIC PANEL   GLUCOSE CSF   PROTEIN TOTAL CSF   GRAM STAIN   AEROBIC BACTERIAL CULTURE ROUTINE   CELL COUNT WITH DIFFERENTIAL CSF     No orders to display         Treatments and/or interventions provided: VSS, A&Ox4   Patient's response to treatments and/or interventions:     To be done/followed up on inpatient unit:     Does this patient have any cognitive concerns?: NA    Activity level - Baseline/Home:  Independent  Activity Level - Current:   Independent    Patient's Preferred language: English   Needed?: No    Isolation: None  Infection: Not Applicable  Patient tested for  "COVID 19 prior to admission: YES  Bariatric?: No    Vital Signs:   Vitals:    03/08/22 1743   BP: (!) 153/105   Pulse: 104   Resp: 22   Temp: 96.9  F (36.1  C)   TempSrc: Temporal   SpO2: 99%   Weight: 83.9 kg (185 lb)   Height: 1.626 m (5' 4\")       Cardiac Rhythm:     Was the PSS-3 completed:   Yes  What interventions are required if any?                 For the majority of the shift this patient's behavior was Green.   Behavioral interventions performed were NA.    ED NURSE PHONE NUMBER: *[phone]         "

## 2022-03-09 NOTE — PROGRESS NOTES
RECEIVING UNIT ED HANDOFF REVIEW    ED Nurse Handoff Report was reviewed by: Dannielle Garcia RN on March 8, 2022 at 11:05 PM

## 2022-03-09 NOTE — CONSULTS
[facility]    Neurology Consultation     Date of Admission:  3/8/2022    Assessment & Plan   Jimmy Stevens is a 56 year old female who was admitted on 3/8/2022. I was asked to see the patient for multiple neurological complaints, abnormal EMG.  The patient has multiple complaints, neurological exam shows some weakness in both hands which might be related to her neuropathy and carpal tunnel.  The patient does not really have any radicular symptoms from the neck.  She does have significant neck disease.  She has had also a lot of low back pain and radiating pain in the left lower extremity, no findings of radiculopathy.  The patient is significantly depressed.  I would recommend the following:    - We will continue the work-up for treatable causes of neuropathy  - Neurosurgery saw the patient they are looking at the films however I do not think the patient will benefit from a surgery at this point with the exception of probable carpal tunnel surgery on the left.  Will appreciate neurosurgery input after reviewing the MRI films  -Physical therapy, Occupational Therapy to evaluate the patient.  I think the patient will benefit from rehab with intensified physical therapy and Occupational Therapy  -I would consider psychiatry evaluation and management of significant depression and stressors in the family.      Ade Ford MD    Code Status    Full Code    Reason for Consult   Reason for consult: I was asked by Dr Reid to evaluate this patient for multiple neurological complaints, abnormal EMG    Primary Care Physician   Nick Calderon    Chief Complaint   Numbness, LBP    History is obtained from the patient and electronic health record    History of Present Illness   Jimmy Stevens is a 56 year old female who presents with multiple neurological symptoms.  Of note the patient has had Covid in early January.  Covid illness was mild she just felt tired and lost her  appetite.  She was not admitted to the hospital and no respiratory symptoms.  She has had the 2 vaccinations.     The patient states that she started to have numbness in her feet last year which started to progress, now she noticed numbness in both hands as well as weakness.  The numbness in the hands started about a month ago.  The patient started to become quite clumsy, the numbness in the feet worsened and the patient continues to have difficulty with ambulating.    The patient also states that she has had a lot of low back pain.  The pain is in the left low back as well as left flank and at times will go down the left leg.  When the pain will flareup the pain will be very severe and burning.    The patient has been seen in neurology clinic yesterday by Dr. Shafer who noticed decreasing reflexes and weakness and the patient was referred to emergency room to have a lumbar puncture to exclude the possibility of Guillain-Barré.  Lumbar puncture in emergency room Yesterday shows a white count of 1 red blood cell of 1, protein in the CSF 42, glucose normal, CSF Gram stain was negative.    The patient has had an EMG about 2 weeks ago which showed severe axonal peripheral neuropathy as well as left carpal tunnel syndrome quite significant with no evidence for radiculopathy.    MRI lumbar spine shows   Mild multilevel lumbar spondylosis as outlined, without central canal stenosis in the lumbar spine.   Mild right L5-S1 inferior foraminal stenosis from inferior foraminal disc herniation, without deformity of the exiting right L5 nerve root. Additional borderline L4-5 and L3-4 inferior foraminal stenosis bilaterally.    MRI of the thoracic spine was normal    MRI of the cervical spine shows  Normal cervical and upper thoracic spinal cord.   Diffuse idiopathic skeletal hyperostosis from C7 to upper T3 Multilevel cervical spondylosis as outlined. Resulting moderate central canal stenosis at C5-6 and C6-7 with effacement of  intrathecal CSF but no active cord compression or deformity. Additional mild central canal stenosis at C3-4, C4-5, and C7-T1. Patent neural foramina in the cervical spine.     Of note the patient has been under significant stressors for the last few years.  Her father passed away a few years ago, a year ago patient's  passed away.  The patient lives with her daughter who recently  after 10 years of marriage.  Patient's mother has had a stroke.  There is sister who has had some problems.  Patient's daughter was diagnosed with cervical cancer and has had a hysterectomy.  The patient is quite involved with her family and apparently has been quite anxious and depressed of these happenings.  At home the patient states she is not able to cook anymore.  Patient's daughter is doing all the housekeeping.  The patient states that she has had problems working that the [employer].  The patient admits for feeling depressed.    Past Medical History   I have reviewed this patient's medical history and updated it with pertinent information if needed.   Past Medical History:   Diagnosis Date     Acute stress reaction 9/9/2009     Essential hypertension, benign 3/15/2016     Hyperlipidemia LDL goal <160 10/31/2010     Iron deficiency anemia, unspecified      Knee pain 9/9/2009       Past Surgical History   I have reviewed this patient's surgical history and updated it with pertinent information if needed.  Past Surgical History:   Procedure Laterality Date     [facility] REMOVAL OF OVARY/TUBE(S)  3/99    Salpingo-Oophorectomy, Unilateral     HYSTERECTOMY, PAP NO LONGER INDICATED  3/99     [facility] NONSPECIFIC PROCEDURE  10/29/99    MRI brain normal     [facility] NONSPECIFIC PROCEDURE  02/00    B breast reduction surgery     [facility] TOTAL ABDOM HYSTERECTOMY  3/99    Hysterectomy, Total Abdominal       Prior to Admission Medications   Prior to Admission Medications   Prescriptions Last Dose Informant Patient Reported? Taking?   Acetaminophen  (TYLENOL PO)   Yes No   Sig: Take by mouth every 6 hours as needed for mild pain or fever   gabapentin (NEURONTIN) 300 MG capsule 3/7/2022 at Unknown time  No Yes   Sig: Take 1 capsule (300 mg) by mouth 3 times daily   ibuprofen (ADVIL/MOTRIN) 200 MG tablet 3/8/2022 at 1100  Yes Yes   Sig: Take 400 mg by mouth every 6 hours as needed for mild pain   lisinopril (ZESTRIL) 20 MG tablet   No No   Sig: TAKE 1 TABLET BY MOUTH TWICE A DAY      Facility-Administered Medications: None     Allergies   Allergies   Allergen Reactions     H2 Antagonists      Mylan (itching)     Minocycline        Social History   I have reviewed this patient's social history and updated it with pertinent information if needed. Jimmy Stevens  reports that she has never smoked. She has never used smokeless tobacco. She reports that she does not drink alcohol and does not use drugs.    Family History   I have reviewed this patient's family history and updated it with pertinent information if needed.   Family History   Problem Relation Age of Onset     Genitourinary Problems Mother         B:1942 Alive     Family History Negative Father         B:1940 Alive     Family History Negative Sister         5 sisters all healthy     Family History Negative Brother         1 brother healthy       Review of Systems   The 10 point Review of Systems is negative other than noted in the HPI or here.     Physical Exam   Temp: 98.3  F (36.8  C) Temp src: Oral BP: (!) 142/95 Pulse: 102   Resp: 16 SpO2: 95 % O2 Device: None (Room air)    Vital Signs with Ranges  Temp:  [96.9  F (36.1  C)-100  F (37.8  C)] 98.3  F (36.8  C)  Pulse:  [] 102  Resp:  [13-28] 16  BP: (124-176)/() 142/95  SpO2:  [95 %-100 %] 95 %  185 lbs 0 oz    Constitutional: Normal  Eyes: No conjunctival erythema  ENT: Neck is supple  Respiratory: CTA  Cardiovascular: RRR  Skin: No obvious lesions  Musculoskeletal: Mild pedal edema  The patient is alert oriented x3 no acute distress.   Speech is fluent there is no aphasia apraxia or agnosia.  Attention and memory are normal.  Pupils are equal round reactive to light extraocular movements are intact, there is no nystagmus.  Facial muscles are equal bilaterally.  Uvula and tongue are midline.      Muscular mass tone are normal in all 4 extremities.  There is weakness in small muscles of both hands in both ulnar and median distribution.  Wrist flexion and extension are about 4 out of 5.  There is no pronator drift.  Reflexes are present symmetric in upper and lower extremities with 0 ankle jerks..  Babinski is absent.    Sensory exam shows decreased light touch in both lower extremities.  Vibratory sense was also decreased.  Finger-nose-finger without dysmetria.  Fine movements are normal.    Gait was deferred the patient was able to ambulate with the help of a walker.  Neuropsychiatric:  depressed    Data   Results for orders placed or performed during the hospital encounter of 03/08/22 (from the past 24 hour(s))   EKG 12-lead, tracing only   Result Value Ref Range    Systolic Blood Pressure  mmHg    Diastolic Blood Pressure  mmHg    Ventricular Rate 101 BPM    Atrial Rate 101 BPM    MS Interval 152 ms    QRS Duration 84 ms     ms    QTc 461 ms    P Axis 53 degrees    R AXIS 1 degrees    T Axis 8 degrees    Interpretation ECG       Sinus tachycardia  Possible Left atrial enlargement  Nonspecific T wave abnormality  Abnormal ECG  When compared with ECG of 13-FEB-2022 16:40,  No significant change was found  Confirmed by GENERATED REPORT, COMPUTER ([id number]),  Jazzy Urbina ([id number]) on 3/8/2022 9:16:56 PM     CBC with platelets differential    Narrative    The following orders were created for panel order CBC with platelets differential.  Procedure                               Abnormality         Status                     ---------                               -----------         ------                     CBC with platelets and  rosalva..[[id number]]  Abnormal            Final result                 Please view results for these tests on the individual orders.   Comprehensive metabolic panel   Result Value Ref Range    Sodium 139 133 - 144 mmol/L    Potassium 4.2 3.4 - 5.3 mmol/L    Chloride 107 94 - 109 mmol/L    Carbon Dioxide (CO2) 27 20 - 32 mmol/L    Anion Gap 5 3 - 14 mmol/L    Urea Nitrogen 25 7 - 30 mg/dL    Creatinine 1.04 0.52 - 1.04 mg/dL    Calcium 9.3 8.5 - 10.1 mg/dL    Glucose 120 (H) 70 - 99 mg/dL    Alkaline Phosphatase 59 40 - 150 U/L    AST 7 0 - 45 U/L    ALT 22 0 - 50 U/L    Protein Total 7.7 6.8 - 8.8 g/dL    Albumin 3.5 3.4 - 5.0 g/dL    Bilirubin Total 0.3 0.2 - 1.3 mg/dL    GFR Estimate 63 >60 mL/min/1.73m2   CBC with platelets and differential   Result Value Ref Range    WBC Count 9.6 4.0 - 11.0 10e3/uL    RBC Count 4.74 3.80 - 5.20 10e6/uL    Hemoglobin 12.8 11.7 - 15.7 g/dL    Hematocrit 41.2 35.0 - 47.0 %    MCV 87 78 - 100 fL    MCH 27.0 26.5 - 33.0 pg    MCHC 31.1 (L) 31.5 - 36.5 g/dL    RDW 13.3 10.0 - 15.0 %    Platelet Count 340 150 - 450 10e3/uL    % Neutrophils 64 %    % Lymphocytes 27 %    % Monocytes 7 %    % Eosinophils 1 %    % Basophils 1 %    % Immature Granulocytes 0 %    NRBCs per 100 WBC 0 <1 /100    Absolute Neutrophils 6.2 1.6 - 8.3 10e3/uL    Absolute Lymphocytes 2.6 0.8 - 5.3 10e3/uL    Absolute Monocytes 0.6 0.0 - 1.3 10e3/uL    Absolute Eosinophils 0.1 0.0 - 0.7 10e3/uL    Absolute Basophils 0.1 0.0 - 0.2 10e3/uL    Absolute Immature Granulocytes 0.0 <=0.4 10e3/uL    Absolute NRBCs 0.0 10e3/uL   CK total   Result Value Ref Range    CK 33 30 - 225 U/L   Erythrocyte sedimentation rate auto   Result Value Ref Range    Erythrocyte Sedimentation Rate 37 (H) 0 - 30 mm/hr   CRP inflammation   Result Value Ref Range    CRP Inflammation 9.3 (H) 0.0 - 8.0 mg/L   Glucose CSF:   Result Value Ref Range    Glucose CSF 68 40 - 70 mg/dL    Narrative    CSF glucose concentrations are about 60 percent of normal  plasma glucose.  CSF glucose concentrations are about 60 percent of normal plasma glucose.   Protein total CSF:   Result Value Ref Range    Protein total CSF 42 15 - 60 mg/dL    Narrative    This is a lab developed test. It has not been cleared or approved by the FDA.   Cerebrospinal fluid Aerobic Bacterial Culture Routine    Specimen: Lumbar Puncture; Cerebrospinal fluid   Result Value Ref Range    Gram Stain Result No organisms seen     Gram Stain Result 1+ WBC seen     Narrative    Gram Stain quantification of host cells and microbiological organisms was done on a cytocentrifuged preparation.     CSF Cell Count with Differential:    Narrative    The following orders were created for panel order CSF Cell Count with Differential:.  Procedure                               Abnormality         Status                     ---------                               -----------         ------                     Cell Count CSF[[id number]]                                   Final result                 Please view results for these tests on the individual orders.   Cell Count CSF   Result Value Ref Range    Tube Number 4     Color Colorless Colorless    Clarity Clear Clear    Total Nucleated Cells 1 0 - 5 /uL    RBC Count 1 0 - 2 /uL   -Lumbar Puncture    Narrative    Jazmín Iglesias MD     3/8/2022 11:06 PM  [facility]    -Lumbar Puncture    Date/Time: 3/8/2022 10:50 PM  Performed by: Jazmín Iglesias MD  Authorized by: Jazmín Iglesias MD     Risks, benefits and alternatives discussed.      UNIVERSAL PROTOCOL   Site Marked: NA  Prior Images Obtained and Reviewed:  NA  Required items: Required blood products, implants, devices and special   equipment available    Patient identity confirmed:  Verbally with patient  NA - No sedation, light sedation, or local anesthesia  Confirmation Checklist:  Patient's identity using two indicators  Time out: Immediately prior to the procedure a  time out was called    Universal Protocol: the Joint Commission Universal Protocol was followed    Preparation: Patient was prepped and draped in usual sterile fashion      PRE-PROCEDURE DETAILS:     Procedure purpose:  Diagnostic    ANESTHESIA (see MAR for exact dosages):     Anesthesia method:  Local infiltration    Local anesthetic:  Lidocaine 1% w/o epi      PROCEDURE DETAILS:     Lumbar space:  L4-L5 interspace    Patient position:  Sitting    Needle gauge:  20    Needle type:  Spinal needle - Quincke tip    Needle length (in):  3.5    Ultrasound guidance: no      Number of attempts:  3    Fluid appearance:  Clear    Tubes of fluid:  4    Total volume (ml):  4    POST-PROCEDURE:     Puncture site:  Adhesive bandage applied        PROCEDURE    Patient Tolerance:  Patient tolerated the procedure well with no immediate   complications   CBC with platelets   Result Value Ref Range    WBC Count 9.7 4.0 - 11.0 10e3/uL    RBC Count 4.56 3.80 - 5.20 10e6/uL    Hemoglobin 12.3 11.7 - 15.7 g/dL    Hematocrit 38.3 35.0 - 47.0 %    MCV 84 78 - 100 fL    MCH 27.0 26.5 - 33.0 pg    MCHC 32.1 31.5 - 36.5 g/dL    RDW 13.4 10.0 - 15.0 %    Platelet Count 329 150 - 450 10e3/uL   EMG shows axonal sensory-motor polyneuropathy.  There is also    Superimposed very severe left median neuropathy across the wrist (CTS),   with markedly prolonged latency and probably conduction block (given   reduced amplitude but with normal motor units).       No evidence of cervical or lumbosacral radiculopathies.

## 2022-03-09 NOTE — PROGRESS NOTES
"[facility]    Medicine Progress Note - Hospitalist Service    Date of Admission:  3/8/2022    Assessment & Plan            Jimmy Stevens is a 56 year old female admitted on 3/8/2022.  Past history of HTN and recent COVID infection 1/2022 who presents with progressive bilateral hand and feet numbness over the past 1 month.      Bilateral paresthesias of hands and feet  Etiology unclear.  Extensive prior work-up including MRI's of brain, lumbar and thoracic spine, EMG (see H&P for details).  EMG abnormal showing axonal sensorimotor polyneuropathy.  Spinal MRI's with areas of spondylosis and moderate central canal stenosis.   * LP at admission wnl  * ESR 37, CRP 9.3    - check TSH, B12 and MMA  - Neurology and neurosurgery consults pending  - note RF in 2015 was mildly elevated but denies any history of RA and denies history of painful swollen joints - will await neuro input before ordering any RF or CAREY  - reported to RN some difficulty with eating some textures; will ask SLP to evaluate     HTN  - continue PTA lisinopril         Diet: Combination Diet Regular Diet Adult    DVT Prophylaxis: Pneumatic Compression Devices  Lau Catheter: Not present  Central Lines: None  Cardiac Monitoring: None  Code Status: Full Code      Disposition Plan   Expected Discharge: 03/10/2022     Anticipated discharge location:  Awaiting care coordination huddle  Delays:            The patient's care was discussed with the Bedside Nurse and Patient.    Juan Busby MD  Hospitalist Service  [facility]  Securely message with the Vocera Web Console (learn more here)  Text page via [employer] Paging/Directory         Clinically Significant Risk Factors Present on Admission                 # Obesity: Estimated body mass index is 31.76 kg/m  as calculated from the following:    Height as of this encounter: 1.626 m (5' 4\").    Weight as of this encounter: 83.9 kg (185 lb).  "     ______________________________________________________________________    Interval History   Reports no change in symptoms.  Denies subjective fever.  Denies any cough, dyspnea, dysuria, rash, nausea, abdominal pain, diarrhea or other complaints.     RN reports patient reported to her some difficulty swallowing certain textures.     Data reviewed today: I reviewed all medications, new labs and imaging results over the last 24 hours. I personally reviewed no images or EKG's today.    Physical Exam   Vital Signs: Temp: 98  F (36.7  C) Temp src: Oral BP: 132/84 Pulse: 110   Resp: 16 SpO2: 97 % O2 Device: None (Room air)    Weight: 185 lbs 0 oz  General Appearance: Well nourished female in NAD  Respiratory: lungs CTAB, no wheezes or crackles, no tachypnea   Cardiovascular: RRR, normal s1/s2 without murmur  GI: abdomen soft, normal bowel sounds  Skin: no peripheral edema   Other: Alert and appropriate, cranial nerves grossly intact      Data   Recent Labs   Lab 03/09/22  0819 03/08/22  1956   WBC 9.7 9.6   HGB 12.3 12.8   MCV 84 87    340    139   POTASSIUM 4.0 4.2   CHLORIDE 105 107   CO2 27 27   BUN 24 25   CR 0.85 1.04   ANIONGAP 6 5   RAI 9.1 9.3   * 120*   ALBUMIN  --  3.5   PROTTOTAL  --  7.7   BILITOTAL  --  0.3   ALKPHOS  --  59   ALT  --  22   AST  --  7

## 2022-03-09 NOTE — PROGRESS NOTES
03/09/22 1451   Quick Adds   Type of Visit Initial PT Evaluation   Living Environment   People in Home child(lazara), adult   Transportation Anticipated family or friend will provide   Living Environment Comments Pt lives in 3rd floor apt, no elevator    Self-Care   Usual Activity Tolerance good   Current Activity Tolerance fair   Equipment Currently Used at Home   (Per pt she uses walking stick)   Fall history within last six months yes   Number of times patient has fallen within last six months 3   Activity/Exercise/Self-Care Comment Pt IND at baseline, works at a [employer]. 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  EMG on 2/22 showed polyneuropathy, superimposed very severe left median neuropathy across the wrist with markedly progressive latency, conduction block.  MRI thoracic spine showed diffuse idiopathic skeletal hyperostosis from C7 to upper T3 with multilevel cervical spondylosis resulting in moderate central canal stenosis at C5-6 and C6/7.  Plan for LP, IVIG versus plasma exchange. She had low grade fevers and tachycardia. LP was done and CSF was normal with normal protein, but several abnormalities on imaging, possibly related to long-haul Covid, but has severely abnormal EMG.  Patient needs further work-up and ongoing interventions by neurology and neurosurgery team, anticipate more than 2 midnight hospital stay, recommend continue inpatient status   Existing  "Precautions/Restrictions fall   Cognition   Orientation Status (Cognition) oriented x 3   Posture    Posture Forward head position   Range of Motion (ROM)   ROM Comment BLE WFL   Strength (Manual Muscle Testing)   Strength Comments Pt demonstrates gross functional weakness in BUE/BLE however demonstrates > 3/5 strength in BUE/BLE. Use of UE push off to successfully stand    Bed Mobility   Comment, (Bed Mobility) SBA    Transfers   Comment, (Transfers) STS with FWW and close CGA, BUE push off    Gait/Stairs (Locomotion)   Comment, (Gait/Stairs) Pt ambulated with FWW, decreased step length, reporting discomfort on the bottom of her ft, no buckling    Balance   Balance Comments impaired dynamic balance from baseline    Sensory Examination   Sensory Perception Comments Reports \"tingling/numbness\" to hands and feet. Same side to side   Clinical Impression   Criteria for Skilled Therapeutic Intervention Yes, treatment indicated   PT Diagnosis (PT) impaired IND with functional mobility    Influenced by the following impairments Functional weakness, impaired balance, impaired activity tolerance, impaired sensation    Functional limitations due to impairments Impaired IND with transfers, gait, stair navigation    Clinical Presentation (PT Evaluation Complexity) Evolving/Changing   Clinical Presentation Rationale clinical judgement, still in the process of medical workup    Clinical Decision Making (Complexity) moderate complexity   Planned Therapy Interventions (PT) balance training;gait training;home exercise program;bed mobility training;patient/family education;stair training;strengthening;transfer training   Risk & Benefits of therapy have been explained evaluation/treatment results reviewed;risks/benefits reviewed;care plan/treatment goals reviewed;patient   PT Discharge Planning   PT Discharge Recommendation (DC Rec) home with assist;home with home care physical therapy;Transitional Care Facility   PT Rationale for DC " Rec Pt with decreased tolerance to activity, pt demonsrates gross functional weakness in BUE/BLE. Difficulty gripping objects with UE to complete tasks. Overall pt requires SBA-CGA with mobility. Use of FWW for impoved support. Pending LOS and progress pt may progress to be safe to DC to home with continued PT services, likely HHPT. However pt does live on 3rd level, will need to be able to tolerate stair negotiation and increased gait distance. If pt continues to be limited in functional strength, balance, activity tolerance pt may require TCU stay to improve functional mobility as pt far below baseline at this time    Total Evaluation Time   Total Evaluation Time (Minutes) 15   Physical Therapy Goals   PT Frequency Daily   PT Predicated Duration/Target Date for Goal Attainment 03/16/22   PT Goals Bed Mobility;Transfers;Gait;Stairs   PT: Bed Mobility Independent   PT: Transfers Independent;Sit to/from stand;Bed to/from chair   PT: Gait Independent;25 feet;Modified independent;Greater than 200 feet   PT: Stairs Supervision/stand-by assist;Greater than 10 stairs  (Pt's apt is on the 3rd floor )

## 2022-03-09 NOTE — ED PROVIDER NOTES
History     Chief Complaint:  Numbness (Patient having numbness in bilateral toes/feet and hands. Patient has been seen in ED multiple times for same. Sent here by Deb Shafer (Neurology) for evaluation.)       FRANCISCO Stevens is a 56 year old female who presents with concern for possible Guillain-Barré.  The patient has had numbness and tingling in her bilateral hands and feet since 2 7.  She is also had associated weakness of her hands and feet as well.  She was seen in the emergency department and number of times now for her symptoms had an MRI of her brain which did not show anything acute she had an EMG at [facility] neurology that showed severe neuropathy she had an MRI of her entire spine.  The thoracic spine showed normal spinal cord, diffuse idiopathic skeletal hyperostosis from C7-T3.  Multilevel cervical spondylosis with moderate central canal stenosis at C5-6 and C6-7 with effacement of intrathecal CSF but no active cord compromise or deformity.  Additional mild central canal stenosis at C3-4, C4-5, and C7-T1.  The MRI of the spine showed minimal retrolisthesis from L2-3 to L4-5 no central canal stenosis.  Mild right L5-S1 inferior foraminal stenosis from inferior foraminal disc herniation without deformity of the exiting right L5 nerve root.  Additional borderline L4-5 and L3-4 inch barrier foraminal stenosis bilaterally.  She was sent in by Dr. Shafer her neurologist who saw her in the office today for concern for progressive weakness and numbness of both hands and feet.  She has but been walking with a walker which is new for her.  She describes her symptoms as feeling as though she has blood in her feet and her hands.  She is unable to do normal things like prepare meals.  She also feels off balance that she is unable to feel her feet placement on the ground.  Dr. Shafer of neurology spoke with me regarding this patient coming over here she would like an LP like her admitted and to see  neurosurgery regarding the abnormal MRI findings and possible treatment with IVIG versus plasma exchange.  Of note she did test positive the first week of January for COVID.  She is vaccinated.  Her symptoms at that time were rhinorrhea and cough.  It is possible that this Guillain-Barré is a post viral inflammation.    ROS:  Review of Systems  As per the HPI, a 10 system review is otherwise negative    Allergies:  H2 Antagonists  Minocycline     Medications:    ibuprofen (ADVIL/MOTRIN) 200 MG tablet  Acetaminophen (TYLENOL PO)  gabapentin (NEURONTIN) 300 MG capsule  lisinopril (ZESTRIL) 20 MG tablet        Past Medical History:    Past Medical History:   Diagnosis Date     Acute stress reaction 9/9/2009     Essential hypertension, benign 3/15/2016     Hyperlipidemia LDL goal <160 10/31/2010     Iron deficiency anemia, unspecified      Knee pain 9/9/2009     Patient Active Problem List   Diagnosis     iamCERVICALGIA     iamTENSION HEADACHE     iamPAIN IN LIMB     Acute stress reaction     Knee pain     HYPERLIPIDEMIA LDL GOAL <160     Essential hypertension, benign     Obesity (BMI 35.0-39.9) with comorbidity (H)     Chronic bilateral low back pain without sciatica     Neuropathy        Past Surgical History:    Past Surgical History:   Procedure Laterality Date      REMOVAL OF OVARY/TUBE(S)  3/99    Salpingo-Oophorectomy, Unilateral     HYSTERECTOMY, PAP NO LONGER INDICATED  3/99     [facility] NONSPECIFIC PROCEDURE  10/29/99    MRI brain normal     [facility] NONSPECIFIC PROCEDURE  02/00    B breast reduction surgery     [facility] TOTAL ABDOM HYSTERECTOMY  3/99    Hysterectomy, Total Abdominal        Family History:    family history includes Family History Negative in her brother, father, and sister; Genitourinary Problems in her mother.    Social History:   reports that she has never smoked. She has never used smokeless tobacco. She reports that she does not drink alcohol and does not use drugs.  PCP: Nick Calderon  Exam     No data found.     Physical Exam    Physical Exam   Constitutional:  Patient is oriented to person, place, and time. They appear well-developed and well-nourished. Mild distress secondary to numbness   HENT:   Mouth/Throat:   Oropharynx is clear and moist.   Eyes:    Conjunctivae normal and EOM are normal. Pupils are equal, round, and reactive to light.   Neck:    Normal range of motion.   Cardiovascular: Normal rate, regular rhythm and normal heart sounds.  Exam reveals no gallop and no friction rub.  No murmur heard.  Pulmonary/Chest:  Effort normal and breath sounds normal. Patient has no wheezes. Patient has no rales.   Abdominal:   Soft. Bowel sounds are normal. Patient exhibits no mass. There is no tenderness. There is no rebound and no guarding.   Musculoskeletal:  Normal range of motion. Patient exhibits no edema.   Neurological:   Patient is alert and oriented to person, place, and time. Patient has 3/5 weakness bilaterally in upper arms. Intrinsic hand muscles weak.  strength 2/5 bilaterally Right patella reflex 1+, left Patella reflex 0 . No cranial nerve deficit or sensory deficit. No voice change. Numbness in hands and feet, not dermatomal. GCS 15  Skin:   Skin is warm and dry. No rash noted. No erythema.   Psychiatric:   Patient has a normal mood         Emergency Department Course   ECG:  Sinus rhythm. Rate 101 BPM. SC interval 152 ms. QRS duration 84 ms. QT/QTc  356/461 ms. No STEMI      Laboratory:  Labs Ordered and Resulted from Time of ED Arrival to Time of ED Departure   COMPREHENSIVE METABOLIC PANEL - Abnormal       Result Value    Sodium 139      Potassium 4.2      Chloride 107      Carbon Dioxide (CO2) 27      Anion Gap 5      Urea Nitrogen 25      Creatinine 1.04      Calcium 9.3      Glucose 120 (*)     Alkaline Phosphatase 59      AST 7      ALT 22      Protein Total 7.7      Albumin 3.5      Bilirubin Total 0.3      GFR Estimate 63     CBC WITH PLATELETS AND DIFFERENTIAL -  Abnormal    WBC Count 9.6      RBC Count 4.74      Hemoglobin 12.8      Hematocrit 41.2      MCV 87      MCH 27.0      MCHC 31.1 (*)     RDW 13.3      Platelet Count 340      % Neutrophils 64      % Lymphocytes 27      % Monocytes 7      % Eosinophils 1      % Basophils 1      % Immature Granulocytes 0      NRBCs per 100 WBC 0      Absolute Neutrophils 6.2      Absolute Lymphocytes 2.6      Absolute Monocytes 0.6      Absolute Eosinophils 0.1      Absolute Basophils 0.1      Absolute Immature Granulocytes 0.0      Absolute NRBCs 0.0     ERYTHROCYTE SEDIMENTATION RATE AUTO - Abnormal    Erythrocyte Sedimentation Rate 37 (*)    CRP INFLAMMATION - Abnormal    CRP Inflammation 9.3 (*)    GLUCOSE CSF - Normal    Glucose CSF 68     PROTEIN TOTAL CSF - Normal    Protein total CSF 42     CK TOTAL - Normal    CK 33     CELL COUNT CSF    Tube Number 4      Color Colorless      Clarity Clear      Total Nucleated Cells 1      RBC Count 1     CELL COUNT WITH DIFFERENTIAL CSF        [facility]    -Lumbar Puncture    Date/Time: 3/17/2022 6:30 PM  Performed by: Jazmín Iglesias MD  Authorized by: Jazmín Iglesias MD     Risks, benefits and alternatives discussed.      PRE-PROCEDURE DETAILS:     Procedure purpose:  Diagnostic    ANESTHESIA (see MAR for exact dosages):     Anesthesia method:  Local infiltration    Local anesthetic:  Lidocaine 1% w/o epi      PROCEDURE DETAILS:     Lumbar space:  L4-L5 interspace    Patient position:  Sitting    Needle gauge:  20    Needle type:  Spinal needle - Quincke tip    Needle length (in):  3.5    Ultrasound guidance: no      Number of attempts:  3    Fluid appearance:  Clear    Tubes of fluid:  4    Total volume (ml):  4    POST-PROCEDURE:     Puncture site:  Adhesive bandage applied        PROCEDURE    Patient Tolerance:  Patient tolerated the procedure well with no immediate complications         Emergency Department Course:             Reviewed:  I  reviewed nursing notes, vitals and past medical history    Assessments:   I obtained history and examined the patient as noted above.    I rechecked the patient and explained findings.       Consults:   I consulted [facility] of radiology after LP results  susan huang  I consulted hospitalist for admission    Interventions:  Medications   morphine (PF) injection 4 mg (4 mg Intravenous Given 3/8/22 2045)        Disposition:  The patient was admitted to the hospital under the care of Dr. Mon.     Impression & Plan    CMS Diagnoses: None      Covid-19  Jimmy Stevens was evaluated during a global COVID-19 pandemic, which necessitated consideration that the patient might be at risk for infection with the SARS-CoV-2 virus that causes COVID-19.   Applicable protocols for evaluation were followed during the patient's care.   COVID-19 was considered as part of the patient's evaluation. This patient is CO ID recov [id number]    Medical Decision Making:  Jimmy Stevens is a 56 year old female who presents from her neurology office for progrgessive numbness and weakness of all four extremities. She had no shortness of breath or voice change. She has had MRI of head and entire spine which showed multiple abnormalituis and neuro also wanted neurosurgery input on these findings. I did basic bloodwork and LP. LP was normal, no elevated protein. I then spoke with neurology regarding these findings. Being that there is not elevated protein, this makes GB highly unlikely. At this time, I will admit to hospitalist. Neuro and NS will be consulted, but not needed emergently.        Diagnosis:    ICD-10-CM    1. Neuropathy  G62.9 Home Care Referral   2. Generalized muscle weakness  M62.81 Walker Order for DME - ONLY FOR DME        Discharge Medications:  Discharge Medication List as of 3/11/2022  4:50 PM           3/8/2022   Jazmín Iglesias MD Bochert, Michelle Ann, MD  03/17/22 1950     10 y/o male with no sig PMHx BIB mother c/o upper back pain after fall while tackling during foot ball playing, no head trauma, no LOC ,  no neck pain

## 2022-03-09 NOTE — PLAN OF CARE
Goal Outcome Evaluation:    Pt here with bilateral numbness to hands and feet. A&Ox4. Neuros intact x for numbness to extremitites. VSS. Tele sinus tachycardia. regular diet, thin liquids. Takes pills whole. Up with Ax1 GB. Reporting pain to lower back, ibuprofen given and heat pack placed. Pt scoring green on the Aggression Stop Light Tool. Discharge plan pending workup.

## 2022-03-09 NOTE — PLAN OF CARE
Pt here with numbness in bilateral hands and feet. LP performed for concern of Guillian-Lithonia syndromes. A&Ox4. Neuros numbness in hands and feet and generalized weakness. VSS. Tele ST. Regular diet, thin liquids. Takes pills whole. Up with A1/GBW. Denies pain. Pt scoring green on the Aggression Stop Light Tool. Discharge pending.

## 2022-03-09 NOTE — UTILIZATION REVIEW
[facility] Utilization Review  Admission Status; Secondary Review Determination     Admission Date: 3/8/2022  6:56 PM      Under the authority of the Utilization Management Committee, the utilization review process indicated a secondary review on the above patient.  The review outcome is based on review of the medical records, discussions with staff, and applying clinical experience noted on the date of the review.        (X)      Inpatient Status Appropriate - This patient's medical care is consistent with medical management for inpatient care and reasonable inpatient medical practice.          RATIONALE FOR DETERMINATION   56-year-old female without significant past medical history referred to the hospital by her neurologist for work-up for numbness in the hands and the feet, started feeling clumsy, works at a [employer]'s and consequently and was having difficulties at work.  EMG on 2/22 showed polyneuropathy, superimposed very severe left median neuropathy across the wrist with markedly progressive latency, conduction block.  MRI thoracic spine showed diffuse idiopathic skeletal hyperostosis from C7 to upper T3 with multilevel cervical spondylosis resulting in moderate central canal stenosis at C5-6 and C6/7.  Plan for LP, IVIG versus plasma exchange. She had low grade fevers and tachycardia. LP was done and CSF was normal with normal protein, but several abnormalities on imaging, possibly related to long-haul Covid, but has severely abnormal EMG.  Patient needs further work-up and ongoing interventions by neurology and neurosurgery team, anticipate more than 2 midnight hospital stay, recommend continue inpatient status      The severity of illness, intensity of service provided, expected LOS and risk for adverse outcome make the care complex, high risk and appropriate for hospital admission.The patient requires hospital based medical care which is anticipated to require a stay of 2 or more midnights.         The information on this document is developed by the utilization review team in order for the business office to ensure compliance.  This only denotes the appropriateness of proper admission status and does not reflect the quality of care rendered.              Sincerely,       Hunter Pitts MD  Physician Advisor  Utilization Review-Mark    Phone: [phone]

## 2022-03-09 NOTE — PROGRESS NOTES
03/09/22 1600   Quick Adds   Type of Visit Initial Occupational Therapy Evaluation   Living Environment   People in Home child(lazara), adult   Current Living Arrangements apartment   Transportation Anticipated family or friend will provide   Living Environment Comments Pt lives in 3rd floor apartment without elevator, Tub shower without grab bars, pt has shower chair   Self-Care   Usual Activity Tolerance good   Current Activity Tolerance fair   Equipment Currently Used at Home walker, standard;shower chair  (walking stick)   Fall history within last six months yes   Number of times patient has fallen within last six months 3   Activity/Exercise/Self-Care Comment Pt IND at baseline with mobility and I/ADLs, works at a bank. Reports her daughter was assisting with dressing tasks requiring hand strength (hooking bra), and supervising bathing. 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  EMG on 2/22 showed polyneuropathy, superimposed very severe left median neuropathy across the wrist with markedly progressive latency, conduction block.  MRI thoracic spine showed diffuse idiopathic skeletal hyperostosis from C7 to upper T3 with multilevel cervical spondylosis resulting in moderate central canal stenosis at C5-6 and C6/7.  Plan for LP, IVIG versus plasma exchange. She had low grade fevers and tachycardia.  LP was done and CSF was normal with normal protein, but several abnormalities on imaging, possibly related to long-haul Covid, but has severely abnormal EMG.   Existing Precautions/Restrictions fall   Cognitive Status Examination   Orientation Status orientation to person, place and time   Affect/Mental Status (Cognitive) WFL   Follows Commands WFL   Visual Perception   Visual Impairment/Limitations WFL   Sensory   Sensory Comments Bilateral hands and feet tingling   Pain Assessment   Patient Currently in Pain No   Integumentary/Edema   Integumentary/Edema no deficits were identifed   Posture   Posture forward head position   Range of Motion Comprehensive   General Range of Motion bilateral upper extremity ROM WFL   Strength Comprehensive (MMT)   General Manual Muscle Testing (MMT) Assessment upper extremity strength deficits identified;hand strength deficits identified   Upper Extremity (Manual Muscle Testing)   Upper Extremity: Manual Muscle Testing (MMT) left shoulder strength deficit;right shoulder strength deficit;left elbow/forearm strength deficit;right elbow/forearm strength deficit   Comment, MMT: Upper Extremity B UE 3/5 overall   Hand Strength   Hand Strength Comments Pt with 3/5  bilaterally   Muscle Tone Assessment   Muscle Tone Quick Adds No deficits were identified   Coordination   Upper Extremity Coordination No deficits were identified   Bed Mobility   Bed Mobility supine-sit;sit-supine   Supine-Sit Morton (Bed Mobility) supervision   Sit-Supine Morton (Bed Mobility) supervision   Transfers   Transfers sit-stand transfer;toilet transfer   Sit-Stand Transfer   Sit-Stand Morton (Transfers) contact guard;verbal cues   Toilet Transfer   Type (Toilet Transfer) sit-stand;stand-sit   Morton Level (Toilet Transfer) minimum assist (75% patient effort);verbal cues   Assistive Device (Toilet Transfer) walker, front-wheeled   Toilet Transfer Comments From low toilet   Activities of  Daily Living   BADL Assessment/Intervention lower body dressing;upper body dressing;toileting;grooming   Upper Body Dressing Assessment/Training   [place] Level (Upper Body Dressing) minimum assist (75% patient effort)   Lower Body Dressing Assessment/Training   [place] Level (Lower Body Dressing) minimum assist (75% patient effort)   Grooming Assessment/Training   [place] Level (Grooming) minimum assist (75% patient effort)   Toileting   [place] Level (Toileting) supervision   Clinical Impression   Criteria for Skilled Therapeutic Interventions Met (OT) Yes, treatment indicated   OT Diagnosis Impaired independence with functional mobility and I/ADLs   OT Problem List-Impairments impacting ADL problems related to;activity tolerance impaired;coordination;mobility;strength;sensation;pain   Assessment of Occupational Performance 1-3 Performance Deficits   Planned Therapy Interventions (OT) ADL retraining;IADL retraining;fine motor coordination training;strengthening;ROM;neuromuscular re-education;transfer training   Clinical Decision Making Complexity (OT) moderate complexity   Anticipated Equipment Needs Upon Discharge (OT)   (TBD)   Risk & Benefits of therapy have been explained evaluation/treatment results reviewed;care plan/treatment goals reviewed;risks/benefits reviewed;current/potential barriers reviewed;participants voiced agreement with care plan;participants included;patient   OT Discharge Planning   OT Discharge Recommendation (DC Rec) home with assist;home with home care occupational therapy;Transitional Care Facility   OT Rationale for DC Rec Pt below baseline with functional mobility and I/ADLs, limited by decreased activity tolerance, decreased strength, and impaired sensation. Anticipate pt will be safe to return home with assist from daughter as needed for I/ADLs, per pt, her daughter works from home. If pt does not make good progress during hospital stay, may benefit from TCU stay  to maximize independence and safety with I/ADLs   OT Brief overview of current status SBA bed mobility, CGA transfers and ambulation, min A toilet transfer from lower toilet   Total Evaluation Time (Minutes)   Total Evaluation Time (Minutes) 10   OT Goals   Therapy Frequency (OT) Daily   OT Predicated Duration/Target Date for Goal Attainment 03/14/22   OT Goals Hygiene/Grooming;Upper Body Dressing;Lower Body Dressing;Transfers;Toilet Transfer/Toileting   OT: Hygiene/Grooming modified independent;while standing   OT: Upper Body Dressing Modified independent;including set-up/clothing retrieval   OT: Lower Body Dressing Modified independent;including set-up/clothing retrieval;using adaptive equipment   OT: Transfer Modified independent;with assistive device   OT: Toilet Transfer/Toileting Modified independent;toilet transfer;cleaning and garment management;using adaptive equipment

## 2022-03-09 NOTE — ED PROVIDER NOTES
History     Chief Complaint:  Numbness in hands and feet     HPI   Jimmy Stevens is a 56 year old female who presents with concern for possible Guillain-Barré.  The patient has had numbness and tingling in her bilateral hands and feet since 2/7.  She is also had associated weakness of her hands and feet as well.  She was seen in the emergency department and number of times now for her symptoms, and had an MRI of her brain which did not show anything acute. She had an EMG at [facility] neurology that showed severe neuropathy. She had an MRI of her entire spine; the thoracic spine showed normal spinal cord, diffuse idiopathic skeletal hyperostosis from C7-T3.  Multilevel cervical spondylosis with moderate central canal stenosis at C5-6 and C6-7 with effacement of intrathecal CSF but no active cord compromise or deformity.  Additional mild central canal stenosis at C3-4, C4-5, and C7-T1.  The MRI of the spine showed minimal retrolisthesis from L2-3 to L4-5 no central canal stenosis.  Mild right L5-S1 inferior foraminal stenosis from inferior foraminal disc herniation without deformity of the exiting right L5 nerve root.  Additional borderline L4-5 and L3-4 inch barrier foraminal stenosis bilaterally.  She was sent in by Dr. Shafer her neurologist who saw her in the office today for concern for progressive weakness and numbness of both hands and feet.  She has but been walking with a walker which is new for her.  She describes her symptoms as feeling as though she has blood in her feet and her hands.  She is unable to do normal things like prepare meals.  She also feels off balance that she is unable to feel her feet placement on the ground.  Dr. Shafer of neurology spoke with me regarding this patient coming over here she would like an LP like her admitted and to see neurosurgery regarding the abnormal MRI findings and possible treatment with IVIG versus plasma exchange.  Of note she did test positive the  "first week of January for COVID.  She is vaccinated.  Her symptoms at that time were rhinorrhea and cough.  It is possible that this Guillain-Barré is a post viral inflammation.    ROS:  Review of Systems   Constitutional: Positive for activity change.   HENT: Positive for rhinorrhea (resolved).    Respiratory: Positive for cough (resolved).    Neurological: Positive for weakness and numbness.   All other systems reviewed and are negative.    Allergies:  H2 Antagonists  Minocycline     Medications:    Neurontin  Zestril  Tylenol  Ibuprofen    Past Medical History:    Acute stress reaction  Hypertension  Hyperlipidemia  Anemia  Obesity  Cervicalgia  Joint pain    Past Surgical History:    Salpingo-oophorectomy unilateral  Hysterectomy  Breast reduction surgery    Social History:  Presents with co-worker  No tobacco or alcohol use  Covid vaccinated x1    Physical Exam     Patient Vitals for the past 24 hrs:   BP Temp Temp src Pulse Resp SpO2 Height Weight   03/08/22 2230 (!) 162/113 -- -- 106 14 99 % -- --   03/08/22 2220 (!) 160/101 -- -- 106 13 99 % -- --   03/08/22 2200 (!) 154/95 -- -- 103 19 99 % -- --   03/08/22 2150 (!) 176/100 -- -- 100 28 100 % -- --   03/08/22 2130 (!) 157/95 -- -- 101 18 96 % -- --   03/08/22 2120 (!) 158/95 -- -- 99 19 97 % -- --   03/08/22 2100 (!) 142/98 -- -- 101 -- 100 % -- --   03/08/22 2000 (!) 148/102 -- -- 101 15 -- -- --   03/08/22 1940 (!) 124/97 -- -- 105 19 100 % -- --   03/08/22 1920 (!) 133/90 -- -- 104 20 100 % -- --   03/08/22 1910 (!) 136/92 -- -- -- -- -- -- --   03/08/22 1743 (!) 153/105 96.9  F (36.1  C) Temporal 104 22 99 % 1.626 m (5' 4\") 83.9 kg (185 lb)        Physical Exam     Physical Exam   Constitutional:  Patient is oriented to person, place, and time. They appear well-developed and well-nourished. Mild distress secondary to numbness and weakness. No facial droop.  HENT:   Mouth/Throat:   Oropharynx is clear and moist. No swallowing difficulties.   Eyes: "    Conjunctivae normal and EOM are normal. Pupils are equal, round, and reactive to light.   Neck:    Normal range of motion.   Cardiovascular: Normal rate, regular rhythm and normal heart sounds.  Exam reveals no gallop and no friction rub.  No murmur heard.  Pulmonary/Chest:  Effort normal and breath sounds normal. Patient has no wheezes. Patient has no rales.   Abdominal:   Soft. Bowel sounds are normal. Patient exhibits no mass. There is no tenderness. There is no rebound and no guarding.   Musculoskeletal:  Normal range of motion. Patient exhibits no edema.   Neurological:   Patient is A&Ox3. She has significantly altered sensation of both hands circumfrontally. She has intrinsic muscle weakness. She cannot strongly hold her fingers together. She has weakness of her feet, and she weakly plantar endorsi flexes. Numbness over both feet to the ankles. She has a 2+ patella reflex on the right. I am unable to illicit a patella reflex on the left.  Skin:   Skin is warm and dry. No rash noted. No erythema.   Psychiatric:   Patient has a normal mood and affect. Patient's behavior is normal. Judgment and thought content normal.     Emergency Department Course   ECG  ECG taken at 1950, ECG read at 2010  Sinus tachycardia; possible left atrial enlargement; nonspecific T wave abnormality   No prior ECG for comparison.  Rate 101 bpm. WY interval 152 ms. QRS duration 84 ms. QT/QTc 356/461 ms. P-R-T axes 53 1 8.      Laboratory:  Labs Ordered and Resulted from Time of ED Arrival to Time of ED Departure   COMPREHENSIVE METABOLIC PANEL - Abnormal       Result Value    Sodium 139      Potassium 4.2      Chloride 107      Carbon Dioxide (CO2) 27      Anion Gap 5      Urea Nitrogen 25      Creatinine 1.04      Calcium 9.3      Glucose 120 (*)     Alkaline Phosphatase 59      AST 7      ALT 22      Protein Total 7.7      Albumin 3.5      Bilirubin Total 0.3      GFR Estimate 63     CBC WITH PLATELETS AND DIFFERENTIAL - Abnormal     WBC Count 9.6      RBC Count 4.74      Hemoglobin 12.8      Hematocrit 41.2      MCV 87      MCH 27.0      MCHC 31.1 (*)     RDW 13.3      Platelet Count 340      % Neutrophils 64      % Lymphocytes 27      % Monocytes 7      % Eosinophils 1      % Basophils 1      % Immature Granulocytes 0      NRBCs per 100 WBC 0      Absolute Neutrophils 6.2      Absolute Lymphocytes 2.6      Absolute Monocytes 0.6      Absolute Eosinophils 0.1      Absolute Basophils 0.1      Absolute Immature Granulocytes 0.0      Absolute NRBCs 0.0     ERYTHROCYTE SEDIMENTATION RATE AUTO - Abnormal    Erythrocyte Sedimentation Rate 37 (*)    CRP INFLAMMATION - Abnormal    CRP Inflammation 9.3 (*)    GLUCOSE CSF - Normal    Glucose CSF 68     PROTEIN TOTAL CSF - Normal    Protein total CSF 42     CK TOTAL - Normal    CK 33     CELL COUNT CSF    Tube Number 4      Color Colorless      Clarity Clear      Total Nucleated Cells 1      RBC Count 1     AEROBIC BACTERIAL CULTURE ROUTINE    Gram Stain Result No organisms seen     CELL COUNT WITH DIFFERENTIAL CSF        [facility]    -Lumbar Puncture    Date/Time: 3/8/2022 10:50 PM  Performed by: Jazmín Iglesias MD  Authorized by: Jazmín Iglesias MD     Risks, benefits and alternatives discussed.      UNIVERSAL PROTOCOL   Site Marked: NA  Prior Images Obtained and Reviewed:  NA  Required items: Required blood products, implants, devices and special equipment available    Patient identity confirmed:  Verbally with patient  NA - No sedation, light sedation, or local anesthesia  Confirmation Checklist:  Patient's identity using two indicators  Time out: Immediately prior to the procedure a time out was called    Universal Protocol: the Joint Commission Universal Protocol was followed    Preparation: Patient was prepped and draped in usual sterile fashion      PRE-PROCEDURE DETAILS:     Procedure purpose:  Diagnostic    ANESTHESIA (see MAR for exact dosages):      Anesthesia method:  Local infiltration    Local anesthetic:  Lidocaine 1% w/o epi      PROCEDURE DETAILS:     Lumbar space:  L4-L5 interspace    Patient position:  Sitting    Needle gauge:  20    Needle type:  Spinal needle - Quincke tip    Needle length (in):  3.5    Ultrasound guidance: no      Number of attempts:  3    Fluid appearance:  Clear    Tubes of fluid:  4    Total volume (ml):  4    POST-PROCEDURE:     Puncture site:  Adhesive bandage applied        PROCEDURE    Patient Tolerance:  Patient tolerated the procedure well with no immediate complications     Emergency Department Course:    Reviewed:  I reviewed nursing notes, vitals, past medical history, Care Everywhere and [facility]    Assessments:  1902 I obtained history and examined the patient as noted above.   2039 I rechecked the patient and explained findings.   2229 I rechecked the patient.    Consults:   2210 I consulted with Dr. Ribeiro from [facility]  2217 I consulted with Dr. Collins from hospitalist service.    Interventions:  2045: Morphine 4 mg IV    Disposition:  The patient was admitted to the hospital under the care of Dr. Collins.     Impression & Plan    CMS Diagnoses: None    Covid-19  Jimmy Stevens was evaluated during a global COVID-19 pandemic, which necessitated consideration that the patient might be at risk for infection with the SARS-CoV-2 virus that causes COVID-19.   Applicable protocols for evaluation were followed during the patient's care.   COVID-19 was considered as part of the patient's evaluation. The plan for testing is:  a test was obtained during this visit.  She is Covid recovered    Medical Decision Making:  Jimmy Stevens is a 56-year-old female who was sent over here from her neurology clinic for an LP to help determine whether she had Guillain-Barré symptoms I agree were concerning for this.  She had profound weakness of her hands and her legs with associated numbness.  She did not have  any breathing problems or swallowing difficulties the LP did not show any evidence of high protein her white count was normal and frankly the entire LP is normal at this time I do not really have a cause for her symptoms certainly there are a lot of abnormalities on her thoracic and lumbar spine but not sure how to fit this into the distribution of where she where she has symptoms.  At this time that I will admit her to hospital they will have general neurology consult as well as neurosurgery consult to give their opinion about the findings of the MRI.  I did explain to the patient that we have not discovered that the cause of her symptoms but that we will bring her in to do further testing.    Diagnosis:    ICD-10-CM    1. Neuropathy  G62.9    2. Generalized muscle weakness  M62.81         Discharge Medications:  New Prescriptions    No medications on file        3/8/2022   Jazmín Iglesias MD Bochert, Michelle Ann, MD  03/08/22 1540

## 2022-03-09 NOTE — ED NOTES
DATE:  3/8/2022   TIME OF RECEIPT FROM LAB: 2208  LAB TEST: CSF fluid  LAB VALUE:  No organism found  RESULTS GIVEN WITH READ-BACK TO (PROVIDER):  Jazmín Iglesias MD  TIME LAB VALUE REPORTED TO PROVIDER:   8532

## 2022-03-09 NOTE — CONSULTS
[facility]    Neurosurgery Consultation     Date of Admission:  3/8/2022  Date of Consult (When I saw the patient): 03/09/22    Assessment & Plan   Jimmy Stevens is a 56 year old female who was admitted on 3/8/2022 with concern of Guillain-Sheldon syndrome. Her neurologist Dr. Shafer recommended she present to the ED for a lumbar puncture, which was overall normal with a glucose of 68 and protein of 42. Patient's main concern is worsening numbness in feet and hands that started intermittently a few months ago, but worsened in February 2022. She also reports bilateral leg weakness that started last summer, and hand weakness that started in February 2022. She notes chronic mid left sided back pain that has been present for a few years, but her main concern is the numbness/weakness. EMG performed on 02/22/2022 showed severe left median neuropathy across the wrist, no evidence of cervical or lumbosacral radiculopathy. MRI of the brain from 2/13/22 showed no evidence of acute intracranial abnormality. MRI of the lumbar spine from 2/28/22 showed mild lumbar spondylosis without significant central stenosis, mild multilevel foraminal stenosis. MRI of the cervical spine from 2/28/22 showed diffuse idiopathic skeletal hyperostosis from C7-T3 resulting in moderate central canal stenosis at C5-7, no cord compression or deformity. MRI reports are in Care Everywhere, but images are not available to review yet.     Active Problems:    Neuropathy      Plan:   - Obtain outside images from recent cervical, thoracic, lumbar spine MRIs at [facility] 2/28/22  - Neurology consult  - Continue PT/OT and pain control measures as needed  - NSG will continue to follow and review imaging once obtained     I have discussed the following assessment and plan with Dr. Bates who is in agreement with initial plan and will follow up with further consultation recommendations.    Carri Garrett, CNP  [facility]  "Neurosurgery  [facility]  [address] [place] [address] [address]  [place] [address]  Tel [phone]  Pager [phone]    Code Status    Full Code    Reason for Consult   Reason for consult: I was asked by Dr. Reid to evaluate this patient for \"multiple non specific neurologic complaints in the setting of abnormal spine MRI\".    Primary Care Physician   Nick Calderon    Chief Complaint   Weakness, numbness     History is obtained from the patient and electronic health record    History of Present Illness   Jimmy Stevens is a 56 year old female who was admitted on 3/8/2022 with concern of Guillain-Melissa syndrome. Her neurologist Dr. Shafer recommended she present to the ED for a lumbar puncture, which was overall normal with a glucose of 68 and protein of 42. Patient's main concern is worsening numbness in feet and hands that started intermittently a few months ago, but worsened in February 2022. She also reports bilateral leg weakness that started last summer, and hand weakness that started in February 2022. She reports difficulty using her hands and dropping objects. She reports most of the leg weakness if from knees to feet, bilaterally. She notes chronic mid left sided back pain that has been present for a few years, but her main concern is the numbness/weakness. She denies radicular pain in arms or legs. Denies saddle anesthesia or bowel/bladder changes.     EMG 2/23/22  Conclusion:     Axonal sensory-motor polyneuropathy.     Superimposed very severe left median neuropathy across the wrist (CTS),   with markedly prolonged latency and probably conduction block (given   reduced amplitude but with normal motor units).       No evidence of cervical or lumbosacral radiculopathies.     MRI brain 2/13/22  IMPRESSION:  1.  No evidence of acute infarction or other acute intracranial abnormality.  2.  Mild scattered T2-hyperintense white matter signal abnormalities are nonspecific but commonly " reflect chronic small vessel ischemic change. Chronic migraine sequelae and demyelination could exhibit a similar appearance.  3.  Small bilateral mastoid effusions. Mastoiditis should be excluded clinically.  4.  Empty sella configuration. The finding is most often incidental. However, the finding can be seen in association with idiopathic intracranial hypertension among a subset of patients    MRI cervical spine 2/28/22  IMPRESSION  1.  Normal cervical and upper thoracic spinal cord.   2.  Diffuse idiopathic skeletal hyperostosis from C7 to upper T3   3.  Multilevel cervical spondylosis as outlined. Resulting moderate central canal stenosis at C5-6 and C6-7 with effacement of intrathecal CSF but no active cord compression or deformity. Additional mild central canal stenosis at C3-4, C4-5, and C7-T1.   4.  Patent neural foramina in the cervical spine.     MRI thoracic spine 2/28/22  IMPRESSION  1.  Normal thoracic spinal cord and conus medullaris.   2.  Mild dextroconvex thoracic scoliosis, minor multilevel lower thoracic spondylosis, without thoracic spinal stenosis.      MRI lumbar spine 2/28/22  IMPRESSION  1.  Mild dextroconvex lumbar spinal curvature with minimal retrolisthesis from L2-3 to L4-5. No associated spondylolysis.   2.  Mild multilevel lumbar spondylosis as outlined, without central canal stenosis in the lumbar spine.   3.  Mild right L5-S1 inferior foraminal stenosis from inferior foraminal disc herniation, without deformity of the exiting right L5 nerve root. Additional borderline L4-5 and L3-4 inferior foraminal stenosis bilaterally.     Glucose CSF: 68  Protein total CSF: 42    Past Medical History   I have reviewed this patient's medical history and updated it with pertinent information if needed.   Past Medical History:   Diagnosis Date     Acute stress reaction 9/9/2009     Essential hypertension, benign 3/15/2016     Hyperlipidemia LDL goal <160 10/31/2010     Iron deficiency anemia,  unspecified      Knee pain 9/9/2009       Past Surgical History   I have reviewed this patient's surgical history and updated it with pertinent information if needed.  Past Surgical History:   Procedure Laterality Date     [facility] REMOVAL OF OVARY/TUBE(S)  3/99    Salpingo-Oophorectomy, Unilateral     HYSTERECTOMY, PAP NO LONGER INDICATED  3/99     [facility] NONSPECIFIC PROCEDURE  10/29/99    MRI brain normal     [facility] NONSPECIFIC PROCEDURE  02/00    B breast reduction surgery     [facility] TOTAL ABDOM HYSTERECTOMY  3/99    Hysterectomy, Total Abdominal       Prior to Admission Medications   Prior to Admission Medications   Prescriptions Last Dose Informant Patient Reported? Taking?   Acetaminophen (TYLENOL PO)   Yes No   Sig: Take by mouth every 6 hours as needed for mild pain or fever   gabapentin (NEURONTIN) 300 MG capsule 3/7/2022 at Unknown time  No Yes   Sig: Take 1 capsule (300 mg) by mouth 3 times daily   ibuprofen (ADVIL/MOTRIN) 200 MG tablet 3/8/2022 at 1100  Yes Yes   Sig: Take 400 mg by mouth every 6 hours as needed for mild pain   lisinopril (ZESTRIL) 20 MG tablet   No No   Sig: TAKE 1 TABLET BY MOUTH TWICE A DAY      Facility-Administered Medications: None     Allergies   Allergies   Allergen Reactions     H2 Antagonists      Mylan (itching)     Minocycline        Social History   I have reviewed this patient's social history and updated it with pertinent information if needed. Jimmy Stevens  reports that she has never smoked. She has never used smokeless tobacco. She reports that she does not drink alcohol and does not use drugs.    Family History   I have reviewed this patient's family history and updated it with pertinent information if needed.   Family History   Problem Relation Age of Onset     Genitourinary Problems Mother         B:1942 Alive     Family History Negative Father         B:1940 Alive     Family History Negative Sister         5 sisters all healthy     Family History Negative Brother         1  "brother healthy       Review of Systems   10 point ROS negative other than symptoms noted in HPI.    Physical Exam   Temp: 98.3  F (36.8  C) Temp src: Oral BP: (!) 142/95 Pulse: 102   Resp: 16 SpO2: 95 % O2 Device: None (Room air)    Vital Signs with Ranges  Temp:  [96.9  F (36.1  C)-100  F (37.8  C)] 98.3  F (36.8  C)  Pulse:  [] 102  Resp:  [13-28] 16  BP: (124-176)/() 142/95  SpO2:  [95 %-100 %] 95 %  185 lbs 0 oz     , Blood pressure (!) 142/95, pulse 102, temperature 98.3  F (36.8  C), temperature source Oral, resp. rate 16, height 5' 4\" (1.626 m), weight 185 lb (83.9 kg), SpO2 95 %, not currently breastfeeding.  185 lbs 0 oz  HEENT:  Normocephalic, atraumatic.  PERRLA.  EOM s intact.   Heart:  No peripheral edema  Lungs:  No SOB  Skin:  Warm and dry, good capillary refill.    NEUROLOGICAL EXAMINATION:   Mental status:  Alert and Oriented x 3, speech is fluent.  Cranial nerves:  II-XII intact.   Motor:   Generalized weakness, but able to move all extremities equally and bilaterally.   Able to lift legs and arms off the bed.   4/5  strength bilaterally.   Sensation:  Decreased sensation in bilateral hands and feet   Reflexes:   Negative Babinski.  Negative Clonus.  Negative Camilo's.   Coordination:  Smooth finger to nose testing.   Negative pronator drift.      Data     CBC RESULTS:   Recent Labs   Lab Test 03/09/22  0819   WBC 9.7   RBC 4.56   HGB 12.3   HCT 38.3   MCV 84   MCH 27.0   MCHC 32.1   RDW 13.4        Basic Metabolic Panel:  Lab Results   Component Value Date     03/09/2022     04/08/2021      Lab Results   Component Value Date    POTASSIUM 4.0 03/09/2022    POTASSIUM 4.1 04/08/2021     Lab Results   Component Value Date    CHLORIDE 105 03/09/2022    CHLORIDE 104 04/08/2021     Lab Results   Component Value Date    RAI 9.1 03/09/2022    RAI 9.5 04/08/2021     Lab Results   Component Value Date    CO2 27 03/09/2022    CO2 33 04/08/2021     Lab Results   Component " Value Date    BUN 24 03/09/2022    BUN 12 04/08/2021     Lab Results   Component Value Date    CR 0.85 03/09/2022    CR 0.73 04/08/2021     Lab Results   Component Value Date     03/09/2022     04/08/2021     INR:  Lab Results   Component Value Date    INR 0.96 04/25/2010

## 2022-03-10 ENCOUNTER — APPOINTMENT (OUTPATIENT)
Dept: OCCUPATIONAL THERAPY | Facility: CLINIC | Age: 57
End: 2022-03-10
Payer: COMMERCIAL

## 2022-03-10 ENCOUNTER — APPOINTMENT (OUTPATIENT)
Dept: PHYSICAL THERAPY | Facility: CLINIC | Age: 57
End: 2022-03-10
Payer: COMMERCIAL

## 2022-03-10 LAB
B BURGDOR IGG+IGM SER QL: 0.03
FOLATE SERPL-MCNC: 17.2 NG/ML
PROT PATTERN SERPL IFE-IMP: NORMAL
VIT B12 SERPL-MCNC: 290 PG/ML (ref 193–986)

## 2022-03-10 PROCEDURE — 97116 GAIT TRAINING THERAPY: CPT | Mod: GP | Performed by: PHYSICAL THERAPIST

## 2022-03-10 PROCEDURE — 99225 PR SUBSEQUENT OBSERVATION CARE,LEVEL II: CPT | Performed by: HOSPITALIST

## 2022-03-10 PROCEDURE — 250N000013 HC RX MED GY IP 250 OP 250 PS 637: Performed by: INTERNAL MEDICINE

## 2022-03-10 PROCEDURE — G0378 HOSPITAL OBSERVATION PER HR: HCPCS

## 2022-03-10 PROCEDURE — 97110 THERAPEUTIC EXERCISES: CPT | Mod: GO | Performed by: OCCUPATIONAL THERAPIST

## 2022-03-10 PROCEDURE — 999N000226 HC STATISTIC SLP IP EVAL DEFER: Performed by: SPEECH-LANGUAGE PATHOLOGIST

## 2022-03-10 PROCEDURE — 97535 SELF CARE MNGMENT TRAINING: CPT | Mod: GO | Performed by: OCCUPATIONAL THERAPIST

## 2022-03-10 PROCEDURE — 86334 IMMUNOFIX E-PHORESIS SERUM: CPT | Mod: 26 | Performed by: PATHOLOGY

## 2022-03-10 PROCEDURE — 120N000001 HC R&B MED SURG/OB

## 2022-03-10 RX ADMIN — GABAPENTIN 300 MG: 300 CAPSULE ORAL at 08:08

## 2022-03-10 RX ADMIN — GABAPENTIN 300 MG: 300 CAPSULE ORAL at 16:54

## 2022-03-10 RX ADMIN — GABAPENTIN 300 MG: 300 CAPSULE ORAL at 21:00

## 2022-03-10 RX ADMIN — IBUPROFEN 400 MG: 200 TABLET, FILM COATED ORAL at 12:00

## 2022-03-10 RX ADMIN — LISINOPRIL 20 MG: 20 TABLET ORAL at 08:08

## 2022-03-10 ASSESSMENT — ACTIVITIES OF DAILY LIVING (ADL)
ADLS_ACUITY_SCORE: 11
WEAR_GLASSES_OR_BLIND: NO
ADLS_ACUITY_SCORE: 9
FALL_HISTORY_WITHIN_LAST_SIX_MONTHS: YES
CONCENTRATING,_REMEMBERING_OR_MAKING_DECISIONS_DIFFICULTY: NO
ADLS_ACUITY_SCORE: 9
ADLS_ACUITY_SCORE: 11
ADLS_ACUITY_SCORE: 9
CHANGE_IN_FUNCTIONAL_STATUS_SINCE_ONSET_OF_CURRENT_ILLNESS/INJURY: NO
ADLS_ACUITY_SCORE: 9
DRESSING/BATHING_DIFFICULTY: NO
DIFFICULTY_EATING/SWALLOWING: NO
ADLS_ACUITY_SCORE: 9
WALKING_OR_CLIMBING_STAIRS_DIFFICULTY: NO
ADLS_ACUITY_SCORE: 11
ADLS_ACUITY_SCORE: 9
ADLS_ACUITY_SCORE: 11
ADLS_ACUITY_SCORE: 9
ADLS_ACUITY_SCORE: 9
ADLS_ACUITY_SCORE: 5
DOING_ERRANDS_INDEPENDENTLY_DIFFICULTY: NO
ADLS_ACUITY_SCORE: 9
TOILETING_ISSUES: NO
ADLS_ACUITY_SCORE: 11
ADLS_ACUITY_SCORE: 11
ADLS_ACUITY_SCORE: 9
ADLS_ACUITY_SCORE: 11
ADLS_ACUITY_SCORE: 9
ADLS_ACUITY_SCORE: 11
ADLS_ACUITY_SCORE: 9
ADLS_ACUITY_SCORE: 9
ADLS_ACUITY_SCORE: 11
ADLS_ACUITY_SCORE: 9

## 2022-03-10 NOTE — PLAN OF CARE
[id number]  Reason for Admission: Pt her for numbness BUE, BLE    Cognitive/Mentation: A/Ox 4  Neuros/CMS: Intact ex numbness BUE, BLE, generalized weakness, weak hand grasp  VS: stable,.   Tele: Sinus rhythm to sinus tach.  GI: BS active x4, passing flatus. Continent.  : Voding without difficulty. Continent.  Pulmonary: LS clear throughout.  Pain: denies.     Drains: n/a  Skin: intact  Activity: Assist x 1 with GB/W.  Diet: regular with thin liquids. Takes pills whole with water.     Therapies recs: home with assistance or TCU  Discharge: pending continued workup

## 2022-03-10 NOTE — CONSULTS
Care Management Initial Consult    General Information  Assessment completed with: Patient,    Type of CM/SW Visit: Offer D/C Planning    Primary Care Provider verified and updated as needed:     Readmission within the last 30 days:        Reason for Consult: discharge planning  Advance Care Planning:            Communication Assessment  Patient's communication style: spoken language (English or Bilingual)    Hearing Difficulty or Deaf: no   Wear Glasses or Blind: no    Cognitive  Cognitive/Neuro/Behavioral: WDL  Level of Consciousness: alert  Arousal Level: opens eyes spontaneously  Orientation: oriented x 4  Mood/Behavior: calm, cooperative  Best Language: 0 - No aphasia  Speech: clear, spontaneous    Living Environment:   People in home: child(lazara), adult     Current living Arrangements:        Able to return to prior arrangements: yes       Family/Social Support:  Care provided by: self, child(lazara)  Provides care for: no one                Description of Support System:           Current Resources:   Patient receiving home care services: No     Community Resources: None  Equipment currently used at home: cane, straight, walker, standard  Supplies currently used at home:      Employment/Financial:  Employment Status: employed full-time     Employment/ Comments: currently on DANNY  Financial Concerns:             Lifestyle & Psychosocial Needs:  Social Determinants of Health     Tobacco Use: Low Risk      Smoking Tobacco Use: Never Smoker     Smokeless Tobacco Use: Never Used   Alcohol Use: Not on file   Financial Resource Strain: Not on file   Food Insecurity: Not on file   Transportation Needs: Not on file   Physical Activity: Not on file   Stress: Not on file   Social Connections: Not on file   Intimate Partner Violence: Not on file   Depression: Not at risk     PHQ-2 Score: 0   Housing Stability: Not on file       Functional Status:  Prior to admission patient needed assistance:              Mental  Health Status:          Chemical Dependency Status:                Values/Beliefs:  Spiritual, Cultural Beliefs, Buddhist Practices, Values that affect care:                 Additional Information:  Met with patient to discuss role in discharge planning. Pt lives indep in apartment with daughter.  Pt does work full time but is currently on a DANNY due to condition and is homebound.  Daughter is able to work from home and assist as needed.  Reviewed recommendation for HHC.  Pt in agreement with this plan.     Pt/family was provided with the Medicare Compare list for Home Care.  Discussed associated Medicare star ratings to assist with choice for referrals/discharge planning Yes    Education was given to pt/family that star ratings are updated/maintained by Medicare and can be reviewed by visiting www.medicare.gov Yes    Referral sent to [facility] via email.  Await confirmation if they can accept.   Pt would like follow up made with PCP.  Sent to [facility] to schedule.     CC to follow for discharge planning to home w/ HHC.  Daughter can transport at discharge.     Addendum 12:52.  Updated by [facility] that they can't accept due to commercial plan/capacity in area  CC tried other HHC agencies:  [facility]: At capacity for payer in this area  [facility]: At capacity in area  Every time HHC: Left message for intake  [facility]: Left message for intake  CC will continue to try other HHC agencies.  With [facility] commercial plan may have an issue getting HHC.     Addendum 1400:   [facility]: Does not take commericial insurance  [facility] [phone]: Reviewing.  Initial HHC info faxed to [phone]    Addendum 1535:  Received call from [facility] [phone] at [facility] that they can accept for start of care HH PT on 3/14.  Fax orders to [phone]    Uzma Olivas RN

## 2022-03-10 NOTE — PLAN OF CARE
Goal Outcome Evaluation:    Pt here with bilateral numbness to hands and feet. A&Ox4. Neuros intact x for numbness to extremities, weak hand grasps. VSS on RA. Tele sinus tachycardia. regular diet, thin liquids. Takes pills whole. Up with Ax1 GB. Reporting pain to lower back, ibuprofen given and heat pack placed. Pt scoring green on the Aggression Stop Light Tool. Psych consult placed. Discharge plan pending workup.

## 2022-03-10 NOTE — PROGRESS NOTES
Reviewed imaging myself as well as with Dr. Bates     No surgical intervention recommended at this time.   Agree with Neurology plans for further work up for treatable causes of neuropathy, rehab with PT and OT, psych eval due to ongoing stressor    Our team will sign off at this time. Please contact our team with any questions or concerns. Dr. Bates in agreement     Kiesha TOPETE [facility]  [address] [address]    Tel [phone]  Pager [phone]      EMG 2/23/22  Conclusion:     Axonal sensory-motor polyneuropathy.     Superimposed very severe left median neuropathy across the wrist (CTS),   with markedly prolonged latency and probably conduction block (given   reduced amplitude but with normal motor units).       No evidence of cervical or lumbosacral radiculopathies.      MRI brain 2/13/22  IMPRESSION:  1.  No evidence of acute infarction or other acute intracranial abnormality.  2.  Mild scattered T2-hyperintense white matter signal abnormalities are nonspecific but commonly reflect chronic small vessel ischemic change. Chronic migraine sequelae and demyelination could exhibit a similar appearance.  3.  Small bilateral mastoid effusions. Mastoiditis should be excluded clinically.  4.  Empty sella configuration. The finding is most often incidental. However, the finding can be seen in association with idiopathic intracranial hypertension among a subset of patients     MRI cervical spine 2/28/22  IMPRESSION  1.  Normal cervical and upper thoracic spinal cord.   2.  Diffuse idiopathic skeletal hyperostosis from C7 to upper T3   3.  Multilevel cervical spondylosis as outlined. Resulting moderate central canal stenosis at C5-6 and C6-7 with effacement of intrathecal CSF but no active cord compression or deformity. Additional mild central canal stenosis at C3-4, C4-5, and C7-T1.   4.  Patent neural foramina in the cervical spine.       MRI thoracic spine 2/28/22  IMPRESSION  1.  Normal thoracic spinal cord and conus medullaris.   2.  Mild dextroconvex thoracic scoliosis, minor multilevel lower thoracic spondylosis, without thoracic spinal stenosis.       MRI lumbar spine 2/28/22  IMPRESSION  1.  Mild dextroconvex lumbar spinal curvature with minimal retrolisthesis from L2-3 to L4-5. No associated spondylolysis.   2.  Mild multilevel lumbar spondylosis as outlined, without central canal stenosis in the lumbar spine.   3.  Mild right L5-S1 inferior foraminal stenosis from inferior foraminal disc herniation, without deformity of the exiting right L5 nerve root. Additional borderline L4-5 and L3-4 inferior foraminal stenosis bilaterally.      Glucose CSF: 68  Protein total CSF: 42

## 2022-03-10 NOTE — PLAN OF CARE
SLP: ST orders received, chart reviewed and completed thorough review and discussion with pt on dysphagia and recent medical events. Pt reported that she was unable to chew anything solid for the last month. She has been choosing softer foods. Main complaint in jaw, right sided facial tenderness, numbness, not necessarily pain. Symptoms are now improving and she can now tolerate a regular diet, open her mouth fully and return to sleeping on the right side of her face. Pt denied any s/sx of aspiration. Provided thorough review. Pt in agreement with no swallow concerns at this time.     Would consider OP ENT consult. She is also planning to see her Dentist. No further IP SLP needs at this time.

## 2022-03-10 NOTE — DISCHARGE INSTRUCTIONS
You will have Home Health PT from [facility]  They will contact you to coordinate.  Call [phone] if you have not heard from them.

## 2022-03-10 NOTE — PROGRESS NOTES
[facility]    Medicine Progress Note - Hospitalist Service    Date of Admission:  3/8/2022    Assessment & Plan            Jimmy Stevens is a 56 year old female admitted on 3/8/2022.  Past history of HTN and recent COVID infection 1/2022 who presents with progressive bilateral hand and feet numbness over the past 1 month.      Bilateral paresthesias of hands and feet  Etiology unclear.  Extensive prior work-up including MRI's of brain, lumbar and thoracic spine, EMG (see H&P for details).  EMG abnormal showing axonal sensorimotor polyneuropathy.  Spinal MRI's with areas of spondylosis and moderate central canal stenosis.   * LP at admission wnl  * ESR 37, CRP 9.3  * TSH, B12, folate wnl  * Lyme ab negative    - MMA, Vit B6 and E, paraneoplastic panel and SPEP pending  - Neurology and neurosurgery recs appreciated  - note RF in 2015 was mildly elevated but denies any history of RA and denies history of painful swollen joints - will await neuro input before ordering any RF or CAREY  - therapies recommend home with home therapies  - Neuro recommending Psych consult for depression, will order    HTN  - continue PTA lisinopril         Diet: Combination Diet Regular Diet Adult    DVT Prophylaxis: Pneumatic Compression Devices  Lau Catheter: Not present  Central Lines: None  Cardiac Monitoring: None  Code Status: Full Code      Disposition Plan   Expected Discharge: 03/11/2022     Anticipated discharge location:  Awaiting care coordination huddle  Delays:            The patient's care was discussed with the Bedside Nurse and Patient.    Juan Busby MD  Hospitalist Service  [facility]  Securely message with the Vocera Web Console (learn more here)  Text page via pMDsoft Paging/Directory         Clinically Significant Risk Factors Present on Admission              # Obesity: Estimated body mass index is 31.76 kg/m  as calculated from the following:    Height as of this  "encounter: 1.626 m (5' 4\").    Weight as of this encounter: 83.9 kg (185 lb).      ______________________________________________________________________    Interval History   She reports no change in symptoms, all remain stable.  She does endorse significant stress and wonders if that could be contributing to her symptoms.      Data reviewed today: I reviewed all medications, new labs and imaging results over the last 24 hours. I personally reviewed no images or EKG's today.    Physical Exam   Vital Signs: Temp: 97.8  F (36.6  C) Temp src: Oral BP: 112/69 Pulse: 107   Resp: 16 SpO2: 97 % O2 Device: None (Room air)    Weight: 185 lbs 0 oz     General Appearance: Well nourished female in NAD, lying in bed  Respiratory: lungs CTAB, no wheezes or crackles, no tachypnea   Cardiovascular: RRR, normal s1/s2 without murmur  GI: abdomen soft, normal bowel sounds  Skin: no peripheral edema   Other: Alert and appropriate, cranial nerves grossly intact     Data   Recent Labs   Lab 03/09/22 0819 03/08/22 1956   WBC 9.7 9.6   HGB 12.3 12.8   MCV 84 87    340    139   POTASSIUM 4.0 4.2   CHLORIDE 105 107   CO2 27 27   BUN 24 25   CR 0.85 1.04   ANIONGAP 6 5   RAI 9.1 9.3   * 120*   ALBUMIN  --  3.5   PROTTOTAL  --  7.7   BILITOTAL  --  0.3   ALKPHOS  --  59   ALT  --  22   AST  --  7     "

## 2022-03-11 ENCOUNTER — APPOINTMENT (OUTPATIENT)
Dept: PHYSICAL THERAPY | Facility: CLINIC | Age: 57
End: 2022-03-11
Payer: COMMERCIAL

## 2022-03-11 ENCOUNTER — TELEPHONE (OUTPATIENT)
Dept: INTERNAL MEDICINE | Facility: CLINIC | Age: 57
End: 2022-03-11
Payer: COMMERCIAL

## 2022-03-11 ENCOUNTER — MYC MEDICAL ADVICE (OUTPATIENT)
Dept: INTERNAL MEDICINE | Facility: CLINIC | Age: 57
End: 2022-03-11
Payer: COMMERCIAL

## 2022-03-11 ENCOUNTER — APPOINTMENT (OUTPATIENT)
Dept: OCCUPATIONAL THERAPY | Facility: CLINIC | Age: 57
End: 2022-03-11
Payer: COMMERCIAL

## 2022-03-11 VITALS
HEIGHT: 64 IN | HEART RATE: 104 BPM | OXYGEN SATURATION: 98 % | WEIGHT: 185 LBS | BODY MASS INDEX: 31.58 KG/M2 | TEMPERATURE: 98.1 F | RESPIRATION RATE: 18 BRPM | SYSTOLIC BLOOD PRESSURE: 139 MMHG | DIASTOLIC BLOOD PRESSURE: 96 MMHG

## 2022-03-11 LAB — TOTAL PROTEIN SERUM FOR ELP: 7.2 G/DL (ref 6.8–8.8)

## 2022-03-11 PROCEDURE — 250N000013 HC RX MED GY IP 250 OP 250 PS 637: Performed by: INTERNAL MEDICINE

## 2022-03-11 PROCEDURE — 97110 THERAPEUTIC EXERCISES: CPT | Mod: GO | Performed by: OCCUPATIONAL THERAPIST

## 2022-03-11 PROCEDURE — 84155 ASSAY OF PROTEIN SERUM: CPT | Performed by: HOSPITALIST

## 2022-03-11 PROCEDURE — 97116 GAIT TRAINING THERAPY: CPT | Mod: GP | Performed by: PHYSICAL THERAPIST

## 2022-03-11 PROCEDURE — 97530 THERAPEUTIC ACTIVITIES: CPT | Mod: GP | Performed by: PHYSICAL THERAPIST

## 2022-03-11 PROCEDURE — 36415 COLL VENOUS BLD VENIPUNCTURE: CPT | Performed by: HOSPITALIST

## 2022-03-11 PROCEDURE — 84165 PROTEIN E-PHORESIS SERUM: CPT | Mod: TC | Performed by: PATHOLOGY

## 2022-03-11 PROCEDURE — G0378 HOSPITAL OBSERVATION PER HR: HCPCS

## 2022-03-11 PROCEDURE — 99217 PR OBSERVATION CARE DISCHARGE: CPT | Performed by: HOSPITALIST

## 2022-03-11 RX ADMIN — LISINOPRIL 20 MG: 20 TABLET ORAL at 09:08

## 2022-03-11 RX ADMIN — GABAPENTIN 300 MG: 300 CAPSULE ORAL at 15:55

## 2022-03-11 RX ADMIN — GABAPENTIN 300 MG: 300 CAPSULE ORAL at 09:08

## 2022-03-11 RX ADMIN — IBUPROFEN 400 MG: 200 TABLET, FILM COATED ORAL at 17:30

## 2022-03-11 RX ADMIN — IBUPROFEN 400 MG: 200 TABLET, FILM COATED ORAL at 04:36

## 2022-03-11 ASSESSMENT — ACTIVITIES OF DAILY LIVING (ADL)
ADLS_ACUITY_SCORE: 5
ADLS_ACUITY_SCORE: 5
ADLS_ACUITY_SCORE: 9
ADLS_ACUITY_SCORE: 7
ADLS_ACUITY_SCORE: 5
ADLS_ACUITY_SCORE: 7
ADLS_ACUITY_SCORE: 9
ADLS_ACUITY_SCORE: 7
ADLS_ACUITY_SCORE: 9
ADLS_ACUITY_SCORE: 5
ADLS_ACUITY_SCORE: 9
ADLS_ACUITY_SCORE: 5

## 2022-03-11 NOTE — PLAN OF CARE
Pt here with Bilateral numbness in the hands and feet. A&Ox4. Neuros intact ex for numbness in extremities. Weak hand grasps. VSS. Tele ST. Regular diet, thin liquids. Takes pills whole. Up with A1GBW.  Pt scoring green on the Aggression Stop Light Tool. Plan Keep S<180.  lower back pain managed with Ibuprofen and heat packs. Psych consult placed. Discharge possibly home today with family.

## 2022-03-11 NOTE — PROGRESS NOTES
Pt is AOx4, VSS on RA and denies pain at this time. Shower completed, A1, regular diet. CMS in tact. Discharge pending.

## 2022-03-11 NOTE — DISCHARGE SUMMARY
[facility]  Hospitalist Discharge Summary      Date of Admission:  3/8/2022  Date of Discharge:  3/11/2022  Discharging Provider: Juan Busby MD  Discharge Service: Hospitalist Service    Discharge Diagnoses   Neuropathy with bilateral paresthesias of hands and feet  HTN    Follow-ups Needed After Discharge   Follow-up Appointments     Follow-up and recommended labs and tests       Follow up with primary care provider, Nick Calderon, within 7 days for   hospital follow- up.  No follow up labs or test are needed.  Follow up with [facility] in 1-2 weeks.             Unresulted Labs Ordered in the Past 30 Days of this Admission     Date and Time Order Name Status Description    3/11/2022 12:56 PM Protein Electrophoresis, Serum In process     3/11/2022 12:56 PM Total Protein, Serum for ELP In process     3/9/2022  5:35 PM Paraneoplastic antibody In process     3/9/2022  5:35 PM Vitamin B6 In process     3/9/2022  5:35 PM Vitamin E In process     3/9/2022  3:00 PM Methylmalonic Acid In process     3/8/2022  7:42 PM Cerebrospinal fluid Aerobic Bacterial Culture Routine Preliminary       These results will be followed up by: Neurology    Discharge Disposition   Discharged to home  Condition at discharge: Stable    Hospital Course              Jimmy Stevens is a 56 year old female admitted on 3/8/2022.  Past history of HTN and recent COVID infection 1/2022 who presents with progressive bilateral hand and feet numbness over the past 1 month.      Neuropathy with bilateral paresthesias of hands and feet  Etiology unclear, neurology consulted.  Extensive prior work-up including MRI's of brain, lumbar and thoracic spine, EMG (see H&P for details).  EMG abnormal showing axonal sensorimotor polyneuropathy.  Spinal MRI's with areas of spondylosis and moderate central canal stenosis.   * Neurosurgery consulted and reviewed current and past MRI's, do not recommend any surgical  intervention  * note RF in 2015 was mildly elevated but denies any history of RA and denies history of painful swollen joints   * LP at admission wnl  * ESR 37, CRP 9.3  * TSH, B12, folate wnl  * Lyme ab negative  * Psych consulted, felt her mood was consisting with normal grieving, did not recommend medication or further mental health follow up as patient was not interested in this    - Serum immunofixation with possible faint monoclonal IgG immunoglobulin of lambda light chain type  - MMA, Vit B6 and E, paraneoplastic panel, SPEP pending  - follow up with [facility] in 1-2 weeks  - PT/OT recommend home with home therapies    HTN  - continue PTA lisinopril        Consultations This Hospital Stay   NEUROLOGY IP CONSULT  NEUROSURGERY IP CONSULT  PHYSICAL THERAPY ADULT IP CONSULT  OCCUPATIONAL THERAPY ADULT IP CONSULT  SPEECH LANGUAGE PATH ADULT IP CONSULT  CARE MANAGEMENT / SOCIAL WORK IP CONSULT  PSYCHIATRY IP CONSULT    Code Status   Full Code    Time Spent on this Encounter   I, Juan Busby MD, personally saw the patient today and spent greater than 30 minutes discharging this patient.       Juan Busby MD  [facility]  [address] [place] [address] [place] [address]  Phone: [phone]  ______________________________________________________________________    Physical Exam   Vital Signs: Temp: 98.1  F (36.7  C) Temp src: Oral BP: (!) 139/96 Pulse: 104   Resp: 18 SpO2: 98 % O2 Device: None (Room air)    Weight: 185 lbs 0 oz  General Appearance: Well nourished female in NAD  Respiratory: lungs CTAB, no wheezes or crackles, no tachypnea   Cardiovascular: RRR, normal s1/s2 without murmur  GI: abdomen soft, normal bowel sounds  Skin: no peripheral edema   Other: Alert and appropriate, cranial nerves grossly intact        Primary Care Physician   Nick Calderon    Discharge Orders      Home Care Referral      Reason for your hospital stay    You were admitted for  numbness/tingling and weakness, the cause of which remains unclear, but multiple laboratory tests are in process.     Follow-up and recommended labs and tests     Follow up with primary care provider, Nick Calderon, within 7 days for hospital follow- up.  No follow up labs or test are needed.  Follow up with [facility] in 1-2 weeks.     Activity    Your activity upon discharge: activity as tolerated     Walker Order for DME - ONLY FOR DME    DME Documentation:   Describe the reason for need to support medical necessity: Impaired gait.     I, the undersigned, certify that the above prescribed supplies are medically necessary for this patient and is both reasonable and necessary in reference to accepted standards of medical and necessary in reference to accepted standards of medical practice in the treatment of this patient's condition and is not prescribed as a convenience.     Diet    Follow this diet upon discharge: Regular Diet Adult       Significant Results and Procedures   Most Recent 3 CBC's:Recent Labs   Lab Test 03/09/22 0819 03/08/22 1956 02/13/22  1652   WBC 9.7 9.6 8.8   HGB 12.3 12.8 14.0   MCV 84 87 84    340 330     Most Recent 3 BMP's:Recent Labs   Lab Test 03/09/22 0819 03/08/22 1956 02/13/22  1652    139 139   POTASSIUM 4.0 4.2 3.4   CHLORIDE 105 107 106   CO2 27 27 30   BUN 24 25 13   CR 0.85 1.04 0.76   ANIONGAP 6 5 3   RAI 9.1 9.3 8.9   * 120* 121*     Most Recent TSH and T4:Recent Labs   Lab Test 03/09/22  1515   TSH 1.94     Most Recent ESR & CRP:Recent Labs   Lab Test 03/08/22 1956   SED 37*   CRP 9.3*   ,   Results for orders placed or performed during the hospital encounter of 02/13/22   MR Brain w/o & w Contrast    Narrative    EXAM: MR BRAIN W/O and W CONTRAST  LOCATION: [facility]  DATE/TIME: 2/13/2022 5:29 PM    INDICATION: Paresthesias, hand weakness.  COMPARISON: CT 10/17/2016.  CONTRAST: 10 mL Gadavist.  TECHNIQUE:  Routine multiplanar multisequence head MRI without and with intravenous contrast.    FINDINGS:  INTRACRANIAL CONTENTS: No acute or subacute infarct. No mass, acute hemorrhage, or extra-axial fluid collections. Scattered nonspecific T2/FLAIR hyperintensities within the cerebral white matter. Age-appropriate ventricles and sulci. Normal position of   the cerebellar tonsils. No pathologic contrast enhancement. The major intracranial flow voids are unremarkable.    SELLA: Empty sella morphology with flattening of the pituitary gland along the sellar floor.    OSSEOUS STRUCTURES/SOFT TISSUES: Unremarkable marrow signal. Unremarkable extracranial soft tissues. Limited images of the upper cervical spine demonstrate no acute abnormality.      ORBITS: No abnormality accounting for technique.     SINUSES/MASTOIDS: No paranasal sinus mucosal disease. Scattered fluid/membrane thickening in the mastoid air cells bilaterally.       Impression    IMPRESSION:  1.  No evidence of acute infarction or other acute intracranial abnormality.  2.  Mild scattered T2-hyperintense white matter signal abnormalities are nonspecific but commonly reflect chronic small vessel ischemic change. Chronic migraine sequelae and demyelination could exhibit a similar appearance.  3.  Small bilateral mastoid effusions. Mastoiditis should be excluded clinically.  4.  Empty sella configuration. The finding is most often incidental. However, the finding can be seen in association with idiopathic intracranial hypertension among a subset of patients   XR Chest 2 Views    Narrative    EXAM: XR CHEST 2 VW  LOCATION: [facility]  DATE/TIME: 2/13/2022 7:30 PM    INDICATION: Shortness of breath.  COMPARISON: 10/17/2016.      Impression    IMPRESSION: Mild torsion aorta. Heart size upper limits of normal. Pulmonary venous hypertension without CHF.       Discharge Medications   Current Discharge Medication List      CONTINUE these medications  which have NOT CHANGED    Details   gabapentin (NEURONTIN) 300 MG capsule Take 1 capsule (300 mg) by mouth 3 times daily  Qty: 30 capsule, Refills: 0      ibuprofen (ADVIL/MOTRIN) 200 MG tablet Take 400 mg by mouth every 6 hours as needed for mild pain      Acetaminophen (TYLENOL PO) Take by mouth every 6 hours as needed for mild pain or fever      lisinopril (ZESTRIL) 20 MG tablet TAKE 1 TABLET BY MOUTH TWICE A DAY  Qty: 180 tablet, Refills: 1    Associated Diagnoses: Essential hypertension, benign           Allergies   Allergies   Allergen Reactions     H2 Antagonists      Mylan (itching)     Minocycline

## 2022-03-11 NOTE — CONSULTS
Triage and Transition - Consult and Liaison     Jimmy Stevens  March 11, 2022    Session start: 0920  Session end: 0946  Session duration in minutes: 26 minutes  CPT utilized: 74778 - Brief diagnostic assessment (modifier 52)  Patient was seen virtually (K[employer]ebone cart or other teleconferencing device).    Reason for consult: Psychiatry consult was requested due to care team concerns for depression and anxiety. Appears that Ms. Stevens may discharge today therefore possible goals may including arrangement of outpatient mental health support. Patient was seen by [facility] Consult & Liaison team.     Identifying information: Ms. Jimmy Stevens is a 56-year-old female who has now been seen in the emergency room four times in the last month without a significant past medical history other than a fairly new diagnosis of hypertension, as well as a recovered COVID in January of this year.       Approximately one month ago, the patient began feeling numbness in her hands to the point where I would touch her fingers and her thumb together and would not feel anything.  This progressed to the point where she felt like she was extra clumsy.  She works at a bank and often counts money and she was having difficulties doing that.  Curiously, several days after that, she also developed numbness in her feet, which at times is actually admitted even difficult for her to walk.  She has had extensive neurologic evaluation including multiple imaging studies including her spine, as well as her brain.  The brain MRIs have been largely normal.  She has had various abnormalities, which will be clarified below in her spinal MRIs and she has also had a grossly abnormal EMG.       The patient was actually seen in neurology clinic today and seen by Dr. Shafer and given her ongoing symptoms, it was recommended she come back to the emergency room today for a lumbar puncture or concern of Guillain-[place] syndrome.  The patient was evaluated in the  "emergency room by Dr. Siddiqui.  Her evaluation was largely normal from a laboratory perspective and an LP was obtained, which was also largely normal with a glucose of 68 and a protein of 42.  Given lack of diagnosis and failure of outpatient management, admission was requested for further observation and management.    Presenting problem (including symptoms, strengths risks, resources used): Patient reports symptoms of \"I just get sad somes dear\". In individual therapeutic contact with patient today, patient presents with teraful at times but able to use humor appropriately affect, normal mood.. Safety concerns are not present today for SI, NSSIB or HI.     Mental Status Exam   Affect: Other: tearful (however appropriate)  Appearance: Appropriate   Attention Span/Concentration: Attentive    Eye Contact: Engaged  Fund of Knowledge: Appropriate   Language /Speech Content: Fluent  Language /Speech Volume: Normal   Language /Speech Rate/Productions: Normal   Recent Memory: Intact  Remote Memory: Intact  Mood: Sad   Orientation:   Person: Yes   Place: Yes  Time of Day: Yes   Date: Yes   Situation (Do they understand why they are here?): Yes   Psychomotor Behavior: Normal   Thought Content: Clear  Thought Form: Intact    Current medications:   Current Facility-Administered Medications   Medication     gabapentin (NEURONTIN) capsule 300 mg     hydrALAZINE (APRESOLINE) injection 10 mg     ibuprofen (ADVIL/MOTRIN) tablet 400 mg     lidocaine (LMX4) cream     lidocaine 1 % 0.1-1 mL     lisinopril (ZESTRIL) tablet 20 mg     magnesium hydroxide (MILK OF MAGNESIA) suspension 30 mL     melatonin tablet 1 mg     ondansetron (ZOFRAN-ODT) ODT tab 4 mg    Or     ondansetron (ZOFRAN) injection 4 mg     prochlorperazine (COMPAZINE) injection 10 mg    Or     prochlorperazine (COMPAZINE) tablet 10 mg    Or     prochlorperazine (COMPAZINE) suppository 25 mg     senna-docusate (SENOKOT-S/PERICOLACE) 8.6-50 MG per tablet 1 tablet    Or     " "senna-docusate (SENOKOT-S/PERICOLACE) 8.6-50 MG per tablet 2 tablet     sodium chloride (PF) 0.9% PF flush 3 mL     sodium chloride (PF) 0.9% PF flush 3 mL     Relevant history: Ms. Stevens lives independently in [place], [place]. She has a daughter. Her father and spouse recently passed away. Her daughter became ill and required various medical care and then her OSWALDO \"left her one week after her surgery\".  She reports that her mood at times is down and reports this to be what she feels is normal bereavement. She denies a history of mental health or SUDs diagnoses.  She has been employed at the bank 5 minutes from her house for 20 years and works part time at [employer]. She reports she finds strength and support from her daughter and her mother. She practices meditation, exercise, imagery (sitting by the lake and watching boats and animals) prayer and distraction by work and friends.  Reports she has many friends from work and that she find strength from them as well.  Her goal is to become independent again and get back to work as soon as possible.     Cultural Influences: Ms. Stevens reports that he bhavik with her grief and sadness through her cultural practices for meditation and prayer.  She reports overall she is an \"upbeat person\" and the various negative events in her life can make her tearful at times but she does not wish for medication or therapy interventions.     Therapeutic intervention and progress:  Therapeutic intervention consisted of building therapeutic rapport, active listening and validation. Patient is making progress towards treatment goals as evidenced by participation in assessment and goal to discharge home later today.     Collateral information:   Reviewed chart.    Diagnosis:   Adjustment Disorders  309.9 (F43.20) Unspecified, by history;    Plan:     Continue care coordination with care team.      Maintain current transition plan.     No further mental health care recommended. Ms. Stevens " does not wish for outpatient therapy or medications at this time. She can follow up with her PCP or Neurology if she changes her mind after discharge.     Patient will not continue to be followed by this service.    ELLIE REYES  Triage and Transition - Consult and Liaison   [phone]

## 2022-03-11 NOTE — PROGRESS NOTES
Care Management Discharge Note    Discharge Date: 03/11/2022       Discharge Disposition: Home Care through [facility]    Discharge Transportation: family or friend will provide  Persons Notified of Discharge Plans: ISABELLE faxed discharge orders to [facility] at [phone]. ISABELLE also called Lis with [facility] at [phone] to verify that discharge orders were faxed and that pt is discharging this evening.     Patient/Family in Agreement with the Plan: yes    Handoff Referral Completed:     Additional Information:  Prema at [facility] indicated that start of care olivia be on Monday 3/14/22    LUISITO Otto  [facility]  Care Transitions  [phone]

## 2022-03-11 NOTE — TELEPHONE ENCOUNTER
Home Care PT / OT called to request delay in start of care orders.  Patient discharged 3/11/2022.  Home Care will be able to see patient on 3/14/73944    Bren Mckeon RN

## 2022-03-11 NOTE — PLAN OF CARE
Pt here with neuropathy and paresthesis in BUE and BLE. A/Ox4. Neuropathy in all extremities. Generalized weakness. A1/GB/W. Continent. Follow up with neurology outpatient. Discharging home with walker.

## 2022-03-12 DIAGNOSIS — Z71.89 OTHER SPECIFIED COUNSELING: ICD-10-CM

## 2022-03-12 LAB
A-TOCOPHEROL VIT E SERPL-MCNC: 7.9 MG/L
BETA+GAMMA TOCOPHEROL SERPL-MCNC: 1.8 MG/L

## 2022-03-12 NOTE — CARE PLAN
Occupational Therapy Discharge Summary    Reason for therapy discharge:    Discharged to home with home therapy.    Progress towards therapy goal(s). See goals on Care Plan in [facility] electronic health record for goal details.  Goals partially met.  Barriers to achieving goals:   discharge from facility.    Therapy recommendation(s):    Continued therapy is recommended.  Rationale/Recommendations:  Continued OT to maximize independence and safety with I/ADLs.

## 2022-03-12 NOTE — PLAN OF CARE
Physical Therapy Discharge Summary    Reason for therapy discharge:    Discharged to home with home therapy.    Progress towards therapy goal(s). See goals on Care Plan in [facility] electronic health record for goal details.  Goals not met.  Barriers to achieving goals:   discharge from facility.    Therapy recommendation(s):    Continued therapy is recommended.  Rationale/Recommendations:  to maximize functional independence and tolerance of household mobility.    Goal Outcome Evaluation:

## 2022-03-13 LAB
BACTERIA CSF CULT: NO GROWTH
GRAM STAIN RESULT: NORMAL
GRAM STAIN RESULT: NORMAL

## 2022-03-14 ENCOUNTER — PATIENT OUTREACH (OUTPATIENT)
Dept: CARE COORDINATION | Facility: CLINIC | Age: 57
End: 2022-03-14
Payer: COMMERCIAL

## 2022-03-14 DIAGNOSIS — G62.9 NEUROPATHY: Primary | ICD-10-CM

## 2022-03-14 LAB
ALBUMIN SERPL ELPH-MCNC: 3.8 G/DL (ref 3.7–5.1)
ALPHA1 GLOB SERPL ELPH-MCNC: 0.3 G/DL (ref 0.2–0.4)
ALPHA2 GLOB SERPL ELPH-MCNC: 0.8 G/DL (ref 0.5–0.9)
B-GLOBULIN SERPL ELPH-MCNC: 1 G/DL (ref 0.6–1)
GAMMA GLOB SERPL ELPH-MCNC: 1.3 G/DL (ref 0.7–1.6)
M PROTEIN SERPL ELPH-MCNC: 0.1 G/DL
PROT PATTERN SERPL ELPH-IMP: ABNORMAL

## 2022-03-14 PROCEDURE — 84165 PROTEIN E-PHORESIS SERUM: CPT | Mod: 26 | Performed by: PATHOLOGY

## 2022-03-14 RX ORDER — GABAPENTIN 300 MG/1
300 CAPSULE ORAL 3 TIMES DAILY
Qty: 30 CAPSULE | Refills: 0 | Status: SHIPPED | OUTPATIENT
Start: 2022-03-14 | End: 2022-03-23

## 2022-03-14 NOTE — TELEPHONE ENCOUNTER
Controlled Substance Refill Request for: gabapentin (NEURONTIN) 300 MG capsule  Problem List Complete:  No     PROVIDER TO CONSIDER COMPLETION OF PROBLEM LIST AND OVERVIEW/CONTROLLED SUBSTANCE AGREEMENT    Last Written Prescription Date: 2/13/22  Last Fill Quantity: 30,   # refills: 0     THE MOST RECENT OFFICE VISIT MUST BE WITHIN THE PAST 3 MONTHS. AT LEAST ONE FACE TO FACE VISIT MUST OCCUR EVERY 6 MONTHS. ADDITIONAL VISITS CAN BE VIRTUAL.  (THIS STATEMENT SHOULD BE DELETED.)    Last Office Visit with [facility] primary care provider: 2/17/22    Future Office visit:     Appointments in Next Year    Mar 23, 2022  9:00 AM  (Arrive by 8:45 AM)  ED/Hospital Follow Up with AMANDA Sahu CNP  [facility] ([facility] ) [phone]        Controlled substance agreement:   CSA -- Encounter Level:    CSA: None found at the encounter level.     CSA -- Patient Level:    CSA: None found at the patient level.       Last Urine Drug Screen: No results found for: CDAUT, No results found for: COMDAT, No results found for: THC13, PCP13, COC13, MAMP13, OPI13, AMP13, BZO13, TCA13, MTD13, BAR13, OXY13, PPX13, BUP13     Processing:  Rx to be electronically transmitted to pharmacy by provider

## 2022-03-14 NOTE — TELEPHONE ENCOUNTER
Patient Contact    Attempt # 1    Was call answered?  No.  Left detailed message. Advised to call clinic back with any questions.     Cate Staton RN

## 2022-03-14 NOTE — PROGRESS NOTES
"Clinic Care Coordination Contact  [facility]: Post-Discharge Note  SITUATION                                                      Admission:    Admission Date: 03/08/22   Reason for Admission: Neuropathy  Discharge:   Discharge Date: 03/11/22  Discharge Diagnosis: Neuropathy    BACKGROUND                                                      Per hospital discharge summary and inpatient provider notes:  Jimmy Stevens is a 56 year old female admitted on 3/8/2022.  Past history of HTN and recent COVID infection 1/2022 who presents with progressive bilateral hand and feet numbness over the past 1 month.        Neuropathy with bilateral paresthesias of hands and feet  Etiology unclear, neurology consulted.  Extensive prior work-up including MRI's of brain, lumbar and thoracic spine, EMG (see H&P for details).  EMG abnormal showing axonal sensorimotor polyneuropathy.  Spinal MRI's with areas of spondylosis and moderate central canal stenosis.   * Neurosurgery consulted and reviewed current and past MRI's, do not recommend any surgical intervention  * note RF in 2015 was mildly elevated but denies any history of RA and denies history of painful swollen joints   * LP at admission wnl  * ESR 37, CRP 9.3  * TSH, B12, folate wnl  * Lyme ab negative  * Psych consulted, felt her mood was consisting with normal grieving, did not recommend medication or further mental health follow up as patient was not interested in this     - Serum immunofixation with possible faint monoclonal IgG immunoglobulin of lambda light chain type  - MMA, Vit B6 and E, paraneoplastic panel, SPEP pending  - follow up with [facility] in 1-2 weeks  - PT/OT recommend home with home therapies     HTN  - continue PTA lisinopril       ASSESSMENT      Enrollment  Primary Care Care Coordination Status: Declined    Discharge Assessment  How are you doing now that you are home?: \" Little better bben up walking alittle with a walker " "but tingle in still there and I get tired \"  How are your symptoms? (Red Flag symptoms escalate to triage hotline per guidelines): Improved;Unchanged  Do you feel your condition is stable enough to be safe at home until your provider visit?: Yes  Does the patient have their discharge instructions? : Yes  Does the patient have questions regarding their discharge instructions? : No  Were you started on any new medications or were there changes to any of your previous medications? : No  Does the patient have all of their medications?: No (see comment) (Been out of her gabapentin message sent to care team for refill)  Do you have questions regarding any of your medications? : No  Do you have all of your needed medical supplies or equipment (DME)?  (i.e. oxygen tank, CPAP, cane, etc.): Yes  Discharge follow-up appointment scheduled within 14 calendar days? : Yes  Discharge Follow Up Appointment Date: 03/23/22  Discharge Follow Up Appointment Scheduled with?: Primary Care Provider    Post-op ([facility] CTA Only)  If the patient had a surgery or procedure, do they have any questions for a nurse?: No             PLAN                                                      Outpatient Plan:     Follow up with primary care provider, Nick Calderon, within 7 days for hospital follow- up.  No follow up labs or test are needed.  Follow up with [facility] in 1-2 weeks.          Activity     Your activity upon discharge: activity as tolerated          Walker Order for DME          Future Appointments   Date Time Provider Department Center   3/23/2022  9:00 AM Stefanie Pantoja APRN CNP [facility]         For any urgent concerns, please contact our 24 hour nurse triage line: [phone] ([phone])         Liza Lima MA                "

## 2022-03-15 ENCOUNTER — TELEPHONE (OUTPATIENT)
Dept: INTERNAL MEDICINE | Facility: CLINIC | Age: 57
End: 2022-03-15
Payer: COMMERCIAL

## 2022-03-15 LAB — METHYLMALONATE SERPL-SCNC: 0.31 UMOL/L (ref 0–0.4)

## 2022-03-15 NOTE — TELEPHONE ENCOUNTER
Home care called     Requesting continue PT orders:   2x/2 weeks   Once per week for 4 weeks     Recently hospitalized for neuropathy/unknown etiology in hands and feet having trouble walking/balance    Appointments in Next Year    Mar 23, 2022  9:00 AM  (Arrive by 8:45 AM)  ED/Hospital Follow Up with AMANDA Sahu CNP  [facility] ([facility] ) [phone]        Verbal given on orders     Susy PALOMINO Triage RN  [facility]

## 2022-03-16 LAB
AMPHIPHYSIN IGG TITR SER IF: NEGATIVE TITER
CV2 IGG TITR SER IF: NEGATIVE TITER
GLIAL NUC TYPE 1 IGG TITR SER IF: NEGATIVE TITER
HU1 IGG TITR SER IF: NEGATIVE TITER
HU2 IGG TITR SER IF: NEGATIVE TITER
HU3 IGG TITR SER IF: NEGATIVE TITER
LABORATORY COMMENT REPORT: NORMAL
NACHR AB SER IA-SCNC: 0 NMOL/L
PARANEOPLASTIC AB SER-IMP: NORMAL
PCA-1 IGG TITR SER IF: NEGATIVE TITER
PCA-2 IGG TITR SER IF: NEGATIVE TITER
PCA-TR IGG TITR SER IF: NEGATIVE TITER
VGCC-P/Q BIND IGG+IGM SER IA-SCNC: 0 NMOL/L
VGKC AB SER IA-SCNC: 0 NMOL/L

## 2022-03-17 LAB — PYRIDOXAL PHOS SERPL-SCNC: 28.6 NMOL/L

## 2022-03-23 ENCOUNTER — OFFICE VISIT (OUTPATIENT)
Dept: INTERNAL MEDICINE | Facility: CLINIC | Age: 57
End: 2022-03-23
Payer: COMMERCIAL

## 2022-03-23 VITALS
OXYGEN SATURATION: 98 % | TEMPERATURE: 97.2 F | WEIGHT: 203 LBS | HEIGHT: 64 IN | DIASTOLIC BLOOD PRESSURE: 96 MMHG | HEART RATE: 101 BPM | BODY MASS INDEX: 34.66 KG/M2 | SYSTOLIC BLOOD PRESSURE: 130 MMHG

## 2022-03-23 DIAGNOSIS — R77.8 ABNORMAL SERUM PROTEIN ELECTROPHORESIS: ICD-10-CM

## 2022-03-23 DIAGNOSIS — G62.9 NEUROPATHY: Primary | ICD-10-CM

## 2022-03-23 PROCEDURE — 36415 COLL VENOUS BLD VENIPUNCTURE: CPT | Performed by: NURSE PRACTITIONER

## 2022-03-23 PROCEDURE — 86335 IMMUNFIX E-PHORSIS/URINE/CSF: CPT | Performed by: PATHOLOGY

## 2022-03-23 PROCEDURE — 86334 IMMUNOFIX E-PHORESIS SERUM: CPT | Performed by: PATHOLOGY

## 2022-03-23 PROCEDURE — 99495 TRANSJ CARE MGMT MOD F2F 14D: CPT | Performed by: NURSE PRACTITIONER

## 2022-03-23 RX ORDER — GABAPENTIN 300 MG/1
600 CAPSULE ORAL 3 TIMES DAILY
Qty: 180 CAPSULE | Refills: 1 | Status: SHIPPED | OUTPATIENT
Start: 2022-03-23 | End: 2024-04-23

## 2022-03-23 RX ORDER — METHOCARBAMOL 750 MG/1
750 TABLET, FILM COATED ORAL 3 TIMES DAILY PRN
Qty: 30 TABLET | Refills: 1 | Status: SHIPPED | OUTPATIENT
Start: 2022-03-23 | End: 2022-04-26

## 2022-03-23 NOTE — PROGRESS NOTES
Assessment & Plan     Neuropathy  - Recently admitted from the neurology clinic due to ongoing neuropathy, weakness, concern for possible Guillain-Barré.  Admitted for an LP and further work-up from clinic.  Previous EMG showed abnormal axonal, sensorimotor polyneuropathy.  Neurology was consulted as well as Neurosurgery.  Etiology of her polyneuropathy is not entirely clear at this time.  Was evaluated by neurology and neurosurgery with no surgical intervention recommendations.  Neurology recommended ongoing PT, OT and to consider psychiatric consult for her mood.  Psychiatry was consulted during her hospital stay and did not feel that medication or further follow-up was needed as patient was felt to be appropriately grieving her losses over the past year.  - Her LP was unrevealing as were other labs Other than SPEP showing possible, very small monoclonal spike; elevated ESR, CRP  - Will get Serum immunofixation and urine immunofixation today  - Refilled gabapentin-this dose was recently increased to 600 mg 3 times daily, patient is tolerating well, continue  - Patient is requesting a refill of Robaxin as she reports that this was helpful also in managing her pain.  This was refilled  - Has Home PT/OT - continue  - Follow up with Neurology  - Follow up with PCP in 1 month  - Follow urine immunofixation and serum immunofixation  - Protein Immunofixation Serum  - Protein immunofixation urine  - gabapentin (NEURONTIN) 300 MG capsule  Dispense: 180 capsule; Refill: 1  - methocarbamol (ROBAXIN) 750 MG tablet  Dispense: 30 tablet; Refill: 1  - Protein Immunofixation Serum  - Protein immunofixation urine    Abnormal serum protein electrophoresis  - Additional labs obtained  - Protein Immunofixation Serum  - Protein immunofixation urine  - Protein Immunofixation Serum  - Protein immunofixation urine           BMI:   Estimated body mass index is 34.84 kg/m  as calculated from the following:    Height as of this encounter:  "1.626 m (5' 4\").    Weight as of this encounter: 92.1 kg (203 lb).       See Patient Instructions    Return in about 4 weeks (around 4/20/2022) for follow up.    AMANDA Sahu CNP  [facility] CARMINA Marquez is a 56 year old who presents for the following health issues      HPI     Post Discharge Outreach 3/14/2022   Admission Date 3/8/2022   Reason for Admission Neuropathy   Discharge Date 3/11/2022   Discharge Diagnosis Neuropathy   How are you doing now that you are home? \" Little better bben up walking alittle with a walker but tingle in still there and I get tired \"   How are your symptoms? (Red Flag symptoms escalate to triage hotline per guidelines) Improved;Unchanged   Do you feel your condition is stable enough to be safe at home until your provider visit? Yes   Does the patient have their discharge instructions?  Yes   Does the patient have questions regarding their discharge instructions?  No   Were you started on any new medications or were there changes to any of your previous medications?  No   Does the patient have all of their medications? No (see comment)   Do you have questions regarding any of your medications?  No   Do you have all of your needed medical supplies or equipment (DME)?  (i.e. oxygen tank, CPAP, cane, etc.) Yes   Discharge follow-up appointment scheduled within 14 calendar days?  Yes   Discharge Follow Up Appointment Date 3/23/2022   Discharge Follow Up Appointment Scheduled with? Primary Care Provider     Hospital Follow-up Visit:    Hospital/Nursing Home/IP Rehab Facility: [facility]  Date of Admission: 3-8-22  Date of Discharge: 3-11-22  Reason(s) for Admission: neuropathy and general weakness      Was your hospitalization related to COVID-19? No   Problems taking medications regularly:  None  Medication changes since discharge: no  Problems adhering to non-medication therapy:  None    Summary of " hospitalization:  [employer] discharge summary reviewed  Diagnostic Tests/Treatments reviewed.  Follow up needed: none  Other Healthcare Providers Involved in Patient s Care:         None  Update since discharge: improved.       Post Discharge Medication Reconciliation: discharge medications reconciled, continue medications without change.  Plan of care communicated with patient              Hospital discharge follow-up:  Neuropathy with bilateral paresthesias of hands and feet:  Patient is seen in clinic today for a follow-up hospital discharge.  Has been having issues with numbness and tingling of her hands and feet and had been seen by Neurology on the day of admission and was advised admission per primary Neurologist for ongoing work-up and an LP for work-up of Guillain-Barré.  Neurology and Neurosurgery were consulted during her hospital stay.  Per Neurosurgery, no surgical intervention recommended.  Neurology was consulted and recommended ongoing intense physical therapy and Occupational Therapy, in addition to consideration of psychiatry evaluation and management given significant depression and stressors in the family.    Per chart review, extensive work-up includin.  MRI of the brain on 2022, impression:  No evidence of acute infarction or other acute intracranial abnormality.  Mild scattered T2 hyperdense white matter signal abnormalities are nonspecific, reflect chronic small vessel ischemic change, chronic migraines.  The quality and demyelination.  Could exhibited similar experience empty sella configuration.  Finding is most often incidental.     2.  EMG performed on 2022, conclusion:  Axonal sensorimotor polyneuropathy.  Superimposed very severe left median neuropathy across the wrist with markedly prolonged latency and probably conduction block, given reduced amplitude.  No evidence of cervical or lumbosacral radiculopathy use.     3.  MRI of the lumbar spine read on  02/28/2022, impression:  Mild dextroconvex lumbar spinal curvature with minimal retrolisthesis from L2-L3 to L4-L5.  Mild multilevel lumbar spondylosis as outlined without central canal stenosis in the lumbar spine.  Mild right L5-S1 inferior foraminal stenosis from inferior foraminal disk herniation without deformity of the exiting right L5 nerve root.  Additional borderline L4-5 and L3-L4 inferior foraminal stenosis bilaterally.     4.  MRI of the thoracic spine, this was performed on 02/28/2022, impression:  Normal cervical and upper thoracic spinal cord.  Diffuse idiopathic skeletal hyperostosis from C7 to upper T3 multilevel cervical spondylosis, as outlined, resulting in moderate central canal stenosis at C5-C6 and C6-C7 with effacement of the intrathecal CSF, but no active cord compression or deformity.  Patent neural foramina in the cervical spine.    -Labs were also reviewed.  Was noted to have a possible mild monoclonal spike on her serum electrophoresis.  Other labs including a paraneoplastic panel were normal.  LP was unrevealing.      Today, the patient reports that her symptoms started around February 7.  States that while she was at work she was feeling chilled throughout her whole body.  She states that she was working on her computer when she could not get in, stating that there was an error message. She realized that she was not pressing the keyboard hard enough.  She also reports that day she made a cup of tea and it fell out of her hand.  She reports that she has had longstanding history of numbness of her left lower back, radiating to her buttock and to her left knee.  This has been going on for a couple of years.  She also notes that she has been having these chilled, cold sensations for the past 2 to 2.5 years.    She reports that her symptoms are largely unchanged since she has been discharged.  Not any better, not any worse.  She still has numbness and tingling of her bilateral hands and her  "toes bilaterally.  She also reports some numbness and tingling of her calves with her ankles being spared.  She has difficulty walking due to balance issues.  She has difficulty picking things up and using her hands due to the numbness and tingling.  She does have pain, describing this as a burning sensation and occasionally a shooting pain.  Her gabapentin was recently increased to 600 mg 3 times daily and reports that has helped a little bit.  She is requesting a refill of Robaxin as she reports that this previously was quite helpful in helping her relax.  She reports that she is currently working with home PT and OT on her strength.  She is tearful at times and is worried given her ongoing symptoms and inability to work.  She states that her daughter has to help her get ready for the day with dressing and grooming.  States she is a woman who always enjoyed putting on make-up and getting dressed up for her day but is unable to do this at this time due to her weakness and neuropathy symptoms.    HTN:  Recently diagnosed with hypertension.  Blood pressure has been improving but is mildly above goal.  She is currently maintained on lisinopril 20 mg daily.      Review of Systems   CONSTITUTIONAL:NEGATIVE for fever, chills, change in weight  INTEGUMENTARY/SKIN: NEGATIVE for worrisome rashes, moles or lesions  EYES: NEGATIVE for vision changes or irritation  RESP:NEGATIVE for significant cough or SOB  CV: NEGATIVE for chest pain, palpitations or peripheral edema  MUSCULOSKELETAL: POSITIVE  for generalized weakness, using a cane or a walker  NEURO: POSITIVE for numbness or tingling of bilateral hands and toes to forefoot, general weakness  PSYCHIATRIC: Is sad regarding loss of her ability to care for herself and worried about recovery      Objective    BP (!) 130/96   Pulse 101   Temp 97.2  F (36.2  C) (Tympanic)   Ht 1.626 m (5' 4\")   Wt 92.1 kg (203 lb)   LMP  (LMP Unknown)   SpO2 98%   Breastfeeding No   BMI " 34.84 kg/m    Body mass index is 34.84 kg/m .     Physical Exam   GENERAL APPEARANCE: alert and no distress  EYES: Eyes grossly normal to inspection, PERRL and conjunctivae and sclerae normal  RESP: lungs clear to auscultation - no rales, rhonchi or wheezes  CV: regular rates and rhythm, normal S1 S2, no S3 or S4 and no murmur, click or rub  MS: Generalized weakness, left greater than  SKIN: no suspicious lesions or rashes  NEURO: Generalized weakness in all extremities, weak hand grasps, weak dorsi plantarflexion's, left is weaker than right.  Gait is slow particularly to get up and moving.  Using a cane.  Alert and oriented x3 and appropriately interactive  PSYCH: mentation appears normal and tearful at times, worried about outcome

## 2022-03-24 ENCOUNTER — TELEPHONE (OUTPATIENT)
Dept: INTERNAL MEDICINE | Facility: CLINIC | Age: 57
End: 2022-03-24
Payer: COMMERCIAL

## 2022-03-24 DIAGNOSIS — G62.9 NEUROPATHY: Primary | ICD-10-CM

## 2022-03-24 DIAGNOSIS — R77.8 ABNORMAL SERUM PROTEIN ELECTROPHORESIS: ICD-10-CM

## 2022-03-24 LAB
PROT ELPH PNL UR ELPH: NORMAL
PROT PATTERN SERPL IFE-IMP: NORMAL

## 2022-03-24 NOTE — TELEPHONE ENCOUNTER
"Call to patient re: protein, urine immunofixation showing \"Very small monoclonal IgG immunoglobulin of lambda light chain type. Pathologic significance requires clinical correlation.\"  -Referral to Hematology placed  -Discussed with patient, who agrees with plan  AMANDA Sahu CNP    "

## 2022-03-25 ENCOUNTER — PATIENT OUTREACH (OUTPATIENT)
Dept: ONCOLOGY | Facility: CLINIC | Age: 57
End: 2022-03-25
Payer: COMMERCIAL

## 2022-03-25 NOTE — PROGRESS NOTES
"Referral to hematology and pertinent labs reviewed. LVM for pt re: callback number and that our intake scheduling will call pt in next 1-2 business days to schedule consult  Referred By  Referred To   Stefanie Pantoja APRN CNP   [address]   [facility] [address]   Phone: [phone]   Fax: [phone]    Diagnoses: Neuropathy   Abnormal serum protein electrophoresis   Order: Adult Oncology/Hematology Referral   \" small monoclonal spike on SPEP, UPEP, significant neuropathy \" Medical Oncology  Preferred Location:  Any [facility] Location     Cate Hinton, RN, BSN, OCN  Hematology/Oncology Nurse Navigator  [facility]  [phone]    "

## 2022-03-28 DIAGNOSIS — Z53.9 DIAGNOSIS NOT YET DEFINED: Primary | ICD-10-CM

## 2022-03-28 PROCEDURE — G0180 MD CERTIFICATION HHA PATIENT: HCPCS | Performed by: INTERNAL MEDICINE

## 2022-03-30 ENCOUNTER — MEDICAL CORRESPONDENCE (OUTPATIENT)
Dept: HEALTH INFORMATION MANAGEMENT | Facility: CLINIC | Age: 57
End: 2022-03-30
Payer: COMMERCIAL

## 2022-04-07 ENCOUNTER — MEDICAL CORRESPONDENCE (OUTPATIENT)
Dept: HEALTH INFORMATION MANAGEMENT | Facility: CLINIC | Age: 57
End: 2022-04-07
Payer: COMMERCIAL

## 2022-04-08 NOTE — PROGRESS NOTES
RECORDS STATUS - ALL OTHER DIAGNOSIS      RECORDS RECEIVED FROM: [facility]   DATE RECEIVED: 5/4/2022   NOTES STATUS DETAILS   OFFICE NOTE from referring provider Complete [facility]   Stefanie Pantoja APRN CNP   DISCHARGE SUMMARY from hospital Complete 3/8/2022 Neuropathy    DISCHARGE REPORT from the ER Complete 3/8/2022 Neuropathy    MEDICATION LIST Complete [facility]   CLINICAL TRIAL TREATMENTS TO DATE     LABS     PATHOLOGY REPORTS     ANYTHING RELATED TO DIAGNOSIS Complete Labs last updated on 3/23/2022   GENONOMIC TESTING     TYPE:     IMAGING (NEED IMAGES & REPORT)     CT SCANS     MRI Complete MRI Spine Thoracic 2/27/2022   MAMMO     ULTRASOUND     PET

## 2022-04-20 ENCOUNTER — TELEPHONE (OUTPATIENT)
Dept: ONCOLOGY | Facility: CLINIC | Age: 57
End: 2022-04-20
Payer: COMMERCIAL

## 2022-04-20 NOTE — TELEPHONE ENCOUNTER
I called the patient and left a voicemail for the patient to call back and reschedule her appointment with Dr. Ortega to a different provider per the clinic IB. If patient calls back there are openings in May at the [place] location.

## 2022-04-26 ENCOUNTER — OFFICE VISIT (OUTPATIENT)
Dept: INTERNAL MEDICINE | Facility: CLINIC | Age: 57
End: 2022-04-26
Payer: COMMERCIAL

## 2022-04-26 VITALS
TEMPERATURE: 97.2 F | SYSTOLIC BLOOD PRESSURE: 124 MMHG | WEIGHT: 199.6 LBS | DIASTOLIC BLOOD PRESSURE: 74 MMHG | BODY MASS INDEX: 34.26 KG/M2 | OXYGEN SATURATION: 98 % | HEART RATE: 100 BPM | RESPIRATION RATE: 15 BRPM

## 2022-04-26 DIAGNOSIS — M54.50 CHRONIC BILATERAL LOW BACK PAIN WITHOUT SCIATICA: ICD-10-CM

## 2022-04-26 DIAGNOSIS — Z12.31 VISIT FOR SCREENING MAMMOGRAM: ICD-10-CM

## 2022-04-26 DIAGNOSIS — G62.9 NEUROPATHY: Primary | ICD-10-CM

## 2022-04-26 DIAGNOSIS — E66.01 MORBID OBESITY (H): ICD-10-CM

## 2022-04-26 DIAGNOSIS — I10 ESSENTIAL HYPERTENSION, BENIGN: ICD-10-CM

## 2022-04-26 DIAGNOSIS — Z12.4 CERVICAL CANCER SCREENING: ICD-10-CM

## 2022-04-26 DIAGNOSIS — Z12.11 SCREEN FOR COLON CANCER: ICD-10-CM

## 2022-04-26 DIAGNOSIS — G89.29 CHRONIC BILATERAL LOW BACK PAIN WITHOUT SCIATICA: ICD-10-CM

## 2022-04-26 PROCEDURE — 99214 OFFICE O/P EST MOD 30 MIN: CPT | Performed by: INTERNAL MEDICINE

## 2022-04-26 RX ORDER — METHOCARBAMOL 750 MG/1
750 TABLET, FILM COATED ORAL 2 TIMES DAILY PRN
Qty: 60 TABLET | Refills: 1 | Status: SHIPPED | OUTPATIENT
Start: 2022-04-26 | End: 2023-02-06

## 2022-04-26 RX ORDER — GABAPENTIN 300 MG/1
600 CAPSULE ORAL 3 TIMES DAILY
Qty: 180 CAPSULE | Refills: 1 | Status: CANCELLED | OUTPATIENT
Start: 2022-04-26

## 2022-04-26 NOTE — PROGRESS NOTES
"  Assessment & Plan     Neuropathy  Continue with current medical support.  We will not be aging current medication.  Patient is scheduled to follow-up with neurology and hematology for evaluation of cause and source for her symptoms.  Suggest she see me after these clinic visits  - methocarbamol (ROBAXIN) 750 MG tablet; Take 1 tablet (750 mg) by mouth 2 times daily as needed for muscle spasms    Essential hypertension, benign  Stable and continuing with current medical management.    Obesity (BMI 35.0-39.9) with comorbidity (H)  Encouraged ongoing weight loss and weight reduction    Chronic bilateral low back pain without sciatica  Acute on chronic exacerbations noted.    Visit for screening mammogram  Advised updated mammography.  - *MA Screening Digital Bilateral; Future    Screen for colon cancer  Recommended again colonoscopy but patient has deferred.  Suggest FIT testing  - Fecal colorectal cancer screen FIT - Future (S+30); Future    Cervical cancer screening  Noted prior hysterectomy     BMI:   Estimated body mass index is 34.26 kg/m  as calculated from the following:    Height as of 3/23/22: 1.626 m (5' 4\").    Weight as of this encounter: 90.5 kg (199 lb 9.6 oz).   Weight management plan: Discussed healthy diet and exercise guidelines    See Patient Instructions    Return in about 3 months (around 7/26/2022) for Routine Visit.    Nick Calderon MD  [facility]        Subjective :    Jimmy is a 56 year old who presents for the following health issues  accompanied by her self.    Patient is here today for follow-up after recent hospitalization for atypical paresthesias, question neuropathy.  Etiology at this point somewhat unclear.  Patient following up in neurology clinic.  Noted also recent hematology referral for small monoclonal spike noted.    Patient was once again advised that she should consider updating her mammogram, colonoscopy as well as Pap smear.  Latter " of which has been deferred due to her prior hysterectomy.    She states she did get her Covid vaccination although not documented.    She is using a cane for ambulation.  For many years she has had a longstanding history of multiple somatic complaints as well as back pain and discomfort as well as numerous psychosocial stressors.    She now is primarily having issues with numbness and tingling in her hands and feet.  She is using a cane for ambulation.  She reports complaining of cramping to her left-sided mid low back and buttock area and intermittent burning pain on the left upper and lower back and buttock area.  She reports difficulty with standing for long periods of time.  She reports generalized weakness in the setting of a weak handgrip.  She is hopeful of going back to work as able but does report difficulty with cramping and dysfunction of her hands at times.  She informs me that she has been giving a disability parking sticker through the end of August.    States she is using her gabapentin and muscle relaxant with a positive impact.    Previous EMG showed abnormal axonal, sensorimotor polyneuropathy. Neurology was consulted as well as Neurosurgery.  Etiology of her polyneuropathy is not entirely clear at this time.  Was evaluated by neurology and neurosurgery with no surgical intervention recommendations.  Neurology recommended ongoing PT, OT and to consider psychiatric consult for her mood. Psychiatry was consulted during her hospital stay and did not feel that medication or further follow-up was needed as patient was felt to be appropriately grieving her losses over the past year.    HPI     Review of Systems   CONSTITUTIONAL: NEGATIVE for fever, chills, change in weight  EYES: NEGATIVE for vision changes or irritation  ENT/MOUTH: NEGATIVE for ear, mouth and throat problems  RESP: NEGATIVE for significant cough or SOB  CV: NEGATIVE for chest pain, palpitations  GI: NEGATIVE for nausea, abdominal pain,  heartburn, or change in bowel habits  : NEGATIVE for frequency, dysuria, or hematuria  HEME: NEGATIVE for bleeding problems      Objective    /74   Pulse 100   Temp 97.2  F (36.2  C) (Temporal)   Resp 15   Wt 90.5 kg (199 lb 9.6 oz)   LMP  (LMP Unknown)   SpO2 98%   BMI 34.26 kg/m    Body mass index is 34.26 kg/m .\    Physical Exam   GENERAL: alert and no distress using a cane  EYES: Eyes grossly normal to inspection, PERRL and conjunctivae and sclerae normal  HENT: ear canals and TM's normal, nose and mouth without ulcers or lesions  NECK: no adenopathy, no asymmetry, masses, or scars and thyroid normal to palpation  RESP: lungs clear to auscultation - no rales, rhonchi or wheezes  CV: regular rate and rhythm, normal S1 S2, no S3 or S4, no click or rub  MS: no gross musculoskeletal defects noted  A cane is being used.  Her gait is antalgic.  Her gait is slightly wide-based.  Rapid alternating movements are slightly slowed.  She demonstrates a slight decreased  strength noted bilaterally left greater than right that appears relatively similar to recent neurological notes.  She is able to arise from a seated chair with the use of a cane.  There is no foot drop.  PSYCH: concentration poor and affect flat

## 2022-05-02 ENCOUNTER — ANCILLARY PROCEDURE (OUTPATIENT)
Dept: MAMMOGRAPHY | Facility: CLINIC | Age: 57
End: 2022-05-02
Attending: INTERNAL MEDICINE
Payer: COMMERCIAL

## 2022-05-02 DIAGNOSIS — Z12.31 VISIT FOR SCREENING MAMMOGRAM: ICD-10-CM

## 2022-05-02 PROCEDURE — 77067 SCR MAMMO BI INCL CAD: CPT | Mod: TC | Performed by: RADIOLOGY

## 2022-05-04 ENCOUNTER — PRE VISIT (OUTPATIENT)
Dept: ONCOLOGY | Facility: CLINIC | Age: 57
End: 2022-05-04

## 2022-05-10 NOTE — PROGRESS NOTES
RECORDS STATUS - ALL OTHER DIAGNOSIS      RECORDS RECEIVED FROM: [facility]   DATE RECEIVED: 6/15/2022   NOTES STATUS DETAILS   OFFICE NOTE from referring provider Complete [facility]   Stefanie Pantoja APRN CNP   DISCHARGE SUMMARY from hospital     DISCHARGE REPORT from the ER     MEDICATION LIST Complete [facility]   CLINICAL TRIAL TREATMENTS TO DATE     LABS     PATHOLOGY REPORTS     ANYTHING RELATED TO DIAGNOSIS Complete Labs last updated on 4/26/2022    GENONOMIC TESTING     TYPE:     IMAGING (NEED IMAGES & REPORT)     CT SCANS     MRI Complete MRI Spine Cervical 2/27/2022   MAMMO Complete 5/2/2022   ULTRASOUND     PET

## 2022-06-12 ENCOUNTER — HEALTH MAINTENANCE LETTER (OUTPATIENT)
Age: 57
End: 2022-06-12

## 2022-06-15 ENCOUNTER — PRE VISIT (OUTPATIENT)
Dept: ONCOLOGY | Facility: CLINIC | Age: 57
End: 2022-06-15

## 2022-06-15 ENCOUNTER — ONCOLOGY VISIT (OUTPATIENT)
Dept: ONCOLOGY | Facility: CLINIC | Age: 57
End: 2022-06-15
Attending: NURSE PRACTITIONER
Payer: COMMERCIAL

## 2022-06-15 VITALS
BODY MASS INDEX: 33.86 KG/M2 | HEIGHT: 64 IN | HEART RATE: 105 BPM | DIASTOLIC BLOOD PRESSURE: 89 MMHG | SYSTOLIC BLOOD PRESSURE: 138 MMHG | WEIGHT: 198.3 LBS | RESPIRATION RATE: 16 BRPM | OXYGEN SATURATION: 98 %

## 2022-06-15 DIAGNOSIS — G62.9 NEUROPATHY: ICD-10-CM

## 2022-06-15 DIAGNOSIS — D47.2 MGUS (MONOCLONAL GAMMOPATHY OF UNKNOWN SIGNIFICANCE): Primary | ICD-10-CM

## 2022-06-15 DIAGNOSIS — R77.8 ABNORMAL SERUM PROTEIN ELECTROPHORESIS: ICD-10-CM

## 2022-06-15 LAB
ANION GAP SERPL CALCULATED.3IONS-SCNC: 3 MMOL/L (ref 3–14)
BUN SERPL-MCNC: 16 MG/DL (ref 7–30)
CALCIUM SERPL-MCNC: 9.3 MG/DL (ref 8.5–10.1)
CHLORIDE BLD-SCNC: 106 MMOL/L (ref 94–109)
CO2 SERPL-SCNC: 29 MMOL/L (ref 20–32)
CREAT SERPL-MCNC: 0.88 MG/DL (ref 0.52–1.04)
ERYTHROCYTE [DISTWIDTH] IN BLOOD BY AUTOMATED COUNT: 12.7 % (ref 10–15)
GFR SERPL CREATININE-BSD FRML MDRD: 77 ML/MIN/1.73M2
GLUCOSE BLD-MCNC: 93 MG/DL (ref 70–99)
HCT VFR BLD AUTO: 41 % (ref 35–47)
HGB BLD-MCNC: 13.4 G/DL (ref 11.7–15.7)
MCH RBC QN AUTO: 28.3 PG (ref 26.5–33)
MCHC RBC AUTO-ENTMCNC: 32.7 G/DL (ref 31.5–36.5)
MCV RBC AUTO: 87 FL (ref 78–100)
PLATELET # BLD AUTO: 351 10E3/UL (ref 150–450)
POTASSIUM BLD-SCNC: 4.1 MMOL/L (ref 3.4–5.3)
RBC # BLD AUTO: 4.73 10E6/UL (ref 3.8–5.2)
SODIUM SERPL-SCNC: 138 MMOL/L (ref 133–144)
TOTAL PROTEIN SERUM FOR ELP: 7.7 G/DL (ref 6.8–8.8)
WBC # BLD AUTO: 8.8 10E3/UL (ref 4–11)

## 2022-06-15 PROCEDURE — 36415 COLL VENOUS BLD VENIPUNCTURE: CPT | Performed by: INTERNAL MEDICINE

## 2022-06-15 PROCEDURE — 84165 PROTEIN E-PHORESIS SERUM: CPT | Mod: TC | Performed by: PATHOLOGY

## 2022-06-15 PROCEDURE — 82435 ASSAY OF BLOOD CHLORIDE: CPT | Performed by: INTERNAL MEDICINE

## 2022-06-15 PROCEDURE — 83521 IG LIGHT CHAINS FREE EACH: CPT | Mod: 59 | Performed by: INTERNAL MEDICINE

## 2022-06-15 PROCEDURE — 85027 COMPLETE CBC AUTOMATED: CPT | Performed by: INTERNAL MEDICINE

## 2022-06-15 PROCEDURE — G0463 HOSPITAL OUTPT CLINIC VISIT: HCPCS

## 2022-06-15 PROCEDURE — 84155 ASSAY OF PROTEIN SERUM: CPT | Performed by: INTERNAL MEDICINE

## 2022-06-15 PROCEDURE — 82784 ASSAY IGA/IGD/IGG/IGM EACH: CPT | Performed by: INTERNAL MEDICINE

## 2022-06-15 PROCEDURE — 99204 OFFICE O/P NEW MOD 45 MIN: CPT | Performed by: INTERNAL MEDICINE

## 2022-06-15 ASSESSMENT — PAIN SCALES - GENERAL: PAINLEVEL: SEVERE PAIN (6)

## 2022-06-15 NOTE — LETTER
"    6/15/2022         RE: Jimmy Stevens  [address]  [facility] [address]      Oncology Rooming Note    Wilda 15, 2022 1:21 PM   Jimmy Stevens is a 56 year old female who presents for:    Chief Complaint   Patient presents with     Oncology Clinic Visit     Initial Vitals: /89   Pulse 105   Resp 16   Wt 89.9 kg (198 lb 4.8 oz)   LMP  (LMP Unknown)   SpO2 98%   BMI 34.04 kg/m   Estimated body mass index is 34.04 kg/m  as calculated from the following:    Height as of 3/23/22: 1.626 m (5' 4\").    Weight as of this encounter: 89.9 kg (198 lb 4.8 oz). Body surface area is 2.01 meters squared.  Severe Pain (6) Comment: Data Unavailable   No LMP recorded (lmp unknown). Patient has had a hysterectomy.  Allergies reviewed: Yes  Medications reviewed: Yes    Medications: Medication refills not needed today.  Pharmacy name entered into [employer]: [facility] #[id number] - [facility] [address] AT [facility] [address]    Clinical concerns:  doctor was notified.      Grisel Pitt, [facility]              This consult has been requested by AMANDA Sahu, CNP, for monoclonal gammopathy     Ms. Stevens is a 56-year-old female with peripheral neuropathy.  The patient follows up with Neurology.  Over the last few months, she had multiple investigations done and was found to have monoclonal gammopathy.  Hematology consult is requested for that.  Investigations are reviewed and summarized below.  1.  On 03/08/2022:    -Normal CBC.  -Normal CMP.  2.  On 03/09/2022:    -Normal vitamin B2.  -Normal TSH.  -Normal MMA.  -Normal folate.  -Serum immunofixation reveals possible faint monoclonal IgG lambda.  3.  On 03/11/2022, SPEP reveals monoclonal peak of 0.1.  4.  On 03/23/2022:    -Serum immunofixation reveals very small monoclonal IgG lambda.  -Urine immunofixation reveals very small monoclonal IgG lambda.     The patient denies any personal history of cancer.  No family history of " myeloma or any hematologic malignancy.     The patient continues to have neuropathic symptoms.  The patient has numbness and tingling in the fingers and toes.  The patient has muscle weakness.  She also has some back pain.  She is following up with neurologist.     REVIEW OF SYSTEMS:  No headache.  No dizziness.  No chest pain. No shortness of breath.  No abdominal pain, nausea or vomiting.  No urinary or bowel complaints.  No abnormal bleeding.     All other review of systems negative.     MEDICATIONS:  Reviewed.     PAST MEDICAL HISTORY:     1.  Peripheral neuropathy.  2.  Hypertension.  3.  Newly diagnosed sleep apnea.  4.  Total abdominal hysterectomy.  5.  Breast biopsy, which was benign.     SOCIAL HISTORY:    -No smoking.  -No alcohol use.     PHYSICAL ASSESSMENT:    GENERAL:  She is alert, oriented x 3.  Not in any distress.  VITAL SIGNS:  Reviewed.     Rest of the systems not examined.     LABS:  Reviewed.     ASSESSMENT:     1.  A 56-year-old female with monoclonal gammopathy of undetermined significance (MGUS).  2.  Peripheral neuropathy.  3.  Other medical problems including hypertension and sleep apnea.     RECOMMENDATION:     1.  I had a long discussion with the patient and her daughter.  Investigations were all reviewed with them.     The patient has MGUS.  Difference with MGUS and myeloma discussed.  Natural history of MGUS discussed.  It can progress to myeloma at a rate of 1% per year.     I reassured the patient that at this time she does not have myeloma.  I am not suspecting any cancer. I explained to her that MGUS is not causing any problem.     2.  We will continue to monitor her.  Today, we will get some labs including CBC, BMP, SPEP, IgG and free light chain.     I explained to her that for MGUS, I will see her once a year with labs.  She is agreeable for it.     3.  The patient had few questions, which were all answered.  I advised her to call us with any questions or concerns.  I advised  her to see a physician if she has worsening weakness, bone pain, unexplained weight loss, recurrent infection or any other concerns.     Thanks for the consult.    ADDENDUM: Labs from today (06/15/2022):  -M-spike of 0.2.  -Kappa FLC of 2.70, lambda FLC of 1.85 and ratio of 1.46.  -IgG of 1322.     Total visit time of 45 minutes.  Time spent in today's visit, review of chart/investigations and documentation today.     Ronen Ortega MD       Visit Date: 06/15/2022    This consult has been requested by Stefanie Pantoja, APRN, CNP, for monoclonal gammopathy     Ms. Stevens is a 56-year-old female with peripheral neuropathy.  The patient follows up with Neurology.  Over the last few months, she had multiple investigations done and was found to have monoclonal gammopathy.  Hematology consult is requested for that.  Investigations are reviewed and summarized below.  1.  On 03/08/2022:    -Normal CBC.  -Normal CMP.  2.  On 03/09/2022:    -Normal vitamin B2.  -Normal TSH.  -Normal MMA.  -Normal folate.  -Serum immunofixation reveals possible faint monoclonal IgG lambda.  3.  On 03/11/2022, SPEP reveals monoclonal peak of 0.1.  4.  On 03/23/2022:    -Serum immunofixation reveals very small monoclonal IgG lambda.  -Urine immunofixation reveals very small monoclonal IgG lambda.     The patient denies any personal history of cancer.  No family history of myeloma or any hematologic malignancy.     The patient continues to have neuropathic symptoms.  The patient has numbness and tingling in the fingers and toes.  The patient has muscle weakness.  She also has some back pain.  She is following up with neurologist.     REVIEW OF SYSTEMS:  No headache.  No dizziness.  No chest pain. No shortness of breath.  No abdominal pain, nausea or vomiting.  No urinary or bowel complaints.  No abnormal bleeding.     All other review of systems negative.     MEDICATIONS:  Reviewed.     PAST MEDICAL HISTORY:     1.  Peripheral  neuropathy.  2.  Hypertension.  3.  Newly diagnosed sleep apnea.  4.  Total abdominal hysterectomy.  5.  Breast biopsy, which was benign.     SOCIAL HISTORY:    -No smoking.  -No alcohol use.     PHYSICAL ASSESSMENT:    GENERAL:  She is alert, oriented x 3.  Not in any distress.  VITAL SIGNS:  Reviewed.     Rest of the systems not examined.     LABS:  Reviewed.     ASSESSMENT:     1.  A 56-year-old female with monoclonal gammopathy of undetermined significance (MGUS).  2.  Peripheral neuropathy.  3.  Other medical problems including hypertension and sleep apnea.     RECOMMENDATION:     1.  I had a long discussion with the patient and her daughter.  Investigations were all reviewed with them.     The patient has MGUS.  Difference with MGUS and myeloma discussed.  Natural history of MGUS discussed.  It can progress to myeloma at a rate of 1% per year.     I reassured the patient that at this time she does not have myeloma.  I am not suspecting any cancer. I explained to her that MGUS is not causing any problem.     2.  We will continue to monitor her.  Today, we will get some labs including CBC, BMP, SPEP, IgG and free light chain.     I explained to her that for MGUS, I will see her once a year with labs.  She is agreeable for it.     3.  The patient had few questions, which were all answered.  I advised her to call us with any questions or concerns.  I advised her to see a physician if she has worsening weakness, bone pain, unexplained weight loss, recurrent infection or any other concerns.     Thanks for the consult.    ADDENDUM: Labs from today (06/15/2022):  -M-spike of 0.2.  -Kappa FLC of 2.70, lambda FLC of 1.85 and ratio of 1.46.  -IgG of 1322.     Total visit time of 45 minutes.  Time spent in today's visit, review of chart/investigations and documentation today.     MD Ronen Russell MD

## 2022-06-15 NOTE — LETTER
"    6/15/2022         RE: Jimmy Stevens  [address]  [facility] [address]        Dear Colleague,    Thank you for referring your patient, Jimmy Stevens, to the [facility]. Please see a copy of my visit note below.    Oncology Rooming Note    Wilda 15, 2022 1:21 PM   Jimmy Stevens is a 56 year old female who presents for:    Chief Complaint   Patient presents with     Oncology Clinic Visit     Initial Vitals: /89   Pulse 105   Resp 16   Wt 89.9 kg (198 lb 4.8 oz)   LMP  (LMP Unknown)   SpO2 98%   BMI 34.04 kg/m   Estimated body mass index is 34.04 kg/m  as calculated from the following:    Height as of 3/23/22: 1.626 m (5' 4\").    Weight as of this encounter: 89.9 kg (198 lb 4.8 oz). Body surface area is 2.01 meters squared.  Severe Pain (6) Comment: Data Unavailable   No LMP recorded (lmp unknown). Patient has had a hysterectomy.  Allergies reviewed: Yes  Medications reviewed: Yes    Medications: Medication refills not needed today.  Pharmacy name entered into [employer]: [facility] #[id number] - [facility] [address] [place] [facility] [address]    Clinical concerns:  doctor was notified.      Grisel Pitt, JALEN              This consult has been requested by AMANDA Sahu, CNP, for monoclonal gammopathy     Ms. Stevens is a 56-year-old female with peripheral neuropathy.  The patient follows up with Neurology.  Over the last few months, she had multiple investigations done and was found to have monoclonal gammopathy.  Hematology consult is requested for that.  Investigations are reviewed and summarized below.  1.  On 03/08/2022:    -Normal CBC.  -Normal CMP.  2.  On 03/09/2022:    -Normal vitamin B2.  -Normal TSH.  -Normal MMA.  -Normal folate.  -Serum immunofixation reveals possible faint monoclonal IgG lambda.  3.  On 03/11/2022, SPEP reveals monoclonal peak of 0.1.  4.  On 03/23/2022:    -Serum immunofixation reveals very " small monoclonal IgG lambda.  -Urine immunofixation reveals very small monoclonal IgG lambda.     The patient denies any personal history of cancer.  No family history of myeloma or any hematologic malignancy.     The patient continues to have neuropathic symptoms.  The patient has numbness and tingling in the fingers and toes.  The patient has muscle weakness.  She also has some back pain.  She is following up with neurologist.     REVIEW OF SYSTEMS:  No headache.  No dizziness.  No chest pain. No shortness of breath.  No abdominal pain, nausea or vomiting.  No urinary or bowel complaints.  No abnormal bleeding.     All other review of systems negative.     MEDICATIONS:  Reviewed.     PAST MEDICAL HISTORY:     1.  Peripheral neuropathy.  2.  Hypertension.  3.  Newly diagnosed sleep apnea.  4.  Total abdominal hysterectomy.  5.  Breast biopsy, which was benign.     SOCIAL HISTORY:    -No smoking.  -No alcohol use.     PHYSICAL ASSESSMENT:    GENERAL:  She is alert, oriented x 3.  Not in any distress.  VITAL SIGNS:  Reviewed.     Rest of the systems not examined.     LABS:  Reviewed.     ASSESSMENT:     1.  A 56-year-old female with monoclonal gammopathy of undetermined significance (MGUS).  2.  Peripheral neuropathy.  3.  Other medical problems including hypertension and sleep apnea.     RECOMMENDATION:     1.  I had a long discussion with the patient and her daughter.  Investigations were all reviewed with them.     The patient has MGUS.  Difference with MGUS and myeloma discussed.  Natural history of MGUS discussed.  It can progress to myeloma at a rate of 1% per year.     I reassured the patient that at this time she does not have myeloma.  I am not suspecting any cancer. I explained to her that MGUS is not causing any problem.     2.  We will continue to monitor her.  Today, we will get some labs including CBC, BMP, SPEP, IgG and free light chain.     I explained to her that for MGUS, I will see her once a year  with labs.  She is agreeable for it.     3.  The patient had few questions, which were all answered.  I advised her to call us with any questions or concerns.  I advised her to see a physician if she has worsening weakness, bone pain, unexplained weight loss, recurrent infection or any other concerns.     Thanks for the consult.    ADDENDUM: Labs from today (06/15/2022):  -M-spike of 0.2.  -Kappa FLC of 2.70, lambda FLC of 1.85 and ratio of 1.46.  -IgG of 1322.     Total visit time of 45 minutes.  Time spent in today's visit, review of chart/investigations and documentation today.     Ronen Ortega MD       Visit Date: 06/15/2022    This consult has been requested by AMANDA Sahu, CNP, for monoclonal gammopathy     Ms. Stevens is a 56-year-old female with peripheral neuropathy.  The patient follows up with Neurology.  Over the last few months, she had multiple investigations done and was found to have monoclonal gammopathy.  Hematology consult is requested for that.  Investigations are reviewed and summarized below.  1.  On 03/08/2022:    -Normal CBC.  -Normal CMP.  2.  On 03/09/2022:    -Normal vitamin B2.  -Normal TSH.  -Normal MMA.  -Normal folate.  -Serum immunofixation reveals possible faint monoclonal IgG lambda.  3.  On 03/11/2022, SPEP reveals monoclonal peak of 0.1.  4.  On 03/23/2022:    -Serum immunofixation reveals very small monoclonal IgG lambda.  -Urine immunofixation reveals very small monoclonal IgG lambda.     The patient denies any personal history of cancer.  No family history of myeloma or any hematologic malignancy.     The patient continues to have neuropathic symptoms.  The patient has numbness and tingling in the fingers and toes.  The patient has muscle weakness.  She also has some back pain.  She is following up with neurologist.     REVIEW OF SYSTEMS:  No headache.  No dizziness.  No chest pain. No shortness of breath.  No abdominal pain, nausea or vomiting.  No urinary or  bowel complaints.  No abnormal bleeding.     All other review of systems negative.     MEDICATIONS:  Reviewed.     PAST MEDICAL HISTORY:     1.  Peripheral neuropathy.  2.  Hypertension.  3.  Newly diagnosed sleep apnea.  4.  Total abdominal hysterectomy.  5.  Breast biopsy, which was benign.     SOCIAL HISTORY:    -No smoking.  -No alcohol use.     PHYSICAL ASSESSMENT:    GENERAL:  She is alert, oriented x 3.  Not in any distress.  VITAL SIGNS:  Reviewed.     Rest of the systems not examined.     LABS:  Reviewed.     ASSESSMENT:     1.  A 56-year-old female with monoclonal gammopathy of undetermined significance (MGUS).  2.  Peripheral neuropathy.  3.  Other medical problems including hypertension and sleep apnea.     RECOMMENDATION:     1.  I had a long discussion with the patient and her daughter.  Investigations were all reviewed with them.     The patient has MGUS.  Difference with MGUS and myeloma discussed.  Natural history of MGUS discussed.  It can progress to myeloma at a rate of 1% per year.     I reassured the patient that at this time she does not have myeloma.  I am not suspecting any cancer. I explained to her that MGUS is not causing any problem.     2.  We will continue to monitor her.  Today, we will get some labs including CBC, BMP, SPEP, IgG and free light chain.     I explained to her that for MGUS, I will see her once a year with labs.  She is agreeable for it.     3.  The patient had few questions, which were all answered.  I advised her to call us with any questions or concerns.  I advised her to see a physician if she has worsening weakness, bone pain, unexplained weight loss, recurrent infection or any other concerns.     Thanks for the consult.    ADDENDUM: Labs from today (06/15/2022):  -M-spike of 0.2.  -Kappa FLC of 2.70, lambda FLC of 1.85 and ratio of 1.46.  -IgG of 1322.     Total visit time of 45 minutes.  Time spent in today's visit, review of chart/investigations and documentation  today.     Ronen Ortega MD         Again, thank you for allowing me to participate in the care of your patient.        Sincerely,        Ronen Ortega MD

## 2022-06-15 NOTE — PROGRESS NOTES
Visit Date: 06/15/2022    This consult has been requested by Stefanie Pantoja, AMANDA, CNP, for monoclonal gammopathy     Ms. Stevens is a 56-year-old female with peripheral neuropathy.  The patient follows up with Neurology.  Over the last few months, she had multiple investigations done and was found to have monoclonal gammopathy.  Hematology consult is requested for that.  Investigations are reviewed and summarized below.  1.  On 03/08/2022:    -Normal CBC.  -Normal CMP.  2.  On 03/09/2022:    -Normal vitamin B2.  -Normal TSH.  -Normal MMA.  -Normal folate.  -Serum immunofixation reveals possible faint monoclonal IgG lambda.  3.  On 03/11/2022, SPEP reveals monoclonal peak of 0.1.  4.  On 03/23/2022:    -Serum immunofixation reveals very small monoclonal IgG lambda.  -Urine immunofixation reveals very small monoclonal IgG lambda.     The patient denies any personal history of cancer.  No family history of myeloma or any hematologic malignancy.     The patient continues to have neuropathic symptoms.  The patient has numbness and tingling in the fingers and toes.  The patient has muscle weakness.  She also has some back pain.  She is following up with neurologist.     REVIEW OF SYSTEMS:  No headache.  No dizziness.  No chest pain. No shortness of breath.  No abdominal pain, nausea or vomiting.  No urinary or bowel complaints.  No abnormal bleeding.     All other review of systems negative.     MEDICATIONS:  Reviewed.     PAST MEDICAL HISTORY:     1.  Peripheral neuropathy.  2.  Hypertension.  3.  Newly diagnosed sleep apnea.  4.  Total abdominal hysterectomy.  5.  Breast biopsy, which was benign.     SOCIAL HISTORY:    -No smoking.  -No alcohol use.     PHYSICAL ASSESSMENT:    GENERAL:  She is alert, oriented x 3.  Not in any distress.  VITAL SIGNS:  Reviewed.     Rest of the systems not examined.     LABS:  Reviewed.     ASSESSMENT:     1.  A 56-year-old female with monoclonal gammopathy of undetermined  significance (MGUS).  2.  Peripheral neuropathy.  3.  Other medical problems including hypertension and sleep apnea.     RECOMMENDATION:     1.  I had a long discussion with the patient and her daughter.  Investigations were all reviewed with them.     The patient has MGUS.  Difference with MGUS and myeloma discussed.  Natural history of MGUS discussed.  It can progress to myeloma at a rate of 1% per year.     I reassured the patient that at this time she does not have myeloma.  I am not suspecting any cancer. I explained to her that MGUS is not causing any problem.     2.  We will continue to monitor her.  Today, we will get some labs including CBC, BMP, SPEP, IgG and free light chain.     I explained to her that for MGUS, I will see her once a year with labs.  She is agreeable for it.     3.  The patient had few questions, which were all answered.  I advised her to call us with any questions or concerns.  I advised her to see a physician if she has worsening weakness, bone pain, unexplained weight loss, recurrent infection or any other concerns.     Thanks for the consult.    ADDENDUM: Labs from today (06/15/2022):  -M-spike of 0.2.  -Kappa FLC of 2.70, lambda FLC of 1.85 and ratio of 1.46.  -IgG of 1322.     Total visit time of 45 minutes.  Time spent in today's visit, review of chart/investigations and documentation today.     Ronen Ortega MD

## 2022-06-15 NOTE — PROGRESS NOTES
"Oncology Rooming Note    Wilda 15, 2022 1:21 PM   Jimmy Stevens is a 56 year old female who presents for:    Chief Complaint   Patient presents with     Oncology Clinic Visit     Initial Vitals: /89   Pulse 105   Resp 16   Wt 89.9 kg (198 lb 4.8 oz)   LMP  (LMP Unknown)   SpO2 98%   BMI 34.04 kg/m   Estimated body mass index is 34.04 kg/m  as calculated from the following:    Height as of 3/23/22: 1.626 m (5' 4\").    Weight as of this encounter: 89.9 kg (198 lb 4.8 oz). Body surface area is 2.01 meters squared.  Severe Pain (6) Comment: Data Unavailable   No LMP recorded (lmp unknown). Patient has had a hysterectomy.  Allergies reviewed: Yes  Medications reviewed: Yes    Medications: Medication refills not needed today.  Pharmacy name entered into [employer] - [id number] - [place], [place] - [address] AT [facility] [address]    Clinical concerns:  doctor was notified.      Grisel Pitt CMA            "

## 2022-06-16 LAB
ALBUMIN SERPL ELPH-MCNC: 4.4 G/DL (ref 3.7–5.1)
ALPHA1 GLOB SERPL ELPH-MCNC: 0.3 G/DL (ref 0.2–0.4)
ALPHA2 GLOB SERPL ELPH-MCNC: 0.7 G/DL (ref 0.5–0.9)
B-GLOBULIN SERPL ELPH-MCNC: 1 G/DL (ref 0.6–1)
GAMMA GLOB SERPL ELPH-MCNC: 1.3 G/DL (ref 0.7–1.6)
IGG SERPL-MCNC: 1322 MG/DL (ref 610–1616)
KAPPA LC FREE SER-MCNC: 2.7 MG/DL (ref 0.33–1.94)
KAPPA LC FREE/LAMBDA FREE SER NEPH: 1.46 {RATIO} (ref 0.26–1.65)
LAMBDA LC FREE SERPL-MCNC: 1.85 MG/DL (ref 0.57–2.63)
M PROTEIN SERPL ELPH-MCNC: 0.2 G/DL
PROT PATTERN SERPL ELPH-IMP: ABNORMAL

## 2022-06-16 PROCEDURE — 84165 PROTEIN E-PHORESIS SERUM: CPT | Mod: 26

## 2022-06-16 NOTE — RESULT ENCOUNTER NOTE
Dear Ms. Stevens,    Blood tests are stable. MGUS is stable.    Please, call me with any questions.    Ronen Ortega MD

## 2022-06-19 NOTE — PROGRESS NOTES
This consult has been requested by Stefanie Pantoja, AMANDA, CNP, for monoclonal gammopathy     Ms. Stevens is a 56-year-old female with peripheral neuropathy.  The patient follows up with Neurology.  Over the last few months, she had multiple investigations done and was found to have monoclonal gammopathy.  Hematology consult is requested for that.  Investigations are reviewed and summarized below.  1.  On 03/08/2022:    -Normal CBC.  -Normal CMP.  2.  On 03/09/2022:    -Normal vitamin B2.  -Normal TSH.  -Normal MMA.  -Normal folate.  -Serum immunofixation reveals possible faint monoclonal IgG lambda.  3.  On 03/11/2022, SPEP reveals monoclonal peak of 0.1.  4.  On 03/23/2022:    -Serum immunofixation reveals very small monoclonal IgG lambda.  -Urine immunofixation reveals very small monoclonal IgG lambda.     The patient denies any personal history of cancer.  No family history of myeloma or any hematologic malignancy.     The patient continues to have neuropathic symptoms.  The patient has numbness and tingling in the fingers and toes.  The patient has muscle weakness.  She also has some back pain.  She is following up with neurologist.     REVIEW OF SYSTEMS:  No headache.  No dizziness.  No chest pain. No shortness of breath.  No abdominal pain, nausea or vomiting.  No urinary or bowel complaints.  No abnormal bleeding.     All other review of systems negative.     MEDICATIONS:  Reviewed.     PAST MEDICAL HISTORY:     1.  Peripheral neuropathy.  2.  Hypertension.  3.  Newly diagnosed sleep apnea.  4.  Total abdominal hysterectomy.  5.  Breast biopsy, which was benign.     SOCIAL HISTORY:    -No smoking.  -No alcohol use.     PHYSICAL ASSESSMENT:    GENERAL:  She is alert, oriented x 3.  Not in any distress.  VITAL SIGNS:  Reviewed.     Rest of the systems not examined.     LABS:  Reviewed.     ASSESSMENT:     1.  A 56-year-old female with monoclonal gammopathy of undetermined significance (MGUS).  2.   Peripheral neuropathy.  3.  Other medical problems including hypertension and sleep apnea.     RECOMMENDATION:     1.  I had a long discussion with the patient and her daughter.  Investigations were all reviewed with them.     The patient has MGUS.  Difference with MGUS and myeloma discussed.  Natural history of MGUS discussed.  It can progress to myeloma at a rate of 1% per year.     I reassured the patient that at this time she does not have myeloma.  I am not suspecting any cancer. I explained to her that MGUS is not causing any problem.     2.  We will continue to monitor her.  Today, we will get some labs including CBC, BMP, SPEP, IgG and free light chain.     I explained to her that for MGUS, I will see her once a year with labs.  She is agreeable for it.     3.  The patient had few questions, which were all answered.  I advised her to call us with any questions or concerns.  I advised her to see a physician if she has worsening weakness, bone pain, unexplained weight loss, recurrent infection or any other concerns.     Thanks for the consult.    ADDENDUM: Labs from today (06/15/2022):  -M-spike of 0.2.  -Kappa FLC of 2.70, lambda FLC of 1.85 and ratio of 1.46.  -IgG of 1322.     Total visit time of 45 minutes.  Time spent in today's visit, review of chart/investigations and documentation today.     Ronen Ortega MD

## 2022-08-02 ENCOUNTER — MEDICAL CORRESPONDENCE (OUTPATIENT)
Dept: HEALTH INFORMATION MANAGEMENT | Facility: CLINIC | Age: 57
End: 2022-08-02

## 2022-08-02 ENCOUNTER — OFFICE VISIT (OUTPATIENT)
Dept: INTERNAL MEDICINE | Facility: CLINIC | Age: 57
End: 2022-08-02
Payer: COMMERCIAL

## 2022-08-02 VITALS
WEIGHT: 200 LBS | SYSTOLIC BLOOD PRESSURE: 114 MMHG | HEIGHT: 64 IN | HEART RATE: 96 BPM | DIASTOLIC BLOOD PRESSURE: 79 MMHG | TEMPERATURE: 98.2 F | BODY MASS INDEX: 34.15 KG/M2 | OXYGEN SATURATION: 96 %

## 2022-08-02 DIAGNOSIS — Z12.11 SCREEN FOR COLON CANCER: ICD-10-CM

## 2022-08-02 DIAGNOSIS — G62.9 NEUROPATHY: ICD-10-CM

## 2022-08-02 DIAGNOSIS — M54.50 CHRONIC BILATERAL LOW BACK PAIN WITHOUT SCIATICA: ICD-10-CM

## 2022-08-02 DIAGNOSIS — Z12.31 VISIT FOR SCREENING MAMMOGRAM: ICD-10-CM

## 2022-08-02 DIAGNOSIS — G89.29 CHRONIC PAIN OF RIGHT KNEE: ICD-10-CM

## 2022-08-02 DIAGNOSIS — E78.5 HYPERLIPIDEMIA LDL GOAL <160: ICD-10-CM

## 2022-08-02 DIAGNOSIS — E66.01 MORBID OBESITY (H): ICD-10-CM

## 2022-08-02 DIAGNOSIS — Z00.00 ENCOUNTER FOR ROUTINE ADULT HEALTH EXAMINATION WITHOUT ABNORMAL FINDINGS: Primary | ICD-10-CM

## 2022-08-02 DIAGNOSIS — G89.29 CHRONIC BILATERAL LOW BACK PAIN WITHOUT SCIATICA: ICD-10-CM

## 2022-08-02 DIAGNOSIS — I10 ESSENTIAL HYPERTENSION, BENIGN: ICD-10-CM

## 2022-08-02 DIAGNOSIS — M25.561 CHRONIC PAIN OF RIGHT KNEE: ICD-10-CM

## 2022-08-02 DIAGNOSIS — D47.2 MONOCLONAL GAMMOPATHY OF UNKNOWN SIGNIFICANCE (MGUS): ICD-10-CM

## 2022-08-02 LAB
ALBUMIN SERPL-MCNC: 3.8 G/DL (ref 3.4–5)
ALP SERPL-CCNC: 63 U/L (ref 40–150)
ALT SERPL W P-5'-P-CCNC: 17 U/L (ref 0–50)
AST SERPL W P-5'-P-CCNC: 9 U/L (ref 0–45)
BILIRUB DIRECT SERPL-MCNC: <0.1 MG/DL (ref 0–0.2)
BILIRUB SERPL-MCNC: 0.3 MG/DL (ref 0.2–1.3)
CHOLEST SERPL-MCNC: 184 MG/DL
FASTING STATUS PATIENT QL REPORTED: NO
HDLC SERPL-MCNC: 42 MG/DL
LDLC SERPL CALC-MCNC: 83 MG/DL
NONHDLC SERPL-MCNC: 142 MG/DL
PROT SERPL-MCNC: 7.8 G/DL (ref 6.8–8.8)
TRIGL SERPL-MCNC: 296 MG/DL

## 2022-08-02 PROCEDURE — 36415 COLL VENOUS BLD VENIPUNCTURE: CPT | Performed by: INTERNAL MEDICINE

## 2022-08-02 PROCEDURE — 91305 COVID-19,PF,PFIZER (12+ YRS): CPT | Performed by: INTERNAL MEDICINE

## 2022-08-02 PROCEDURE — 0054A COVID-19,PF,PFIZER (12+ YRS): CPT | Performed by: INTERNAL MEDICINE

## 2022-08-02 PROCEDURE — 99396 PREV VISIT EST AGE 40-64: CPT | Mod: 25 | Performed by: INTERNAL MEDICINE

## 2022-08-02 PROCEDURE — 99214 OFFICE O/P EST MOD 30 MIN: CPT | Mod: 25 | Performed by: INTERNAL MEDICINE

## 2022-08-02 PROCEDURE — 80076 HEPATIC FUNCTION PANEL: CPT | Performed by: INTERNAL MEDICINE

## 2022-08-02 PROCEDURE — 80061 LIPID PANEL: CPT | Performed by: INTERNAL MEDICINE

## 2022-08-02 RX ORDER — LISINOPRIL 20 MG/1
TABLET ORAL
Qty: 180 TABLET | Refills: 1 | Status: SHIPPED | OUTPATIENT
Start: 2022-08-02 | End: 2023-02-06

## 2022-08-02 ASSESSMENT — ENCOUNTER SYMPTOMS
COUGH: 0
DIARRHEA: 0
PALPITATIONS: 1
DYSURIA: 0
CONSTIPATION: 0
PARESTHESIAS: 0
BREAST MASS: 0
HEARTBURN: 0
HEMATURIA: 0
NERVOUS/ANXIOUS: 0
MYALGIAS: 1
FREQUENCY: 0
HEADACHES: 0
FEVER: 0
EYE PAIN: 0
ARTHRALGIAS: 1
DIZZINESS: 0
SHORTNESS OF BREATH: 0
JOINT SWELLING: 0
NAUSEA: 0
CHILLS: 0
ABDOMINAL PAIN: 0
WEAKNESS: 1
HEMATOCHEZIA: 0
SORE THROAT: 0

## 2022-08-02 NOTE — PROGRESS NOTES
SUBJECTIVE:   CC: Jimmy Stevens is an 56 year old woman who presents for preventive health visit.     Patient has been advised of split billing requirements and indicates understanding: Yes  Healthy Habits:     Getting at least 3 servings of Calcium per day:  Yes    Bi-annual eye exam:  Yes    Dental care twice a year:  NO    Sleep apnea or symptoms of sleep apnea:  Daytime drowsiness    Diet:  Low salt, Low fat/cholesterol and Vegetarian/vegan    Frequency of exercise:  4-5 days/week    Duration of exercise:  30-45 minutes    Taking medications regularly:  Yes    Medication side effects:  Muscle aches and Other    PHQ-2 Total Score: 0    Additional concerns today:  Yes    Ability to successfully perform activities of daily living: Yes, no assistance needed  Home safety:  none identified   Hearing impairment: Yes,       Today's PHQ-2 Score:   PHQ-2 ( 1999 [employer]) 4/26/2022   Q1: Little interest or pleasure in doing things 1   Q2: Feeling down, depressed or hopeless 0   PHQ-2 Score 1   PHQ-2 Total Score (12-17 Years)- Positive if 3 or more points; Administer PHQ-A if positive -   Q1: Little interest or pleasure in doing things Several days   Q2: Feeling down, depressed or hopeless Not at all   PHQ-2 Score 1       Abuse: Current or Past (Physical, Sexual or Emotional) - No  Do you feel safe in your environment? Yes        Social History     Tobacco Use     Smoking status: Never Smoker     Smokeless tobacco: Never Used   Substance Use Topics     Alcohol use: No         Alcohol Use 4/8/2021   Prescreen: >3 drinks/day or >7 drinks/week? Not Applicable   Prescreen: >3 drinks/day or >7 drinks/week? -       Reviewed orders with patient.  Reviewed health maintenance and updated orders accordingly - Yes      Breast Cancer Screening: discussed    FHS-7:   Breast CA Risk Assessment (FHS-7) 5/2/2022   Did any of your first-degree relatives have breast or ovarian cancer? No   Did any of your relatives have bilateral  "breast cancer? No   Did any man in your family have breast cancer? No   Did any woman in your family have breast and ovarian cancer? No   Did any woman in your family have breast cancer before age 50 y? No   Do you have 2 or more relatives with breast and/or ovarian cancer? No   Do you have 2 or more relatives with breast and/or bowel cancer? No       Pertinent mammograms are reviewed under the imaging tab.    History of abnormal Pap smear: NO - age 30-65 PAP every 5 years with negative HPV co-testing recommended  PAP / HPV Latest Ref Rng & Units 3/10/2016 11/19/2007 5/18/2006   PAP (Historical) - NIL NIL NIL   HPV16 NEG Negative - -   HPV18 NEG Negative - -   HRHPV NEG Negative - -     Reviewed and updated as needed this visit by clinical staff                    Reviewed and updated as needed this visit by Provider                   Review of Systems   Patient has had a chronic history of multiple somatic complaints.  CONSTITUTIONAL: NEGATIVE for fever, chills, change in weight  EYES: NEGATIVE for vision changes or irritation  ENT: NEGATIVE for ear, mouth and throat problems  RESP: NEGATIVE for significant cough or SOB  CV: NEGATIVE for chest pain, palpitations or peripheral edema  GI: NEGATIVE for nausea, abdominal pain, heartburn, or change in bowel habits  MUSCULOSKELETAL: + for significant arthralgias or myalgia along with right-sided knee pain and chronic knee pain for quite some time.  She would like to see a orthopedist  NEURO: + for weakness, dizziness or paresthesias, see recent Neurology note  PSYCHIATRIC: + for changes in mood or affect      OBJECTIVE:   /79   Pulse 96   Temp 98.2  F (36.8  C) (Oral)   Ht 1.626 m (5' 4\")   Wt 90.7 kg (200 lb)   LMP  (LMP Unknown)   SpO2 96%   BMI 34.33 kg/m    Physical Exam  GENERAL: alert and no distress, frail  EYES: Eyes grossly normal to inspection, PERRL and conjunctivae and sclerae normal  HENT: ear canals and TM's normal, nose and mouth without " ulcers or lesions  NECK: no adenopathy, no asymmetry, masses, or scars and thyroid normal to palpation  RESP: lungs clear to auscultation - no rales, rhonchi or wheezes  Breast exam declined  CV: regular rate and rhythm, normal S1 S2, no S3 or S4, no click or rub  ABDOMEN: obese, soft, nontender, no hepatosplenomegaly, no masses and bowel sounds normal  MS: no gross musculoskeletal defects noted, no edema  NEURO: No focal changes less subjective neuropathy changes noted .  It is slowed and slightly antalgic with the use of a cane  Able to move all 4 limbs against gravity weak finger  bilaterally  Sensations are diminished vibration distally at the toes intact at the ankles  Diminished to light touch and temperature below the ankles and below the wrist  No tremors or involuntary movements  PSYCH: mentation appears normal, affect anxious/flat    Component      Latest Ref Rng & Units 6/15/2022 6/15/2022           2:17 PM  2:17 PM   Sodium      133 - 144 mmol/L  138   Potassium      3.4 - 5.3 mmol/L  4.1   Chloride      94 - 109 mmol/L  106   Carbon Dioxide      20 - 32 mmol/L  29   Anion Gap      3 - 14 mmol/L  3   Urea Nitrogen      7 - 30 mg/dL  16   Creatinine      0.52 - 1.04 mg/dL  0.88   Calcium      8.5 - 10.1 mg/dL  9.3   Glucose      70 - 99 mg/dL  93   GFR Estimate      >60 mL/min/1.73m2  77   WBC      4.0 - 11.0 10e3/uL 8.8    RBC Count      3.80 - 5.20 10e6/uL 4.73    Hemoglobin      11.7 - 15.7 g/dL 13.4    Hematocrit      35.0 - 47.0 % 41.0    MCV      78 - 100 fL 87    MCH      26.5 - 33.0 pg 28.3    MCHC      31.5 - 36.5 g/dL 32.7    RDW      10.0 - 15.0 % 12.7    Platelet Count      150 - 450 10e3/uL 351      ASSESSMENT/PLAN:   (Z00.00) Encounter for routine adult health examination without abnormal findings  (primary encounter diagnosis)  Comment: Advised patient to consider shingles vaccination  Plan:     (I10) Essential hypertension, benign  Comment: Stable on therapy and continuing with current  medical management.  Repeat blood pressure check in 6 months, medication refill  Plan: lisinopril (ZESTRIL) 20 MG tablet            (E78.5) Hyperlipidemia LDL goal <160  Comment: Labs ordered as fasting per routine.  Plan: Lipid Profile, Hepatic function panel            (D47.2) Monoclonal gammopathy of unknown significance (MGUS)  Comment: Noticed baseline history and followed through oncology clinic, recommended routine follow-up lab work  Plan:     (E66.01) Obesity (BMI 35.0-39.9) with comorbidity (H)  Comment: Encouraged ongoing weight loss and weight reduction  Plan:     (M54.50,  G89.29) Chronic bilateral low back pain without sciatica  Comment: Physical therapy referral placed.  Patient requesting this for ongoing chronic back pain  Plan: Physical Therapy Referral, OFFICE/OUTPT         VISIT,EST,LEVL IV            (G62.9) Neuropathy  Comment: Noticed baseline and followed in the neurology clinic.  Continue with supportive care use  Plan: Noticed use of cane for support    Patient also has numerous disability forms that she had been reviewed.  These apparently were filled out by her neurologist who she recently saw through the end of this calendar year.    (Z12.11) Screen for colon cancer  Comment: Anoscopy again advised but refused  Plan: Fecal colorectal cancer screen FIT            (Z12.31) Visit for screening mammogram  Comment: Mammogram results reviewed with patient  Plan:     (M25.561,  G89.29) Chronic pain of right knee  Comment: Referral placed to orthopedic assessment for potential steroid injection  Plan: OFFICE/OUTPT VISIT,EST,LEVL IV, Orthopedic          Referral              Patient has been advised of split billing requirements and indicates understanding: Yes    COUNSELING:  Reviewed preventive health counseling, as reflected in patient instructions       Regular exercise       Healthy diet/nutrition    Estimated body mass index is 34.04 kg/m  as calculated from the following:     "Height as of 6/15/22: 1.626 m (5' 4\").    Weight as of 6/15/22: 89.9 kg (198 lb 4.8 oz).        She reports that she has never smoked. She has never used smokeless tobacco.      Counseling Resources:  ATP IV Guidelines  Pooled Cohorts Equation Calculator  Breast Cancer Risk Calculator  BRCA-Related Cancer Risk Assessment: FHS-7 Tool  FRAX Risk Assessment  ICSI Preventive Guidelines  Dietary Guidelines for Americans, 2010  USDA's MyPlate  ASA Prophylaxis  Lung CA Screening    Nick Calderon MD  [facility]  "

## 2022-08-04 ENCOUNTER — THERAPY VISIT (OUTPATIENT)
Dept: PHYSICAL THERAPY | Facility: CLINIC | Age: 57
End: 2022-08-04
Attending: INTERNAL MEDICINE
Payer: COMMERCIAL

## 2022-08-04 DIAGNOSIS — M54.50 CHRONIC BILATERAL LOW BACK PAIN WITHOUT SCIATICA: ICD-10-CM

## 2022-08-04 DIAGNOSIS — G89.29 CHRONIC BILATERAL LOW BACK PAIN WITHOUT SCIATICA: ICD-10-CM

## 2022-08-04 PROCEDURE — 97530 THERAPEUTIC ACTIVITIES: CPT | Mod: GP | Performed by: PHYSICAL THERAPIST

## 2022-08-04 PROCEDURE — 97161 PT EVAL LOW COMPLEX 20 MIN: CPT | Mod: GP | Performed by: PHYSICAL THERAPIST

## 2022-08-04 NOTE — PROGRESS NOTES
Physical Therapy Initial Evaluation  Subjective:  The history is provided by the patient. No  was used.   Patient Health History  Jimmy Stevens being seen for low back pain.     Date of Onset: 2016.   Problem occurred: a fall   Pain is reported as 7/10 on pain scale.  General health as reported by patient is fair.  Pertinent medical history includes: numbness/tingling.   Red flags:  Significant weakness and progressive neurological deficits (multiple work-up by neurology for complaints of bilateral hand weakness; numbness/tingling bilateral feet/hands).  Medical allergies: see epic.       Current medications: see epic.    Current occupation is .   Primary job tasks include:  Computer work, prolonged sitting and prolonged standing.                  Therapist Generated HPI Evaluation  Problem details: Pt presents with complaints of back pain with L LE sx's. Pt reported onset of back pain years ago-fall in 2016-with increasing sx's over the past year. Pt reported recent MD office visit with referral to PT and orthopedics-secondary to R knee pain. Pt reported she is currently working as a ; pt reports limited work hours secondary to back/knee pain. Pt reports limited functional ability with home related tasks secondary to back/knee issues.         Type of problem:  Lumbar.    This is a chronic condition.  Condition occurred with:  A fall/slip.  Where condition occurred: at home.  Patient reports pain:  Lumbar spine left and central lumbar spine.  Pain is described as aching and is constant.  Pain radiates to:  Gluteals left and thigh left. Pain is worse during the day and worse during the night.  Since onset symptoms are unchanged.  Associated symptoms:  Loss of strength, numbness and tingling. Symptoms are exacerbated by standing, walking, sitting and bending  and relieved by other (gabepentin).  Special tests included:  MRI.  Previous treatment includes chiropractic.  There was mild improvement following previous treatment.  Restrictions due to condition include:  Working in normal job with restrictions (working up to 4 hours/shift).                          Objective:    Gait:  Ambulated to clinic with use of a cane; cane noted to be of incorrect height-too tall-and was utilized on the R vs the L-reports of severe R knee pain. Pt reported use of the cane for balance and R knee pain  Assistive Devices:  Cane                 Lumbar/SI Evaluation  ROM:    AROM Lumbar:   Flexion:        Fingertips to mid lower leg; increased back pain during forwards flexion and with return to upright  Ext:                    Significant loss in standing; increased back pain at end range   Side Bend:        Left:     Right:   Rotation:           Left:     Right:   Side Glide:        Left:     Right:                   Neural Tension/Mobility:    Left side:  SLR positive.     Right side:   SLR  negative.                                                  Knee Evaluation:  ROM:    AROM      Extension:  Left: 0    Right:  0  Flexion: Left: 125    Right: 125    Pain: increased knee pain at end range flexion bilaterally  Endfeel: firm end feel to passive extension bilaterally                    General     ROS    Assessment/Plan:    Patient is a 56 year old female with lumbar complaints.    Patient has the following significant findings with corresponding treatment plan.                Diagnosis 1:  Chronic low back pain  Pain -  self management, education and home program  Decreased ROM/flexibility - therapeutic exercise  Decreased strength - therapeutic exercise and therapeutic activities  Impaired balance - neuro re-education and therapeutic activities  Impaired gait - gait training  Impaired muscle performance - neuro re-education  Decreased function - therapeutic activities  Impaired posture - neuro re-education    Therapy Evaluation Codes:   1) History comprised of:   Personal factors that impact the  plan of care:      Coping style, Past/current experiences and Time since onset of symptoms.    Comorbidity factors that impact the plan of care are:      Osteoarthritis and Overweight.     Medications impacting care: None.  2) Examination of Body Systems comprised of:   Body structures and functions that impact the plan of care:      Lumbar spine.   Activity limitations that impact the plan of care are:      Bathing, Bending, Cooking, Dressing, Lifting, Sitting, Standing, Walking, Working and Sleeping.  3) Clinical presentation characteristics are:   Stable/Uncomplicated.  4) Decision-Making    Low complexity using standardized patient assessment instrument and/or measureable assessment of functional outcome.  Cumulative Therapy Evaluation is: Low complexity.    Previous and current functional limitations:  (See Goal Flow Sheet for this information)    Short term and Long term goals: (See Goal Flow Sheet for this information)     Communication ability:  Patient appears to be able to clearly communicate and understand verbal and written communication and follow directions correctly.  Treatment Explanation - The following has been discussed with the patient:   RX ordered/plan of care  Anticipated outcomes  Possible risks and side effects  This patient would benefit from PT intervention to resume normal activities.   Rehab potential is fair.    Frequency:  1 X week, once daily  Duration:  for 6-8 visits  Discharge Plan:  Independent in home treatment program.  Reach maximal therapeutic benefit.    Please refer to the daily flowsheet for treatment today, total treatment time and time spent performing 1:1 timed codes.

## 2022-08-11 ENCOUNTER — THERAPY VISIT (OUTPATIENT)
Dept: PHYSICAL THERAPY | Facility: CLINIC | Age: 57
End: 2022-08-11
Payer: COMMERCIAL

## 2022-08-11 DIAGNOSIS — G89.29 CHRONIC BILATERAL LOW BACK PAIN WITHOUT SCIATICA: Primary | ICD-10-CM

## 2022-08-11 DIAGNOSIS — M54.50 CHRONIC BILATERAL LOW BACK PAIN WITHOUT SCIATICA: Primary | ICD-10-CM

## 2022-08-11 PROCEDURE — 97110 THERAPEUTIC EXERCISES: CPT | Mod: 59 | Performed by: PHYSICAL THERAPIST

## 2022-08-11 PROCEDURE — 97530 THERAPEUTIC ACTIVITIES: CPT | Mod: GP | Performed by: PHYSICAL THERAPIST

## 2022-08-24 ENCOUNTER — OFFICE VISIT (OUTPATIENT)
Dept: ORTHOPEDICS | Facility: CLINIC | Age: 57
End: 2022-08-24
Payer: COMMERCIAL

## 2022-08-24 VITALS
BODY MASS INDEX: 33.75 KG/M2 | SYSTOLIC BLOOD PRESSURE: 142 MMHG | DIASTOLIC BLOOD PRESSURE: 102 MMHG | WEIGHT: 197.7 LBS | HEIGHT: 64 IN

## 2022-08-24 DIAGNOSIS — M17.11 PRIMARY OSTEOARTHRITIS OF RIGHT KNEE: Primary | ICD-10-CM

## 2022-08-24 DIAGNOSIS — G89.29 CHRONIC PAIN OF RIGHT KNEE: ICD-10-CM

## 2022-08-24 DIAGNOSIS — M25.561 CHRONIC PAIN OF RIGHT KNEE: ICD-10-CM

## 2022-08-24 PROCEDURE — 99203 OFFICE O/P NEW LOW 30 MIN: CPT | Mod: 25 | Performed by: FAMILY MEDICINE

## 2022-08-24 PROCEDURE — 20610 DRAIN/INJ JOINT/BURSA W/O US: CPT | Mod: RT | Performed by: FAMILY MEDICINE

## 2022-08-24 RX ADMIN — TRIAMCINOLONE ACETONIDE 40 MG: 40 INJECTION, SUSPENSION INTRA-ARTICULAR; INTRAMUSCULAR at 17:50

## 2022-08-24 RX ADMIN — LIDOCAINE HYDROCHLORIDE 4 ML: 10 INJECTION, SOLUTION INFILTRATION; PERINEURAL at 17:50

## 2022-08-24 ASSESSMENT — PAIN SCALES - GENERAL: PAINLEVEL: EXTREME PAIN (8)

## 2022-08-24 NOTE — PATIENT INSTRUCTIONS
Thank you for choosing [facility]    [facility] CLIN[facility] LOCATIONS  [place] [address] [address] [facility], [address] [place], [address] [place] [address]   [phone]       APPOINTMENTS: [phone]    CARE QUESTIONS: [phone],    BILLING QUESTIONS: [phone]    FAX NUMBER: [phone]        Follow up: as needed      1. Primary osteoarthritis of right knee    2. Chronic pain of right knee        Steroid Injection Information:    A corticosteroid injection was administered at your visit today.  The area of injection may be sore, slightly swollen for 1-2 days afterward.  Immediately after injection, you may have an area of numbness, which is caused by the local anesthetic, lidocaine (similar to novacaine) in the shot.  This medicine will wear off in about 4 hours.  In addition to the lidocaine, a steroid medication was injected, called triamcinolone acetate.  This medication is intended to provide long-acting antiinflammatory / pain relief.  It may take 2-5 days for this medication to provide noticeable relief.      After an injection, Dr. Mae recommends:    Protecting the area wearing a bandage or gauze pad for at least 24 hours.    Using ice; ice bag or frozen vegetables over the injection site every one to two hours when able (for 15 minutes at a time).      Avoid any strenuous activity even if the knee is already feeling better immediately afterward. Light stretching is encouraged but refrain from home exercise program and increasing activity level for about 48 hours.    Avoid soaking in a hot tub, bath, or pool for 48 hours after injection. Showering is fine!    Watch for signs of infection, including increasing pain, redness and swelling that last more than 48 hours after injection.

## 2022-08-24 NOTE — PROGRESS NOTES
CHIEF COMPLAINT:  Pain of the Right Knee       HISTORY OF PRESENT ILLNESS  Ms. Stevens is a pleasant 56 year old year old female who presents to clinic today with right knee pain.  Jimmy explains that she's had ongoing pain for years that was exacerbated by a fall in 2016 and has been worsening over the last 6 months with no new injury.    Onset: worsening  Location: right knee  Quality:  sharp  Duration: had fall in 2016, worsening in last 6 months  Severity: 9/10 at worst  Timing:intermittent episodes   Modifying factors:  resting and non-use makes it better, movement and use makes it worse  Associated signs & symptoms: pain, stiffness  Treatments to date: heated coconut oil, topicals, knee sleeve, cane for ambulation, PT and home exercise program    Additional history: as documented    Review of Systems: A 10-point review of systems was obtained and is negative except for as noted in the HPI.     MEDICAL HISTORY  Patient Active Problem List   Diagnosis     iamCERVICALGIA     iamTENSION HEADACHE     iamPAIN IN LIMB     Acute stress reaction     Knee pain     HYPERLIPIDEMIA LDL GOAL <160     Essential hypertension, benign     Obesity (BMI 35.0-39.9) with comorbidity (H)     Chronic bilateral low back pain without sciatica     Neuropathy       Current Outpatient Medications   Medication Sig Dispense Refill     gabapentin (NEURONTIN) 300 MG capsule Take 2 capsules (600 mg) by mouth 3 times daily 180 capsule 1     ibuprofen (ADVIL/MOTRIN) 200 MG tablet Take 400 mg by mouth every 6 hours as needed for mild pain       lisinopril (ZESTRIL) 20 MG tablet TAKE 1 TABLET BY MOUTH TWICE A  tablet 1     methocarbamol (ROBAXIN) 750 MG tablet Take 1 tablet (750 mg) by mouth 2 times daily as needed for muscle spasms 60 tablet 1       Allergies   Allergen Reactions     H2 Antagonists      Mylan (itching)     Minocycline        Family History   Problem Relation Age of Onset     Genitourinary Problems Mother          "B:1942 Alive     Family History Negative Father         B:1940 Alive     Family History Negative Sister         5 sisters all healthy     Family History Negative Brother         1 brother healthy       Additional medical/Social/Surgical histories reviewed in [facility] and updated as appropriate.       PHYSICAL EXAM  BP (!) 142/102   Ht 1.626 m (5' 4\")   Wt 89.7 kg (197 lb 11.2 oz)   LMP  (LMP Unknown)   BMI 33.94 kg/m      General  - normal appearance, in no obvious distress  HEENT  - conjunctivae not injected, moist mucous membranes  CV  - normal popliteal pulse  Pulm  - normal respiratory pattern, non-labored  Musculoskeletal - right knee  - stance: mildly antalgic gait, genu varum  - inspection: generalized swelling, trace effusion  - palpation: medial joint line tenderness, patellar facet tenderness, normal popliteal pulse  - ROM: 100 degrees flexion, 0 degrees extension, painful active ROM  - strength: 5/5 in flexion, 5/5 in extension  - special tests:  (-) Lachman  (-) anterior drawer  (-) Gus  (-) Thessaly  (-) varus at 0 and 30 degrees flexion  (-) valgus at 0 and 30 degrees flexion  Neuro  - no sensory or motor deficit, grossly normal coordination, normal muscle tone  Skin  - no ecchymosis, erythema, warmth, or induration, no obvious rash  Psych  - interactive, appropriate, normal mood and affect    IMAGING : XR knee right 3V. Final results and radiologist's interpretation, available in the [facility] health record. Images were reviewed with the patient/family members in the office today. My personal interpretation of the performed imaging is severe medial bone-on-bone osteoarthritis of medial and PF compartments.     ASSESSMENT & PLAN  Ms. Stevens is a 56 year old year old female who presents to clinic today with chronic right knee pain and known osteoarthritis since imaging in 2017.      At that time in 2017 arthritic changes were severe but she was caring for others and did not want to pursue relief for " her own pain.    Diagnosis:   1. Severe primary osteoarthritis of right knee    Discussed high likelihood she will need arthroplasty to address this issue adequately.  She wishes to exhaust all conservative measures.  At this time she should continue to use a cane to offload her knee.  I have recommended she try a brace for her knee.  I sent her to [facility] for an appropriately fitting hinged knee brace such as a DonJoy reaction.  We also discussed consideration of injections and I did agree to consider 1 corticosteroid injection.  If this does not provide adequate benefit, I would not pursue any other types of injections due to the severity of her arthritis.    I will see her back in 6 weeks time.  At that juncture we may decide whether she should be seen by an arthroplasty consultant or discuss any other measures available to her.    PT may be beneficial in the future, more likely pool therapy, but due to the acute pain she is experiencing would like to decrease this to improve her candidacy and effectiveness of any therapy.    PROCEDURE    Right Knee Injection - Intraarticular  The patient was informed of the risks and the benefits of the procedure and a written consent was signed.  The patient s right knee was prepped with chlorhexidine in sterile fashion.   40 mg of triamcinolone suspension was drawn up into a 5 mL syringe with 4 mL of 1% lidocaine.  Injection was performed using substerile technique.  A 1.5-inch 22-gauge needle was used to enter the lateral aspect of the right knee.  Injection performed successfully without difficulty.  There were no complications. The patient tolerated the procedure well. There was negligible bleeding.   The patient was instructed to ice the knee upon leaving clinic and refrain from overuse over the next 3 days.   The patient was instructed to call or go to the emergency room with any unusual pain, swelling, redness, or if otherwise concerned.  A follow up appointment will be  scheduled to evaluate response to the injection, and to assess range of motion and pain.      Large Joint Injection/Arthocentesis: R knee joint    Date/Time: 8/24/2022 5:50 PM  Performed by: Cortez Mae DO  Authorized by: Cortez Mae DO     Indications:  Pain  Needle Size:  22 G  Guidance: landmark guided    Approach:  Anterolateral  Location:  Knee      Medications:  4 mL lidocaine 1 %; 40 mg triamcinolone 40 MG/ML  Outcome:  Tolerated well, no immediate complications  Procedure discussed: discussed risks, benefits, and alternatives    Consent Given by:  Patient  Timeout: timeout called immediately prior to procedure    Prep: patient was prepped and draped in usual sterile fashion          It was a pleasure seeing Jimmy today.    Cortez Mae DO, CAM  Primary Care Sports Medicine

## 2022-08-24 NOTE — LETTER
8/24/2022         RE: Jimmy Stevens  [address]  [facility] [address]        Dear Colleague,    Thank you for referring your patient, Jimmy Stevens, to the [facility] [place]. Please see a copy of my visit note below.    CHIEF COMPLAINT:  Pain of the Right Knee       HISTORY OF PRESENT ILLNESS  Ms. Stevens is a pleasant 56 year old year old female who presents to clinic today with right knee pain.  Jimmy explains that she's had ongoing pain for years that was exacerbated by a fall in 2016 and has been worsening over the last 6 months with no new injury.    Onset: worsening  Location: right knee  Quality:  sharp  Duration: had fall in 2016, worsening in last 6 months  Severity: 9/10 at worst  Timing:intermittent episodes   Modifying factors:  resting and non-use makes it better, movement and use makes it worse  Associated signs & symptoms: pain, stiffness  Treatments to date: heated coconut oil, topicals, knee sleeve, cane for ambulation, PT and home exercise program    Additional history: as documented    Review of Systems: A 10-point review of systems was obtained and is negative except for as noted in the HPI.     MEDICAL HISTORY  Patient Active Problem List   Diagnosis     iamCERVICALGIA     iamTENSION HEADACHE     iamPAIN IN LIMB     Acute stress reaction     Knee pain     HYPERLIPIDEMIA LDL GOAL <160     Essential hypertension, benign     Obesity (BMI 35.0-39.9) with comorbidity (H)     Chronic bilateral low back pain without sciatica     Neuropathy       Current Outpatient Medications   Medication Sig Dispense Refill     gabapentin (NEURONTIN) 300 MG capsule Take 2 capsules (600 mg) by mouth 3 times daily 180 capsule 1     ibuprofen (ADVIL/MOTRIN) 200 MG tablet Take 400 mg by mouth every 6 hours as needed for mild pain       lisinopril (ZESTRIL) 20 MG tablet TAKE 1 TABLET BY MOUTH TWICE A  tablet 1     methocarbamol (ROBAXIN) 750 MG tablet Take 1  "tablet (750 mg) by mouth 2 times daily as needed for muscle spasms 60 tablet 1       Allergies   Allergen Reactions     H2 Antagonists      Mylan (itching)     Minocycline        Family History   Problem Relation Age of Onset     Genitourinary Problems Mother         B:1942 Alive     Family History Negative Father         B:1940 Alive     Family History Negative Sister         5 sisters all healthy     Family History Negative Brother         1 brother healthy       Additional medical/Social/Surgical histories reviewed in [facility] and updated as appropriate.       PHYSICAL EXAM  BP (!) 142/102   Ht 1.626 m (5' 4\")   Wt 89.7 kg (197 lb 11.2 oz)   LMP  (LMP Unknown)   BMI 33.94 kg/m      General  - normal appearance, in no obvious distress  HEENT  - conjunctivae not injected, moist mucous membranes  CV  - normal popliteal pulse  Pulm  - normal respiratory pattern, non-labored  Musculoskeletal - right knee  - stance: mildly antalgic gait, genu varum  - inspection: generalized swelling, trace effusion  - palpation: medial joint line tenderness, patellar facet tenderness, normal popliteal pulse  - ROM: 100 degrees flexion, 0 degrees extension, painful active ROM  - strength: 5/5 in flexion, 5/5 in extension  - special tests:  (-) Lachman  (-) anterior drawer  (-) Gus  (-) Thessaly  (-) varus at 0 and 30 degrees flexion  (-) valgus at 0 and 30 degrees flexion  Neuro  - no sensory or motor deficit, grossly normal coordination, normal muscle tone  Skin  - no ecchymosis, erythema, warmth, or induration, no obvious rash  Psych  - interactive, appropriate, normal mood and affect    IMAGING : XR knee right 3V. Final results and radiologist's interpretation, available in the [facility] health record. Images were reviewed with the patient/family members in the office today. My personal interpretation of the performed imaging is severe medial bone-on-bone osteoarthritis of medial and PF compartments.     ASSESSMENT & PLAN  Ms. " Rodney is a 56 year old year old female who presents to clinic today with chronic right knee pain and known osteoarthritis since imaging in 2017.      At that time in 2017 arthritic changes were severe but she was caring for others and did not want to pursue relief for her own pain.    Diagnosis:   1. Severe primary osteoarthritis of right knee    Discussed high likelihood she will need arthroplasty to address this issue adequately.  She wishes to exhaust all conservative measures.  At this time she should continue to use a cane to offload her knee.  I have recommended she try a brace for her knee.  I sent her to [facility] for an appropriately fitting hinged knee brace such as a DonJoy reaction.  We also discussed consideration of injections and I did agree to consider 1 corticosteroid injection.  If this does not provide adequate benefit, I would not pursue any other types of injections due to the severity of her arthritis.    I will see her back in 6 weeks time.  At that juncture we may decide whether she should be seen by an arthroplasty consultant or discuss any other measures available to her.    PT may be beneficial in the future, more likely pool therapy, but due to the acute pain she is experiencing would like to decrease this to improve her candidacy and effectiveness of any therapy.    PROCEDURE    Right Knee Injection - Intraarticular  The patient was informed of the risks and the benefits of the procedure and a written consent was signed.  The patient s right knee was prepped with chlorhexidine in sterile fashion.   40 mg of triamcinolone suspension was drawn up into a 5 mL syringe with 4 mL of 1% lidocaine.  Injection was performed using substerile technique.  A 1.5-inch 22-gauge needle was used to enter the lateral aspect of the right knee.  Injection performed successfully without difficulty.  There were no complications. The patient tolerated the procedure well. There was negligible bleeding.   The  patient was instructed to ice the knee upon leaving clinic and refrain from overuse over the next 3 days.   The patient was instructed to call or go to the emergency room with any unusual pain, swelling, redness, or if otherwise concerned.  A follow up appointment will be scheduled to evaluate response to the injection, and to assess range of motion and pain.      Large Joint Injection/Arthocentesis: R knee joint    Date/Time: 8/24/2022 5:50 PM  Performed by: Cortez Mae DO  Authorized by: Cortez Mae DO     Indications:  Pain  Needle Size:  22 G  Guidance: landmark guided    Approach:  Anterolateral  Location:  Knee      Medications:  4 mL lidocaine 1 %; 40 mg triamcinolone 40 MG/ML  Outcome:  Tolerated well, no immediate complications  Procedure discussed: discussed risks, benefits, and alternatives    Consent Given by:  Patient  Timeout: timeout called immediately prior to procedure    Prep: patient was prepped and draped in usual sterile fashion          It was a pleasure seeing Yarelikumar today.    Cortez Mae DO, [facility]  Primary Care Sports Medicine      Again, thank you for allowing me to participate in the care of your patient.        Sincerely,        Cortez Mae DO

## 2022-08-25 RX ORDER — TRIAMCINOLONE ACETONIDE 40 MG/ML
40 INJECTION, SUSPENSION INTRA-ARTICULAR; INTRAMUSCULAR
Status: DISCONTINUED | OUTPATIENT
Start: 2022-08-24 | End: 2024-05-20

## 2022-08-25 RX ORDER — LIDOCAINE HYDROCHLORIDE 10 MG/ML
4 INJECTION, SOLUTION INFILTRATION; PERINEURAL
Status: DISCONTINUED | OUTPATIENT
Start: 2022-08-24 | End: 2024-05-20

## 2022-10-23 ENCOUNTER — HEALTH MAINTENANCE LETTER (OUTPATIENT)
Age: 57
End: 2022-10-23

## 2022-11-01 NOTE — PROGRESS NOTES
Pt completed IE and one subsequent visit. Pt cancelled 3rd visit due to illness; did not re-schedule. Pt is discharged.

## 2023-02-06 DIAGNOSIS — G62.9 NEUROPATHY: ICD-10-CM

## 2023-02-06 DIAGNOSIS — I10 ESSENTIAL HYPERTENSION, BENIGN: ICD-10-CM

## 2023-02-06 RX ORDER — LISINOPRIL 20 MG/1
TABLET ORAL
Qty: 180 TABLET | Refills: 1 | Status: SHIPPED | OUTPATIENT
Start: 2023-02-06 | End: 2023-08-30

## 2023-02-06 RX ORDER — METHOCARBAMOL 750 MG/1
750 TABLET, FILM COATED ORAL 2 TIMES DAILY PRN
Qty: 60 TABLET | Refills: 1 | Status: SHIPPED | OUTPATIENT
Start: 2023-02-06 | End: 2023-12-12

## 2023-03-01 ENCOUNTER — TRANSFERRED RECORDS (OUTPATIENT)
Dept: MULTI SPECIALTY CLINIC | Facility: CLINIC | Age: 58
End: 2023-03-01

## 2023-03-28 ENCOUNTER — OFFICE VISIT (OUTPATIENT)
Dept: INTERNAL MEDICINE | Facility: CLINIC | Age: 58
End: 2023-03-28
Payer: COMMERCIAL

## 2023-03-28 VITALS
HEART RATE: 100 BPM | RESPIRATION RATE: 16 BRPM | OXYGEN SATURATION: 97 % | SYSTOLIC BLOOD PRESSURE: 156 MMHG | BODY MASS INDEX: 34.72 KG/M2 | TEMPERATURE: 97.4 F | DIASTOLIC BLOOD PRESSURE: 102 MMHG | HEIGHT: 64 IN | WEIGHT: 203.4 LBS

## 2023-03-28 DIAGNOSIS — I10 ESSENTIAL HYPERTENSION, BENIGN: Primary | ICD-10-CM

## 2023-03-28 DIAGNOSIS — M54.50 CHRONIC BILATERAL LOW BACK PAIN WITHOUT SCIATICA: ICD-10-CM

## 2023-03-28 DIAGNOSIS — Z12.31 VISIT FOR SCREENING MAMMOGRAM: ICD-10-CM

## 2023-03-28 DIAGNOSIS — E66.01 MORBID OBESITY (H): ICD-10-CM

## 2023-03-28 DIAGNOSIS — Z12.11 SCREEN FOR COLON CANCER: ICD-10-CM

## 2023-03-28 DIAGNOSIS — E78.5 HYPERLIPIDEMIA LDL GOAL <160: ICD-10-CM

## 2023-03-28 DIAGNOSIS — D47.2 MGUS (MONOCLONAL GAMMOPATHY OF UNKNOWN SIGNIFICANCE): ICD-10-CM

## 2023-03-28 DIAGNOSIS — G89.29 CHRONIC BILATERAL LOW BACK PAIN WITHOUT SCIATICA: ICD-10-CM

## 2023-03-28 PROCEDURE — 99214 OFFICE O/P EST MOD 30 MIN: CPT | Performed by: INTERNAL MEDICINE

## 2023-03-28 RX ORDER — AMLODIPINE BESYLATE 5 MG/1
5 TABLET ORAL DAILY
Qty: 90 TABLET | Refills: 1 | Status: SHIPPED | OUTPATIENT
Start: 2023-03-28 | End: 2023-08-30

## 2023-03-28 NOTE — PROGRESS NOTES
"  Assessment & Plan     Essential hypertension, benign  Suggest adding amlodipine 5 mg daily.  Encourage compliance with lisinopril twice daily.  Follow-up blood pressure check 3 months  - amLODIPine (NORVASC) 5 MG tablet; Take 1 tablet (5 mg) by mouth daily    Chronic bilateral low back pain without sciatica  Stable and chronic in nature.  Continue with outpatient assessment and physical therapy.  Patient's handicap parking sticker was filled out for her    Obesity (BMI 35.0-39.9) with comorbidity (H)  Encouraged ongoing weight loss and weight reduction    Hyperlipidemia LDL goal <160  Recommend annual lipid panel and dietary risk reduction therapy    Visit for screening mammogram  Ordered as routine screening for upcoming mammography  - *MA Screening Digital Bilateral; Future    Screen for colon cancer  Again recommended colonoscopy but patient has declined  - Fecal colorectal cancer screen FIT - Future (S+30); Future    MGUS (monoclonal gammopathy of unknown significance)  Following up with Dr. Ortega for routine lab work.    LDL Cholesterol Calculated   Date Value Ref Range Status   08/02/2022 83 <=100 mg/dL Final   04/08/2021 96 <100 mg/dL Final     Comment:     Desirable:       <100 mg/dl          BMI:   Estimated body mass index is 34.91 kg/m  as calculated from the following:    Height as of this encounter: 1.626 m (5' 4\").    Weight as of this encounter: 92.3 kg (203 lb 6.4 oz).   Weight management plan: Discussed healthy diet and exercise guidelines    See Patient Instructions    Nick Calderon MD  [facility]    Subjective :    Jimmy is a 57 year old, presenting for the following health issues:    Patient is here today again for follow-up.  She is doing somewhat better.  She has had chronic issues with psychosocial stressors and mental health concerns as well as chronic back pain.  The patient at times is mildly noncompliant with therapy.    She has been undergoing " outpatient physical therapy minutes followed by her orthopedist for her chronic knee pain as well as her hematologist for her MGUS.    She has not had a colonoscopy which was again discussed.    We again discussed and mentioned updating all of her vaccinations which she again has deferred.      History of Present Illness       Back Pain:  She presents for follow up of back pain. Patient's back pain is a recurring problem.  Location of back pain:  Right lower back, left lower back, left buttock and left hip  Description of back pain: burning  Back pain spreads: left buttocks, left thigh and right knee    Since patient first noticed back pain, pain is: always present, but gets better and worse  Does back pain interfere with her job:  Yes      Hypertension: She presents for follow up of hypertension.  She does check blood pressure  regularly outside of the clinic. Outside blood pressures have been over 140/90. She follows a low salt diet.     Reason for visit:  Followup visit as requested by Dr Nick Stevens    She eats 2-3 servings of fruits and vegetables daily.She consumes 0 sweetened beverage(s) daily.She exercises with enough effort to increase her heart rate 30 to 60 minutes per day.  She exercises with enough effort to increase her heart rate 3 or less days per week.   She is taking medications regularly.     Other concerns:  1. Patient requesting a renewal for handicap parking certificate - done and signed.    Review of Systems   CONSTITUTIONAL: NEGATIVE for fever, chills, change in weight  EYES: NEGATIVE for vision changes or irritation  ENT/MOUTH: NEGATIVE for ear, mouth and throat problems  RESP: NEGATIVE for significant cough or SOB  CV: NEGATIVE for chest pain, palpitations or peripheral edema  GI: NEGATIVE for nausea, abdominal pain, heartburn, or change in bowel habits  : NEGATIVE for frequency, dysuria, or hematuria  NEURO: NEGATIVE for weakness, dizziness  HEME: NEGATIVE for bleeding  "problems  PSYCHIATRIC: NEGATIVE for changes in mood or affect      Objective    BP (!) 156/102   Pulse 100   Temp 97.4  F (36.3  C) (Temporal)   Resp 16   Ht 1.626 m (5' 4\")   Wt 92.3 kg (203 lb 6.4 oz)   LMP  (LMP Unknown)   SpO2 97%   BMI 34.91 kg/m    Body mass index is 34.91 kg/m .  Physical Exam   GENERAL: alert and no distress  EYES: Eyes grossly normal to inspection, PERRL and conjunctivae and sclerae normal  HENT: ear canals and TM's normal, nose and mouth without ulcers or lesions  RESP: lungs clear to auscultation - no rales, rhonchi or wheezes  CV: regular rate and rhythm, normal S1 S2, no S3 or S4, no click or rub  ABDOMEN: soft, nontender, no hepatosplenomegaly, no masses and bowel sounds normal  MS: no gross musculoskeletal defects noted  NEURO: No focal changes  PSYCH: mentation appears normal, affect flat per baseline    Component      Latest Ref Rng & Units 6/15/2022 8/2/2022   Sodium      133 - 144 mmol/L 138    Potassium      3.4 - 5.3 mmol/L 4.1    Chloride      94 - 109 mmol/L 106    Carbon Dioxide      20 - 32 mmol/L 29    Anion Gap      3 - 14 mmol/L 3    Urea Nitrogen      7 - 30 mg/dL 16    Creatinine      0.52 - 1.04 mg/dL 0.88    Calcium      8.5 - 10.1 mg/dL 9.3    Glucose      70 - 99 mg/dL 93    GFR Estimate      >60 mL/min/1.73m2 77    WBC      4.0 - 11.0 10e3/uL 8.8    RBC Count      3.80 - 5.20 10e6/uL 4.73    Hemoglobin      11.7 - 15.7 g/dL 13.4    Hematocrit      35.0 - 47.0 % 41.0    MCV      78 - 100 fL 87    MCH      26.5 - 33.0 pg 28.3    MCHC      31.5 - 36.5 g/dL 32.7    RDW      10.0 - 15.0 % 12.7    Platelet Count      150 - 450 10e3/uL 351    Bilirubin Total      0.2 - 1.3 mg/dL  0.3   Bilirubin Direct      0.0 - 0.2 mg/dL  <0.1   Protein Total      6.8 - 8.8 g/dL  7.8   Albumin      3.4 - 5.0 g/dL  3.8   Alkaline Phosphatase      40 - 150 U/L  63   AST      0 - 45 U/L  9   ALT      0 - 50 U/L  17   Cholesterol      <200 mg/dL  184   Triglycerides      <150 mg/dL  " 296 (H)   HDL Cholesterol      >=50 mg/dL  42 (L)   LDL Cholesterol Calculated      <=100 mg/dL  83   Non HDL Cholesterol      <130 mg/dL  142 (H)

## 2023-05-31 ENCOUNTER — ANCILLARY PROCEDURE (OUTPATIENT)
Dept: MAMMOGRAPHY | Facility: CLINIC | Age: 58
End: 2023-05-31
Attending: INTERNAL MEDICINE
Payer: COMMERCIAL

## 2023-05-31 ENCOUNTER — TELEPHONE (OUTPATIENT)
Dept: INTERNAL MEDICINE | Facility: CLINIC | Age: 58
End: 2023-05-31

## 2023-05-31 DIAGNOSIS — Z12.31 VISIT FOR SCREENING MAMMOGRAM: ICD-10-CM

## 2023-05-31 PROCEDURE — 77067 SCR MAMMO BI INCL CAD: CPT | Mod: TC | Performed by: RADIOLOGY

## 2023-05-31 NOTE — TELEPHONE ENCOUNTER
Reason for Call:  Form, our goal is to have forms completed with 72 hours, however, some forms may require a visit or additional information.    Type of letter, form or note:  disability    Who is the form from?:  Disability Certificate form    Where did the form come from: Patient or family brought in        What clinic location was the form placed at?: Internal Medicine    Where the form was placed: Folder Box/Folder       What number is listed as a contact on the form?: [phone]        Additional comments:     Call taken on 5/31/2023 at 8:48 AM by MAGDALENE HENSON

## 2023-06-06 PROCEDURE — 82274 ASSAY TEST FOR BLOOD FECAL: CPT | Performed by: INTERNAL MEDICINE

## 2023-06-07 LAB — HEMOCCULT STL QL IA: NEGATIVE

## 2023-06-08 NOTE — TELEPHONE ENCOUNTER
Completed form mailed to patients home address, per her request.  Copy made and sent to be scanned into chart.

## 2023-06-15 ENCOUNTER — LAB (OUTPATIENT)
Dept: INFUSION THERAPY | Facility: CLINIC | Age: 58
End: 2023-06-15
Attending: INTERNAL MEDICINE
Payer: COMMERCIAL

## 2023-06-15 ENCOUNTER — ONCOLOGY VISIT (OUTPATIENT)
Dept: ONCOLOGY | Facility: CLINIC | Age: 58
End: 2023-06-15
Attending: INTERNAL MEDICINE
Payer: COMMERCIAL

## 2023-06-15 VITALS
WEIGHT: 204 LBS | BODY MASS INDEX: 35.02 KG/M2 | RESPIRATION RATE: 16 BRPM | HEART RATE: 97 BPM | DIASTOLIC BLOOD PRESSURE: 90 MMHG | OXYGEN SATURATION: 100 % | SYSTOLIC BLOOD PRESSURE: 128 MMHG

## 2023-06-15 DIAGNOSIS — D47.2 MGUS (MONOCLONAL GAMMOPATHY OF UNKNOWN SIGNIFICANCE): ICD-10-CM

## 2023-06-15 DIAGNOSIS — D47.2 MGUS (MONOCLONAL GAMMOPATHY OF UNKNOWN SIGNIFICANCE): Primary | ICD-10-CM

## 2023-06-15 LAB
ANION GAP SERPL CALCULATED.3IONS-SCNC: 10 MMOL/L (ref 7–15)
BUN SERPL-MCNC: 16.2 MG/DL (ref 6–20)
CALCIUM SERPL-MCNC: 9.1 MG/DL (ref 8.6–10)
CHLORIDE SERPL-SCNC: 103 MMOL/L (ref 98–107)
CREAT SERPL-MCNC: 0.77 MG/DL (ref 0.51–0.95)
DEPRECATED HCO3 PLAS-SCNC: 27 MMOL/L (ref 22–29)
ERYTHROCYTE [DISTWIDTH] IN BLOOD BY AUTOMATED COUNT: 13.5 % (ref 10–15)
GFR SERPL CREATININE-BSD FRML MDRD: 89 ML/MIN/1.73M2
GLUCOSE SERPL-MCNC: 98 MG/DL (ref 70–99)
HCT VFR BLD AUTO: 40.2 % (ref 35–47)
HGB BLD-MCNC: 13 G/DL (ref 11.7–15.7)
MCH RBC QN AUTO: 28.3 PG (ref 26.5–33)
MCHC RBC AUTO-ENTMCNC: 32.3 G/DL (ref 31.5–36.5)
MCV RBC AUTO: 88 FL (ref 78–100)
PLATELET # BLD AUTO: 338 10E3/UL (ref 150–450)
POTASSIUM SERPL-SCNC: 4.8 MMOL/L (ref 3.4–5.3)
RBC # BLD AUTO: 4.59 10E6/UL (ref 3.8–5.2)
SODIUM SERPL-SCNC: 140 MMOL/L (ref 136–145)
TOTAL PROTEIN SERUM FOR ELP: 6.9 G/DL (ref 6.4–8.3)
WBC # BLD AUTO: 7.8 10E3/UL (ref 4–11)

## 2023-06-15 PROCEDURE — 84165 PROTEIN E-PHORESIS SERUM: CPT | Mod: TC | Performed by: PATHOLOGY

## 2023-06-15 PROCEDURE — 84155 ASSAY OF PROTEIN SERUM: CPT | Performed by: INTERNAL MEDICINE

## 2023-06-15 PROCEDURE — G0463 HOSPITAL OUTPT CLINIC VISIT: HCPCS | Performed by: INTERNAL MEDICINE

## 2023-06-15 PROCEDURE — 80048 BASIC METABOLIC PNL TOTAL CA: CPT | Performed by: INTERNAL MEDICINE

## 2023-06-15 PROCEDURE — 84165 PROTEIN E-PHORESIS SERUM: CPT | Mod: 26

## 2023-06-15 PROCEDURE — 85027 COMPLETE CBC AUTOMATED: CPT | Performed by: INTERNAL MEDICINE

## 2023-06-15 PROCEDURE — 36415 COLL VENOUS BLD VENIPUNCTURE: CPT

## 2023-06-15 PROCEDURE — 83521 IG LIGHT CHAINS FREE EACH: CPT | Performed by: INTERNAL MEDICINE

## 2023-06-15 PROCEDURE — 82784 ASSAY IGA/IGD/IGG/IGM EACH: CPT | Performed by: INTERNAL MEDICINE

## 2023-06-15 PROCEDURE — 99214 OFFICE O/P EST MOD 30 MIN: CPT | Performed by: INTERNAL MEDICINE

## 2023-06-15 ASSESSMENT — PAIN SCALES - GENERAL: PAINLEVEL: NO PAIN (0)

## 2023-06-15 NOTE — PROGRESS NOTES
Medical Assistant Note:  Jimmy Stevens presents today for lab draw.    Patient seen by provider today: Yes: JACOB.   present during visit today: Not Applicable.    Concerns: No Concerns.    Procedure:  Lab draw site: Right hand, Needle type: Butterfly, Gauge: 23.    Post Assessment:  Labs drawn without difficulty: Yes.    Discharge Plan:  Departure Mode: Ambulatory.    Face to Face Time: 4 min.    Shari Schoenberger, CMA

## 2023-06-15 NOTE — PROGRESS NOTES
"Oncology Rooming Note    Wilda 15, 2023 1:47 PM   Jimmy Stevens is a 57 year old female who presents for:    Chief Complaint   Patient presents with     Oncology Clinic Visit     Initial Vitals: LMP  (LMP Unknown)  Estimated body mass index is 34.91 kg/m  as calculated from the following:    Height as of 3/28/23: 1.626 m (5' 4\").    Weight as of 3/28/23: 92.3 kg (203 lb 6.4 oz). There is no height or weight on file to calculate BSA.  Data Unavailable Comment: Data Unavailable   No LMP recorded (lmp unknown). Patient has had a hysterectomy.  Allergies reviewed: Yes  Medications reviewed: Yes    Medications: Medication refills not needed today.  Pharmacy name entered into [facility]: [id number] - [place], [place] - [address] AT [facility] [address]    Clinical concerns:  doctor was notified.      Grisel Pitt CMA            "

## 2023-06-15 NOTE — LETTER
"    6/15/2023         RE: Jimmy Stevens  [address]  [facility] [address]        Dear Colleague,    Thank you for referring your patient, Jimmy Stevesn, to the [facility] [place]. Please see a copy of my visit note below.    Oncology Rooming Note    Wilda 15, 2023 1:47 PM   Jimmy Stevens is a 57 year old female who presents for:    Chief Complaint   Patient presents with     Oncology Clinic Visit     Initial Vitals: LMP  (LMP Unknown)  Estimated body mass index is 34.91 kg/m  as calculated from the following:    Height as of 3/28/23: 1.626 m (5' 4\").    Weight as of 3/28/23: 92.3 kg (203 lb 6.4 oz). There is no height or weight on file to calculate BSA.  Data Unavailable Comment: Data Unavailable   No LMP recorded (lmp unknown). Patient has had a hysterectomy.  Allergies reviewed: Yes  Medications reviewed: Yes    Medications: Medication refills not needed today.  Pharmacy name entered into [facility]: [id number] - [facility] [address] AT [facility] [address]    Clinical concerns:  doctor was notified.      Grisel Pitt, [facility]              HEMATOLOGY HISTORY: Ms. Stevens is a female with MGUS.  1.  On 03/08/2022:    -Normal CBC.  -Normal CMP.  2.  On 03/09/2022:    -Normal vitamin B2.  -Normal TSH.  -Normal MMA.  -Normal folate.  -Serum immunofixation reveals possible faint monoclonal IgG lambda.  3.  On 03/11/2022, SPEP reveals monoclonal peak of 0.1.  4.  On 03/23/2022:    -Serum immunofixation reveals very small monoclonal IgG lambda.  -Urine immunofixation reveals very small monoclonal IgG lambda.    SUBJECTIVE: Ms. Stevens is a female with MGUS.  She is here for follow-up.    Overall she feels better.  Her fatigue is less.  She is able to do all her activities.  Her neuropathy is better.    No headache.  No dizziness.  No chest pain.  No shortness of breath at rest.  No abdominal pain.  No nausea or vomiting.  Appetite overall has been good. "  No urinary or bowel complaints.  No bleeding.  All other review of system is negative.    PHYSICAL EXAMINATION:   GENERAL:  Alert and oriented x 3.   VITAL SIGNS:  Reviewed.    EYES:  No icterus.   NECK:  Supple. No lymphadenopathy. No thyromegaly.   AXILLAE:  No lymphadenopathy.   LUNGS:  Good air entry bilaterally.  No crackles or wheezing.   HEART:  Regular.  No murmur.   GI:   Abdomen is soft.  Nontender. No mass.   EXTREMITIES:  No pedal edema.  No calf swelling or tenderness.   SKIN:  No rash.     LABS: CBC, BMP, SPEP, IgG and free light chain reviewed.    ASSESSMENT:  1.  A 57-year-old female with monoclonal gammopathy of undetermined significance (MGUS).  MGUS is stable.  2.  Peripheral neuropathy. Improving.  3.  Other medical problems including hypertension and sleep apnea.    PLAN:  1.  Patient overall is is doing well.  Her fatigue and neuropathy her better.    Labs are all reviewed with her.  Explained to her that MGUS is stable.  Natural history of disease explained.  Risk of progression to myeloma is low at about 1 %/year.    Explained to her that her fatigue and neuropathy are not related to MGUS.    We will monitor MGUS once a year.  She is agreeable for it.    2.  She had few questions which were all answered.  I will see her in a year with labs.    Total visit time of 30 minutes.  Time spent in today's visit, review of chart/investigations today and documentation today.      Again, thank you for allowing me to participate in the care of your patient.        Sincerely,        Ronen Ortega MD

## 2023-06-16 LAB
ALBUMIN SERPL ELPH-MCNC: 3.9 G/DL (ref 3.7–5.1)
ALPHA1 GLOB SERPL ELPH-MCNC: 0.3 G/DL (ref 0.2–0.4)
ALPHA2 GLOB SERPL ELPH-MCNC: 0.7 G/DL (ref 0.5–0.9)
B-GLOBULIN SERPL ELPH-MCNC: 0.9 G/DL (ref 0.6–1)
GAMMA GLOB SERPL ELPH-MCNC: 1.1 G/DL (ref 0.7–1.6)
IGG SERPL-MCNC: 1224 MG/DL (ref 610–1616)
KAPPA LC FREE SER-MCNC: 3.18 MG/DL (ref 0.33–1.94)
KAPPA LC FREE/LAMBDA FREE SER NEPH: 1.54 {RATIO} (ref 0.26–1.65)
LAMBDA LC FREE SERPL-MCNC: 2.06 MG/DL (ref 0.57–2.63)
M PROTEIN SERPL ELPH-MCNC: 0.1 G/DL
PROT PATTERN SERPL ELPH-IMP: ABNORMAL

## 2023-06-18 NOTE — PROGRESS NOTES
HEMATOLOGY HISTORY: Ms. Stevens is a female with MGUS.  1.  On 03/08/2022:    -Normal CBC.  -Normal CMP.  2.  On 03/09/2022:    -Normal vitamin B2.  -Normal TSH.  -Normal MMA.  -Normal folate.  -Serum immunofixation reveals possible faint monoclonal IgG lambda.  3.  On 03/11/2022, SPEP reveals monoclonal peak of 0.1.  4.  On 03/23/2022:    -Serum immunofixation reveals very small monoclonal IgG lambda.  -Urine immunofixation reveals very small monoclonal IgG lambda.    SUBJECTIVE: Ms. Stevens is a female with MGUS.  She is here for follow-up.    Overall she feels better.  Her fatigue is less.  She is able to do all her activities.  Her neuropathy is better.    No headache.  No dizziness.  No chest pain.  No shortness of breath at rest.  No abdominal pain.  No nausea or vomiting.  Appetite overall has been good.  No urinary or bowel complaints.  No bleeding.  All other review of system is negative.    PHYSICAL EXAMINATION:   GENERAL:  Alert and oriented x 3.   VITAL SIGNS:  Reviewed.    EYES:  No icterus.   NECK:  Supple. No lymphadenopathy. No thyromegaly.   AXILLAE:  No lymphadenopathy.   LUNGS:  Good air entry bilaterally.  No crackles or wheezing.   HEART:  Regular.  No murmur.   GI:   Abdomen is soft.  Nontender. No mass.   EXTREMITIES:  No pedal edema.  No calf swelling or tenderness.   SKIN:  No rash.     LABS: CBC, BMP, SPEP, IgG and free light chain reviewed.    ASSESSMENT:  1.  A 57-year-old female with monoclonal gammopathy of undetermined significance (MGUS).  MGUS is stable.  2.  Peripheral neuropathy. Improving.  3.  Other medical problems including hypertension and sleep apnea.    PLAN:  1.  Patient overall is is doing well.  Her fatigue and neuropathy her better.    Labs are all reviewed with her.  Explained to her that MGUS is stable.  Natural history of disease explained.  Risk of progression to myeloma is low at about 1 %/year.    Explained to her that her fatigue and neuropathy are not related to  MGUS.    We will monitor MGUS once a year.  She is agreeable for it.    2.  She had few questions which were all answered.  I will see her in a year with labs.    Total visit time of 30 minutes.  Time spent in today's visit, review of chart/investigations today and documentation today.

## 2023-07-03 ENCOUNTER — PATIENT OUTREACH (OUTPATIENT)
Dept: CARE COORDINATION | Facility: CLINIC | Age: 58
End: 2023-07-03
Payer: COMMERCIAL

## 2023-07-05 ENCOUNTER — OFFICE VISIT (OUTPATIENT)
Dept: INTERNAL MEDICINE | Facility: CLINIC | Age: 58
End: 2023-07-05
Payer: COMMERCIAL

## 2023-07-05 VITALS
RESPIRATION RATE: 18 BRPM | WEIGHT: 206.3 LBS | SYSTOLIC BLOOD PRESSURE: 108 MMHG | HEART RATE: 87 BPM | OXYGEN SATURATION: 98 % | DIASTOLIC BLOOD PRESSURE: 70 MMHG | HEIGHT: 64 IN | BODY MASS INDEX: 35.22 KG/M2

## 2023-07-05 DIAGNOSIS — E66.01 MORBID OBESITY (H): ICD-10-CM

## 2023-07-05 DIAGNOSIS — D47.2 MGUS (MONOCLONAL GAMMOPATHY OF UNKNOWN SIGNIFICANCE): ICD-10-CM

## 2023-07-05 DIAGNOSIS — Z12.11 COLON CANCER SCREENING: ICD-10-CM

## 2023-07-05 DIAGNOSIS — Z78.0 ASYMPTOMATIC POSTMENOPAUSAL STATUS: ICD-10-CM

## 2023-07-05 DIAGNOSIS — G89.29 CHRONIC PAIN OF RIGHT KNEE: ICD-10-CM

## 2023-07-05 DIAGNOSIS — I10 ESSENTIAL HYPERTENSION, BENIGN: Primary | ICD-10-CM

## 2023-07-05 DIAGNOSIS — E78.5 HYPERLIPIDEMIA LDL GOAL <160: ICD-10-CM

## 2023-07-05 DIAGNOSIS — M25.561 CHRONIC PAIN OF RIGHT KNEE: ICD-10-CM

## 2023-07-05 LAB
ALBUMIN SERPL BCG-MCNC: 4.1 G/DL (ref 3.5–5.2)
ALP SERPL-CCNC: 65 U/L (ref 35–104)
ALT SERPL W P-5'-P-CCNC: 11 U/L (ref 0–50)
AST SERPL W P-5'-P-CCNC: 16 U/L (ref 0–45)
BILIRUB DIRECT SERPL-MCNC: <0.2 MG/DL (ref 0–0.3)
BILIRUB SERPL-MCNC: 0.4 MG/DL
CHOLEST SERPL-MCNC: 180 MG/DL
HDLC SERPL-MCNC: 38 MG/DL
LDLC SERPL CALC-MCNC: 87 MG/DL
NONHDLC SERPL-MCNC: 142 MG/DL
PROT SERPL-MCNC: 7.3 G/DL (ref 6.4–8.3)
TRIGL SERPL-MCNC: 276 MG/DL

## 2023-07-05 PROCEDURE — 80061 LIPID PANEL: CPT | Performed by: INTERNAL MEDICINE

## 2023-07-05 PROCEDURE — 80076 HEPATIC FUNCTION PANEL: CPT | Performed by: INTERNAL MEDICINE

## 2023-07-05 PROCEDURE — 36415 COLL VENOUS BLD VENIPUNCTURE: CPT | Performed by: INTERNAL MEDICINE

## 2023-07-05 PROCEDURE — 99214 OFFICE O/P EST MOD 30 MIN: CPT | Performed by: INTERNAL MEDICINE

## 2023-07-05 NOTE — PROGRESS NOTES
Assessment & Plan     Essential hypertension, benign  Stable on therapy, continuing with current medical management, recheck blood pressure 6 months.    MGUS (monoclonal gammopathy of unknown significance)  Following up with hematology and Dr. Ortega as previously noted.    Obesity (BMI 35.0-39.9) with comorbidity (H)  Encouraged ongoing and continuing weight reduction    Colon cancer screening  Colonoscopy again advised but patient has declined    Hyperlipidemia LDL goal <160  Suggest repeat lipid panel.  - Hepatic function panel; Future  - Lipid Profile; Future    Asymptomatic postmenopausal status  Bone density scan ordered as DEXA.  Patient will schedule  - DX Hip/Pelvis/Spine; Future    Chronic pain of right knee  Advised patient that she needs to follow-up with her orthopedist to discuss further intervention.    Discussed again the need to follow-up for routine vaccinations as listed     See Patient Instructions    Nick Calderon MD  [facility]    Jordyn Marquez is a 57 year old, presenting for the following health issues:  Hypertension    History of Present Illness       Hypertension: She presents for follow up of hypertension.  She does check blood pressure  regularly outside of the clinic. Outside blood pressures have been over 140/90. She follows a low salt diet.     She eats 2-3 servings of fruits and vegetables daily.She consumes 1 sweetened beverage(s) daily.She exercises with enough effort to increase her heart rate 20 to 29 minutes per day.  She exercises with enough effort to increase her heart rate 3 or less days per week.   She is taking medications regularly.     Has been tolerating her blood pressure medicines without difficulty.  She denies any new complaints.  She has issues with chronic anxiety as well as knee pain for which she has been seen in the past by her orthopedist.  She has recently been complaining of increasing discomfort in her right  "knee and is scheduled potentially to see her orthopedist for consideration of a new steroid injection upcoming.    She states she does not need any refills.  She has recently had her mammogram.  She is not interested in a colonoscopy.  We discussed again the need to update her vaccinations.  She states she is scheduled for a upcoming examination for her annual.    Current Outpatient Medications   Medication     amLODIPine (NORVASC) 5 MG tablet     gabapentin (NEURONTIN) 300 MG capsule     ibuprofen (ADVIL/MOTRIN) 200 MG tablet     lisinopril (ZESTRIL) 20 MG tablet     methocarbamol (ROBAXIN) 750 MG tablet     Current Facility-Administered Medications   Medication     lidocaine 1 % injection 4 mL     triamcinolone (KENALOG-40) injection 40 mg         Review of Systems   CONSTITUTIONAL: NEGATIVE for fever, chills, change in weight  EYES: NEGATIVE for vision changes or irritation  ENT/MOUTH: NEGATIVE for ear, mouth and throat problems  RESP: NEGATIVE for significant cough or SOB  CV: NEGATIVE for chest pain, palpitations   GI: NEGATIVE for nausea, abdominal pain, heartburn, or change in bowel habits  : NEGATIVE for frequency, dysuria, or hematuria  NEURO: NEGATIVE for weakness, dizziness or paresthesias  HEME: NEGATIVE for bleeding problems  PSYCHIATRIC: She has issues with chronic anxiety which have been ongoing.      Objective    /70   Pulse 87   Resp 18   Ht 1.626 m (5' 4\")   Wt 93.6 kg (206 lb 4.8 oz)   LMP  (LMP Unknown)   SpO2 98%   BMI 35.41 kg/m    Body mass index is 35.41 kg/m .     Physical Exam   GENERAL: alert and no distress  EYES: Eyes grossly normal to inspection, PERRL and conjunctivae and sclerae normal  HENT: ear canals and TM's normal, nose and mouth without ulcers or lesions  NECK: no adenopathy, no asymmetry, masses, or scars and thyroid normal to palpation  RESP: lungs clear to auscultation - no rales, rhonchi or wheezes  CV: regular rate and rhythm, normal S1 S2, no S3 or S4, no " murmur, click or rub  MS: The gait is antalgic favoring the right knee with chronic degenerative changes noted.  NEURO: No focal changes  PSYCH: mentation appears normal, affect anxious

## 2023-07-10 ENCOUNTER — ANCILLARY PROCEDURE (OUTPATIENT)
Dept: BONE DENSITY | Facility: CLINIC | Age: 58
End: 2023-07-10
Attending: INTERNAL MEDICINE
Payer: COMMERCIAL

## 2023-07-10 DIAGNOSIS — Z78.0 ASYMPTOMATIC POSTMENOPAUSAL STATUS: ICD-10-CM

## 2023-07-10 PROCEDURE — 77080 DXA BONE DENSITY AXIAL: CPT | Performed by: INTERNAL MEDICINE

## 2023-08-29 NOTE — PROGRESS NOTES
SUBJECTIVE:   CC: Jimmy is an 57 year old who presents for preventive health visit.     Healthy Habits:     Getting at least 3 servings of Calcium per day:  Yes    Bi-annual eye exam:  Yes    Dental care twice a year:  NO    Sleep apnea or symptoms of sleep apnea:  None    Diet:  Low salt and Vegetarian/vegan    Frequency of exercise:  2-3 days/week    Duration of exercise:  15-30 minutes    Taking medications regularly:  Yes    Medication side effects:  Other    Additional concerns today:  No      Patient was seen for her annual examination despite showing up late for her appointment time.    PROBLEMS TO ADD ON...  Ongoing right knee pain, has not follow up with orthopedic   Requesting new disability parking certificate       Social History     Tobacco Use     Smoking status: Never     Smokeless tobacco: Never   Substance Use Topics     Alcohol use: No             8/30/2023     8:55 AM   Alcohol Use   Prescreen: >3 drinks/day or >7 drinks/week? No     Reviewed orders with patient.  Reviewed health maintenance and updated orders accordingly - Yes  Lab work is in process    Breast Cancer Screening:    FHS-7:       5/2/2022    10:36 AM 5/31/2023     8:31 AM   Breast CA Risk Assessment (FHS-7)   Did any of your first-degree relatives have breast or ovarian cancer? No No   Did any of your relatives have bilateral breast cancer? No No   Did any man in your family have breast cancer? No No   Did any woman in your family have breast and ovarian cancer? No No   Did any woman in your family have breast cancer before age 50 y? No No   Do you have 2 or more relatives with breast and/or ovarian cancer? No No   Do you have 2 or more relatives with breast and/or bowel cancer? No No       Pertinent mammograms are reviewed under the imaging tab.    History of abnormal Pap smear: NO - age 30-65 PAP every 5 years with negative HPV co-testing recommended      Latest Ref Rng & Units 3/10/2016     7:00 AM 3/10/2016    12:00 AM  11/19/2007    12:00 AM   PAP / HPV   PAP (Historical)   NIL  NIL    HPV 16 DNA NEG Negative      HPV 18 DNA NEG Negative      Other HR HPV NEG Negative        Reviewed and updated as needed this visit by clinical staff    Allergies  Meds  Problems             Reviewed and updated as needed this visit by Provider      Problems              Current Outpatient Medications   Medication     amLODIPine (NORVASC) 5 MG tablet     gabapentin (NEURONTIN) 300 MG capsule     ibuprofen (ADVIL/MOTRIN) 200 MG tablet     lisinopril (ZESTRIL) 20 MG tablet     methocarbamol (ROBAXIN) 750 MG tablet     Current Facility-Administered Medications   Medication     lidocaine 1 % injection 4 mL     triamcinolone (KENALOG-40) injection 40 mg         Review of Systems   Constitutional:  Negative for chills and fever.   HENT:  Negative for congestion, ear pain, hearing loss and sore throat.    Eyes:  Negative for pain and visual disturbance.   Respiratory:  Negative for cough and shortness of breath.    Cardiovascular:  Negative for chest pain, palpitations and peripheral edema.   Gastrointestinal:  Negative for abdominal pain, constipation, diarrhea, heartburn, hematochezia and nausea.   Breasts:  Negative for tenderness, breast mass and discharge.   Genitourinary:  Negative for dysuria, frequency, genital sores, hematuria, pelvic pain, urgency, vaginal bleeding and vaginal discharge.   Musculoskeletal:  Positive for arthralgias and myalgias. Negative for joint swelling.   Skin:  Negative for rash.   Neurological:  Negative for dizziness, weakness, headaches and paresthesias.   Psychiatric/Behavioral:  Negative for mood changes. The patient is not nervous/anxious.        Current Outpatient Medications   Medication     amLODIPine (NORVASC) 5 MG tablet     gabapentin (NEURONTIN) 300 MG capsule     ibuprofen (ADVIL/MOTRIN) 200 MG tablet     lisinopril (ZESTRIL) 20 MG tablet     methocarbamol (ROBAXIN) 750 MG tablet     Current  "Facility-Administered Medications   Medication     lidocaine 1 % injection 4 mL     triamcinolone (KENALOG-40) injection 40 mg        OBJECTIVE:   /68   Pulse 100   Temp 96.8  F (36  C) (Temporal)   Resp 16   Ht 1.626 m (5' 4\")   Wt 92.5 kg (203 lb 14.4 oz)   LMP  (LMP Unknown)   SpO2 99%   BMI 35.00 kg/m      Physical Exam:    GENERAL: alert and no distress  EYES: Eyes grossly normal to inspection, PERRL and conjunctivae and sclerae normal  HENT: ear canals and TM's normal, nose and mouth without ulcers or lesions  NECK: no adenopathy, no asymmetry, masses, or scars and thyroid normal to palpation  RESP: lungs clear to auscultation - no rales, rhonchi or wheezes  CV: regular rate and rhythm, normal S1 S2, no S3 or S4, no click or rub  ABDOMEN: mild obesity, soft, nontender, no hepatosplenomegaly, no masses and bowel sounds normal  MS: no gross musculoskeletal defects noted, no edema, antalgic gait  NEURO: Normal strength and tone, mentation intact and speech normal  PSYCH: mentation appears normal, affect anxious    Component      Latest Ref Rng 6/15/2023  1:36 PM 7/5/2023  8:50 AM   Sodium      136 - 145 mmol/L 140     Potassium      3.4 - 5.3 mmol/L 4.8     Chloride      98 - 107 mmol/L 103     Carbon Dioxide (CO2)      22 - 29 mmol/L 27     Anion Gap      7 - 15 mmol/L 10     Urea Nitrogen      6.0 - 20.0 mg/dL 16.2     Creatinine      0.51 - 0.95 mg/dL 0.77     Calcium      8.6 - 10.0 mg/dL 9.1     Glucose      70 - 99 mg/dL 98     GFR Estimate      >60 mL/min/1.73m2 89     WBC      4.0 - 11.0 10e3/uL 7.8     RBC Count      3.80 - 5.20 10e6/uL 4.59     Hemoglobin      11.7 - 15.7 g/dL 13.0     Hematocrit      35.0 - 47.0 % 40.2     MCV      78 - 100 fL 88     MCH      26.5 - 33.0 pg 28.3     MCHC      31.5 - 36.5 g/dL 32.3     RDW      10.0 - 15.0 % 13.5     Platelet Count      150 - 450 10e3/uL 338     Protein Total      6.4 - 8.3 g/dL  7.3    Albumin      3.5 - 5.2 g/dL  4.1    Bilirubin Total   "    <=1.2 mg/dL  0.4    Alkaline Phosphatase      35 - 104 U/L  65    AST      0 - 45 U/L  16    ALT      0 - 50 U/L  11    Bilirubin Direct      0.00 - 0.30 mg/dL  <0.20    Cholesterol      <200 mg/dL  180    Triglycerides      <150 mg/dL  276 (H)    HDL Cholesterol      >=50 mg/dL  38 (L)    LDL Cholesterol Calculated      <=100 mg/dL  87    Non HDL Cholesterol      <130 mg/dL  142 (H)        ASSESSMENT/PLAN:   (Z00.00) Encounter for routine adult health examination without abnormal findings  (primary encounter diagnosis)  Comment: Advised again the patient consider updating her routine vaccines.  Plan:     (I10) Essential hypertension, benign  Comment: Stable on therapy.  Continuing with medical management.  Antihypertensive therapy refilled.  Plan: amLODIPine (NORVASC) 5 MG tablet, lisinopril         (ZESTRIL) 20 MG tablet, OFFICE/OUTPT         VISIT,EST,LEVL III            (D47.2) MGUS (monoclonal gammopathy of unknown significance)  Comment: Following through oncology and hematology as directed.  CBC reviewed with patient  Plan:     (E78.5) HYPERLIPIDEMIA LDL GOAL <160  Comment: Labs ordered as fasting per routine as performed.  Plan:    LDL Cholesterol Calculated   Date Value Ref Range Status   07/05/2023 87 <=100 mg/dL Final   04/08/2021 96 <100 mg/dL Final     Comment:     Desirable:       <100 mg/dl         (M54.50,  G89.29) Chronic bilateral low back pain without sciatica  Comment: Stable and continuing with ongoing daily exercise and weight reduction  Plan:     (Z12.11) Colon cancer screening  Comment: Patient states that she had a colonoscopy this March 2023 by [facility] that was reported as normal.  She will obtain a copy for us.  Plan:     (Z12.31) Visit for screening mammogram  Comment: Dominick reviewed in May of this year.  Plan:     (M25.561,  G89.29) Chronic pain of right knee  Comment: Referral placed to see prior orthopedist for potential ongoing injection therapy versus surgical  options.  Plan: OFFICE/OUTPT VISIT,[facility], Orthopedic          Referral            Patient has been advised of split billing requirements and indicates understanding: Yes      COUNSELING:  Reviewed preventive health counseling, as reflected in patient instructions       Regular exercise       Healthy diet/nutrition        She reports that she has never smoked. She has never used smokeless tobacco.          Nick Calderon MD  [facility]

## 2023-08-30 ENCOUNTER — OFFICE VISIT (OUTPATIENT)
Dept: INTERNAL MEDICINE | Facility: CLINIC | Age: 58
End: 2023-08-30
Payer: COMMERCIAL

## 2023-08-30 VITALS
HEIGHT: 64 IN | OXYGEN SATURATION: 99 % | WEIGHT: 203.9 LBS | SYSTOLIC BLOOD PRESSURE: 106 MMHG | BODY MASS INDEX: 34.81 KG/M2 | DIASTOLIC BLOOD PRESSURE: 68 MMHG | TEMPERATURE: 96.8 F | RESPIRATION RATE: 16 BRPM | HEART RATE: 100 BPM

## 2023-08-30 DIAGNOSIS — D47.2 MGUS (MONOCLONAL GAMMOPATHY OF UNKNOWN SIGNIFICANCE): ICD-10-CM

## 2023-08-30 DIAGNOSIS — I10 ESSENTIAL HYPERTENSION, BENIGN: ICD-10-CM

## 2023-08-30 DIAGNOSIS — M54.50 CHRONIC BILATERAL LOW BACK PAIN WITHOUT SCIATICA: ICD-10-CM

## 2023-08-30 DIAGNOSIS — Z00.00 ENCOUNTER FOR ROUTINE ADULT HEALTH EXAMINATION WITHOUT ABNORMAL FINDINGS: Primary | ICD-10-CM

## 2023-08-30 DIAGNOSIS — M25.561 CHRONIC PAIN OF RIGHT KNEE: ICD-10-CM

## 2023-08-30 DIAGNOSIS — Z12.11 COLON CANCER SCREENING: ICD-10-CM

## 2023-08-30 DIAGNOSIS — E78.5 HYPERLIPIDEMIA LDL GOAL <160: ICD-10-CM

## 2023-08-30 DIAGNOSIS — G89.29 CHRONIC BILATERAL LOW BACK PAIN WITHOUT SCIATICA: ICD-10-CM

## 2023-08-30 DIAGNOSIS — G89.29 CHRONIC PAIN OF RIGHT KNEE: ICD-10-CM

## 2023-08-30 DIAGNOSIS — Z12.31 VISIT FOR SCREENING MAMMOGRAM: ICD-10-CM

## 2023-08-30 PROCEDURE — 99213 OFFICE O/P EST LOW 20 MIN: CPT | Mod: 25 | Performed by: INTERNAL MEDICINE

## 2023-08-30 PROCEDURE — 99396 PREV VISIT EST AGE 40-64: CPT | Performed by: INTERNAL MEDICINE

## 2023-08-30 RX ORDER — LISINOPRIL 20 MG/1
TABLET ORAL
Qty: 180 TABLET | Refills: 1 | Status: ON HOLD | OUTPATIENT
Start: 2023-08-30 | End: 2024-05-22

## 2023-08-30 RX ORDER — AMLODIPINE BESYLATE 5 MG/1
5 TABLET ORAL DAILY
Qty: 90 TABLET | Refills: 1 | Status: SHIPPED | OUTPATIENT
Start: 2023-08-30 | End: 2024-01-10

## 2023-08-30 ASSESSMENT — ENCOUNTER SYMPTOMS
FREQUENCY: 0
HEMATURIA: 0
WEAKNESS: 0
CONSTIPATION: 0
FEVER: 0
DIARRHEA: 0
CHILLS: 0
DYSURIA: 0
COUGH: 0
SHORTNESS OF BREATH: 0
MYALGIAS: 1
HEADACHES: 0
EYE PAIN: 0
PARESTHESIAS: 0
DIZZINESS: 0
NAUSEA: 0
PALPITATIONS: 0
ABDOMINAL PAIN: 0
JOINT SWELLING: 0
NERVOUS/ANXIOUS: 0
ARTHRALGIAS: 1
SORE THROAT: 0
HEARTBURN: 0
HEMATOCHEZIA: 0
BREAST MASS: 0

## 2023-08-30 NOTE — Clinical Note
Patient states that she had a colonoscopy this March 2023 by [facility] that was reported as normal.  She will obtain a copy for us.

## 2023-09-27 ENCOUNTER — OFFICE VISIT (OUTPATIENT)
Dept: ORTHOPEDICS | Facility: CLINIC | Age: 58
End: 2023-09-27
Attending: INTERNAL MEDICINE
Payer: COMMERCIAL

## 2023-09-27 VITALS
WEIGHT: 199 LBS | BODY MASS INDEX: 33.97 KG/M2 | DIASTOLIC BLOOD PRESSURE: 90 MMHG | SYSTOLIC BLOOD PRESSURE: 130 MMHG | HEIGHT: 64 IN

## 2023-09-27 DIAGNOSIS — M25.561 CHRONIC PAIN OF RIGHT KNEE: ICD-10-CM

## 2023-09-27 DIAGNOSIS — M17.11 PRIMARY OSTEOARTHRITIS OF RIGHT KNEE: Primary | ICD-10-CM

## 2023-09-27 DIAGNOSIS — G89.29 CHRONIC PAIN OF RIGHT KNEE: ICD-10-CM

## 2023-09-27 PROCEDURE — 20610 DRAIN/INJ JOINT/BURSA W/O US: CPT | Mod: RT | Performed by: FAMILY MEDICINE

## 2023-09-27 RX ORDER — LIDOCAINE HYDROCHLORIDE 10 MG/ML
4 INJECTION, SOLUTION INFILTRATION; PERINEURAL
Status: DISCONTINUED | OUTPATIENT
Start: 2023-09-27 | End: 2024-05-20

## 2023-09-27 RX ORDER — TRIAMCINOLONE ACETONIDE 40 MG/ML
40 INJECTION, SUSPENSION INTRA-ARTICULAR; INTRAMUSCULAR
Status: DISCONTINUED | OUTPATIENT
Start: 2023-09-27 | End: 2024-05-20

## 2023-09-27 RX ADMIN — TRIAMCINOLONE ACETONIDE 40 MG: 40 INJECTION, SUSPENSION INTRA-ARTICULAR; INTRAMUSCULAR at 17:01

## 2023-09-27 RX ADMIN — LIDOCAINE HYDROCHLORIDE 4 ML: 10 INJECTION, SOLUTION INFILTRATION; PERINEURAL at 17:01

## 2023-09-27 NOTE — LETTER
9/27/2023         RE: Jimmy Stevens  [address]  [place] [address]        Dear Colleague,    Thank you for referring your patient, Jimmy Stevens, to the [facility] [place]. Please see a copy of my visit note below.    ESTABLISHED PATIENT FOLLOW-UP:  Pain of the Right Knee       HISTORY OF PRESENT ILLNESS  Ms. Stevens is a pleasant 57 year old year old female who presents to clinic today for follow-up of right knee pain. Patient describes the knee feeling stiff, burning, and unbearable pain since her last visit.     Date of injury: fall in 2016  Date last seen: 8/24/23  Following Therapeutic Plan: Ibuprofen, CSI, PT (several visits)  Pain: worsening - burning down the medial aspect of the knee and leg, wakes at night   Function: difficulty with ambulation on elevations, stairs, uneven ground  Interval History: No new injuries/falls     Additional medical/Social/Surgical histories reviewed in [facility] and updated as appropriate.    REVIEW OF SYSTEMS (9/27/2023)  CONSTITUTIONAL: Denies fever and weight loss  GASTROINTESTINAL: Denies abdominal pain, nausea, vomiting  MUSCULOSKELETAL: See HPI  SKIN: Denies any recent rash or lesion  NEUROLOGICAL: Denies numbness or focal weakness     PHYSICAL EXAM  Wt 90.3 kg (199 lb)   LMP  (LMP Unknown)   BMI 34.16 kg/m      General  - normal appearance, in no obvious distress  Musculoskeletal - left knee  - stance: Markedly antalgic gait, mild genu varum  - inspection: At least mild effusion  - palpation: medial joint line tenderness and lateral tenderness  - ROM: 110 degrees flexion, 0 degrees extension, painful active ROM  - strength: 5/5 in flexion, 5/5 in extension  - special tests:  (-) Gus  (-) varus at 0 and 30 degrees flexion  (-) valgus at 0 and 30 degrees flexion  Neuro  - no sensory or motor deficit, grossly normal coordination, normal muscle tone     IMAGING :     EXAM: XR KNEE RIGHT 3 VIEWS  LOCATION: [facility]  [facility]  DATE/TIME: 8/24/2022 5:13 PM     INDICATION:  Primary osteoarthritis of right knee  COMPARISON: None.                                                                      IMPRESSION: Advanced tricompartmental degenerative changes which are severe in the medial and patellofemoral compartment. No acute fracture or significant effusion. Loose bodies.    ASSESSMENT & PLAN  Ms. Stevens is a 57 year old year old female who presents to clinic today for follow-up regarding known osteoarthritis of right knee.    Diagnosis: Advanced end-stage osteoarthritis of right knee    We had a detailed discussion about her limited conservative options at this point.  She wishes to attempt 1 more cortisone injection as she is caring for her ailing mother who she does not believe has many months to live.  I agreed to pursue this, but we had a katty discussion that I really think she would benefit greatly from a total knee arthroplasty.  I provided her with the name of a great physician in her area who can help her.  We briefly discussed  brace options and  brace trial but she wishes to hold off at this time.  She can continue with her knee sleeves, use of a cane and OTC analgesics.    Lastly discussed consideration for ironic acid injection at her request but I do not believe it would provide significant benefit given its indication for mild to moderate DJD.    Follow-up with me as needed    PROCEDURE    Right Knee Injection - Intraarticular  The patient was informed of the risks and the benefits of the procedure and a written consent was signed.  The patient s right knee was prepped with chlorhexidine in sterile fashion.   40 mg of triamcinolone suspension was drawn up into a 5 mL syringe with 4 mL of 1% lidocaine.  Injection was performed using substerile technique.  A 1.5-inch 22-gauge needle was used to enter the lateral aspect of the right knee.  Injection performed successfully without  difficulty.  There were no complications. The patient tolerated the procedure well. There was negligible bleeding.   The patient was instructed to ice the knee upon leaving clinic and refrain from overuse over the next 3 days.   The patient was instructed to call or go to the emergency room with any unusual pain, swelling, redness, or if otherwise concerned.  A follow up appointment will be scheduled to evaluate response to the injection, and to assess range of motion and pain.      Large Joint Injection/Arthocentesis: R knee joint    Date/Time: 9/27/2023 5:01 PM    Performed by: Cortez Mae DO  Authorized by: Cortez Mae DO    Indications:  Pain  Needle Size:  22 G  Guidance: landmark guided    Approach:  Lateral  Location:  Knee      Medications:  40 mg triamcinolone 40 MG/ML; 4 mL lidocaine 1 %  Outcome:  Tolerated well, no immediate complications  Procedure discussed: discussed risks, benefits, and alternatives    Consent Given by:  Patient  Timeout: timeout called immediately prior to procedure    Prep: patient was prepped and draped in usual sterile fashion        It was a pleasure seeing Dalila Mae DO, [facility]  Primary Care Sports Medicine         Again, thank you for allowing me to participate in the care of your patient.        Sincerely,        Cortez Mae DO

## 2023-09-27 NOTE — PROGRESS NOTES
ESTABLISHED PATIENT FOLLOW-UP:  Pain of the Right Knee       HISTORY OF PRESENT ILLNESS  Ms. Stevens is a pleasant 57 year old year old female who presents to clinic today for follow-up of right knee pain. Patient describes the knee feeling stiff, burning, and unbearable pain since her last visit.     Date of injury: fall in 2016  Date last seen: 8/24/23  Following Therapeutic Plan: Ibuprofen, CSI, PT (several visits)  Pain: worsening - burning down the medial aspect of the knee and leg, wakes at night   Function: difficulty with ambulation on elevations, stairs, uneven ground  Interval History: No new injuries/falls     Additional medical/Social/Surgical histories reviewed in [facility] and updated as appropriate.    REVIEW OF SYSTEMS (9/27/2023)  CONSTITUTIONAL: Denies fever and weight loss  GASTROINTESTINAL: Denies abdominal pain, nausea, vomiting  MUSCULOSKELETAL: See HPI  SKIN: Denies any recent rash or lesion  NEUROLOGICAL: Denies numbness or focal weakness     PHYSICAL EXAM  Wt 90.3 kg (199 lb)   LMP  (LMP Unknown)   BMI 34.16 kg/m      General  - normal appearance, in no obvious distress  Musculoskeletal - left knee  - stance: Markedly antalgic gait, mild genu varum  - inspection: At least mild effusion  - palpation: medial joint line tenderness and lateral tenderness  - ROM: 110 degrees flexion, 0 degrees extension, painful active ROM  - strength: 5/5 in flexion, 5/5 in extension  - special tests:  (-) Gus  (-) varus at 0 and 30 degrees flexion  (-) valgus at 0 and 30 degrees flexion  Neuro  - no sensory or motor deficit, grossly normal coordination, normal muscle tone     IMAGING :     EXAM: XR KNEE RIGHT 3 VIEWS  LOCATION: [facility]  DATE/TIME: 8/24/2022 5:13 PM     INDICATION:  Primary osteoarthritis of right knee  COMPARISON: None.                                                                      IMPRESSION: Advanced tricompartmental degenerative changes which are severe in the  medial and patellofemoral compartment. No acute fracture or significant effusion. Loose bodies.    ASSESSMENT & PLAN  Ms. Stevens is a 57 year old year old female who presents to clinic today for follow-up regarding known osteoarthritis of right knee.    Diagnosis: Advanced end-stage osteoarthritis of right knee    We had a detailed discussion about her limited conservative options at this point.  She wishes to attempt 1 more cortisone injection as she is caring for her ailing mother who she does not believe has many months to live.  I agreed to pursue this, but we had a katty discussion that I really think she would benefit greatly from a total knee arthroplasty.  I provided her with the name of a great physician in her area who can help her.  We briefly discussed  brace options and  brace trial but she wishes to hold off at this time.  She can continue with her knee sleeves, use of a cane and OTC analgesics.    Lastly discussed consideration for ironic acid injection at her request but I do not believe it would provide significant benefit given its indication for mild to moderate DJD.    Follow-up with me as needed    PROCEDURE    Right Knee Injection - Intraarticular  The patient was informed of the risks and the benefits of the procedure and a written consent was signed.  The patient s right knee was prepped with chlorhexidine in sterile fashion.   40 mg of triamcinolone suspension was drawn up into a 5 mL syringe with 4 mL of 1% lidocaine.  Injection was performed using substerile technique.  A 1.5-inch 22-gauge needle was used to enter the lateral aspect of the right knee.  Injection performed successfully without difficulty.  There were no complications. The patient tolerated the procedure well. There was negligible bleeding.   The patient was instructed to ice the knee upon leaving clinic and refrain from overuse over the next 3 days.   The patient was instructed to call or go to the  emergency room with any unusual pain, swelling, redness, or if otherwise concerned.  A follow up appointment will be scheduled to evaluate response to the injection, and to assess range of motion and pain.      Large Joint Injection/Arthocentesis: R knee joint    Date/Time: 9/27/2023 5:01 PM    Performed by: Cortez Mae DO  Authorized by: Cortez Mae DO    Indications:  Pain  Needle Size:  22 G  Guidance: landmark guided    Approach:  Lateral  Location:  Knee      Medications:  40 mg triamcinolone 40 MG/ML; 4 mL lidocaine 1 %  Outcome:  Tolerated well, no immediate complications  Procedure discussed: discussed risks, benefits, and alternatives    Consent Given by:  Patient  Timeout: timeout called immediately prior to procedure    Prep: patient was prepped and draped in usual sterile fashion        It was a pleasure seeing Jimmy.    Cortez Mea DO, [facility]  Primary Care Sports Medicine

## 2023-12-12 ENCOUNTER — HOSPITAL ENCOUNTER (EMERGENCY)
Facility: CLINIC | Age: 58
Discharge: HOME | End: 2023-12-12
Attending: STUDENT IN AN ORGANIZED HEALTH CARE EDUCATION/TRAINING PROGRAM | Admitting: STUDENT IN AN ORGANIZED HEALTH CARE EDUCATION/TRAINING PROGRAM
Payer: OTHER MISCELLANEOUS

## 2023-12-12 ENCOUNTER — APPOINTMENT (OUTPATIENT)
Dept: CT IMAGING | Facility: CLINIC | Age: 58
End: 2023-12-12
Attending: STUDENT IN AN ORGANIZED HEALTH CARE EDUCATION/TRAINING PROGRAM
Payer: OTHER MISCELLANEOUS

## 2023-12-12 ENCOUNTER — APPOINTMENT (OUTPATIENT)
Dept: ULTRASOUND IMAGING | Facility: CLINIC | Age: 58
End: 2023-12-12
Attending: STUDENT IN AN ORGANIZED HEALTH CARE EDUCATION/TRAINING PROGRAM
Payer: OTHER MISCELLANEOUS

## 2023-12-12 VITALS
RESPIRATION RATE: 21 BRPM | DIASTOLIC BLOOD PRESSURE: 82 MMHG | HEART RATE: 98 BPM | OXYGEN SATURATION: 100 % | WEIGHT: 182 LBS | TEMPERATURE: 97.6 F | BODY MASS INDEX: 32.25 KG/M2 | HEIGHT: 63 IN | SYSTOLIC BLOOD PRESSURE: 137 MMHG
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
## 2023-12-12 DIAGNOSIS — M79.661 PAIN OF RIGHT LOWER LEG: ICD-10-CM

## 2023-12-12 LAB
ALBUMIN UR-MCNC: NEGATIVE MG/DL
ANION GAP SERPL CALCULATED.3IONS-SCNC: 10 MMOL/L (ref 7–15)
APPEARANCE UR: CLEAR
BASOPHILS # BLD AUTO: 0 10E3/UL (ref 0–0.2)
BASOPHILS NFR BLD AUTO: 0 %
BILIRUB UR QL STRIP: NEGATIVE
BUN SERPL-MCNC: 17.8 MG/DL (ref 6–20)
CALCIUM SERPL-MCNC: 8.9 MG/DL (ref 8.6–10)
CHLORIDE SERPL-SCNC: 100 MMOL/L (ref 98–107)
COLOR UR AUTO: ABNORMAL
CREAT SERPL-MCNC: 0.96 MG/DL (ref 0.51–0.95)
DEPRECATED HCO3 PLAS-SCNC: 28 MMOL/L (ref 22–29)
EGFRCR SERPLBLD CKD-EPI 2021: 68 ML/MIN/1.73M2
EOSINOPHIL # BLD AUTO: 0.1 10E3/UL (ref 0–0.7)
EOSINOPHIL NFR BLD AUTO: 1 %
ERYTHROCYTE [DISTWIDTH] IN BLOOD BY AUTOMATED COUNT: 13.5 % (ref 10–15)
GLUCOSE SERPL-MCNC: 102 MG/DL (ref 70–99)
GLUCOSE UR STRIP-MCNC: NEGATIVE MG/DL
HCT VFR BLD AUTO: 40.2 % (ref 35–47)
HGB BLD-MCNC: 12.8 G/DL (ref 11.7–15.7)
HGB UR QL STRIP: NEGATIVE
IMM GRANULOCYTES # BLD: 0 10E3/UL
IMM GRANULOCYTES NFR BLD: 0 %
KETONES UR STRIP-MCNC: NEGATIVE MG/DL
LEUKOCYTE ESTERASE UR QL STRIP: NEGATIVE
LYMPHOCYTES # BLD AUTO: 2.6 10E3/UL (ref 0.8–5.3)
LYMPHOCYTES NFR BLD AUTO: 33 %
MAGNESIUM SERPL-MCNC: 2.2 MG/DL (ref 1.7–2.3)
MCH RBC QN AUTO: 27.9 PG (ref 26.5–33)
MCHC RBC AUTO-ENTMCNC: 31.8 G/DL (ref 31.5–36.5)
MCV RBC AUTO: 88 FL (ref 78–100)
MONOCYTES # BLD AUTO: 0.5 10E3/UL (ref 0–1.3)
MONOCYTES NFR BLD AUTO: 7 %
MUCOUS THREADS #/AREA URNS LPF: PRESENT /LPF
NEUTROPHILS # BLD AUTO: 4.6 10E3/UL (ref 1.6–8.3)
NEUTROPHILS NFR BLD AUTO: 59 %
NITRATE UR QL: NEGATIVE
NRBC # BLD AUTO: 0 10E3/UL
NRBC BLD AUTO-RTO: 0 /100
PH UR STRIP: 6.5 [PH] (ref 5–7)
PLATELET # BLD AUTO: 325 10E3/UL (ref 150–450)
POTASSIUM SERPL-SCNC: 4.8 MMOL/L (ref 3.4–5.3)
RBC # BLD AUTO: 4.59 10E6/UL (ref 3.8–5.2)
RBC URINE: 0 /HPF
SODIUM SERPL-SCNC: 138 MMOL/L (ref 135–145)
SP GR UR STRIP: 1.02 (ref 1–1.03)
SQUAMOUS EPITHELIAL: 1 /HPF
TROPONIN T SERPL HS-MCNC: 7 NG/L
TSH SERPL DL<=0.005 MIU/L-ACNC: 2.64 UIU/ML (ref 0.3–4.2)
UROBILINOGEN UR STRIP-MCNC: NORMAL MG/DL
WBC # BLD AUTO: 7.8 10E3/UL (ref 4–11)
WBC URINE: <1 /HPF

## 2023-12-12 PROCEDURE — 85025 COMPLETE CBC W/AUTO DIFF WBC: CPT | Performed by: STUDENT IN AN ORGANIZED HEALTH CARE EDUCATION/TRAINING PROGRAM

## 2023-12-12 PROCEDURE — 84443 ASSAY THYROID STIM HORMONE: CPT | Performed by: STUDENT IN AN ORGANIZED HEALTH CARE EDUCATION/TRAINING PROGRAM

## 2023-12-12 PROCEDURE — 250N000011 HC RX IP 250 OP 636: Performed by: STUDENT IN AN ORGANIZED HEALTH CARE EDUCATION/TRAINING PROGRAM

## 2023-12-12 PROCEDURE — 93005 ELECTROCARDIOGRAM TRACING: CPT

## 2023-12-12 PROCEDURE — 70496 CT ANGIOGRAPHY HEAD: CPT

## 2023-12-12 PROCEDURE — 36415 COLL VENOUS BLD VENIPUNCTURE: CPT | Performed by: EMERGENCY MEDICINE

## 2023-12-12 PROCEDURE — 96375 TX/PRO/DX INJ NEW DRUG ADDON: CPT

## 2023-12-12 PROCEDURE — 83735 ASSAY OF MAGNESIUM: CPT | Performed by: STUDENT IN AN ORGANIZED HEALTH CARE EDUCATION/TRAINING PROGRAM

## 2023-12-12 PROCEDURE — 84484 ASSAY OF TROPONIN QUANT: CPT | Performed by: STUDENT IN AN ORGANIZED HEALTH CARE EDUCATION/TRAINING PROGRAM

## 2023-12-12 PROCEDURE — 85025 COMPLETE CBC W/AUTO DIFF WBC: CPT | Performed by: EMERGENCY MEDICINE

## 2023-12-12 PROCEDURE — 70450 CT HEAD/BRAIN W/O DYE: CPT | Mod: XU

## 2023-12-12 PROCEDURE — 80048 BASIC METABOLIC PNL TOTAL CA: CPT | Performed by: STUDENT IN AN ORGANIZED HEALTH CARE EDUCATION/TRAINING PROGRAM

## 2023-12-12 PROCEDURE — 71260 CT THORAX DX C+: CPT

## 2023-12-12 PROCEDURE — 250N000009 HC RX 250: Performed by: STUDENT IN AN ORGANIZED HEALTH CARE EDUCATION/TRAINING PROGRAM

## 2023-12-12 PROCEDURE — 81001 URINALYSIS AUTO W/SCOPE: CPT | Performed by: STUDENT IN AN ORGANIZED HEALTH CARE EDUCATION/TRAINING PROGRAM

## 2023-12-12 PROCEDURE — 93971 EXTREMITY STUDY: CPT | Mod: RT

## 2023-12-12 PROCEDURE — 96374 THER/PROPH/DIAG INJ IV PUSH: CPT | Mod: 59

## 2023-12-12 PROCEDURE — 99285 EMERGENCY DEPT VISIT HI MDM: CPT | Mod: 25

## 2023-12-12 RX ORDER — IBUPROFEN 200 MG
400 TABLET ORAL EVERY 8 HOURS PRN
Qty: 60 TABLET | Refills: 0 | Status: SHIPPED | OUTPATIENT
Start: 2023-12-12 | End: 2024-01-10

## 2023-12-12 RX ORDER — FENTANYL CITRATE 50 UG/ML
50 INJECTION, SOLUTION INTRAMUSCULAR; INTRAVENOUS ONCE
Status: COMPLETED | OUTPATIENT
Start: 2023-12-12 | End: 2023-12-12

## 2023-12-12 RX ORDER — IOPAMIDOL 755 MG/ML
500 INJECTION, SOLUTION INTRAVASCULAR ONCE
Status: COMPLETED | OUTPATIENT
Start: 2023-12-12 | End: 2023-12-12

## 2023-12-12 RX ORDER — PREDNISONE 20 MG/1
TABLET ORAL
Qty: 10 TABLET | Refills: 0 | Status: SHIPPED | OUTPATIENT
Start: 2023-12-12 | End: 2024-01-10

## 2023-12-12 RX ORDER — METHOCARBAMOL 750 MG/1
750 TABLET, FILM COATED ORAL EVERY 8 HOURS PRN
Qty: 15 TABLET | Refills: 0 | Status: SHIPPED | OUTPATIENT
Start: 2023-12-12 | End: 2024-05-24

## 2023-12-12 RX ORDER — ONDANSETRON 2 MG/ML
4 INJECTION INTRAMUSCULAR; INTRAVENOUS ONCE
Status: COMPLETED | OUTPATIENT
Start: 2023-12-12 | End: 2023-12-12

## 2023-12-12 RX ADMIN — ONDANSETRON 4 MG: 2 INJECTION INTRAMUSCULAR; INTRAVENOUS at 19:10

## 2023-12-12 RX ADMIN — FENTANYL CITRATE 50 MCG: 50 INJECTION, SOLUTION INTRAMUSCULAR; INTRAVENOUS at 19:10

## 2023-12-12 RX ADMIN — SODIUM CHLORIDE 80 ML: 9 INJECTION, SOLUTION INTRAVENOUS at 19:49

## 2023-12-12 RX ADMIN — IOPAMIDOL 100 ML: 755 INJECTION, SOLUTION INTRAVENOUS at 19:49

## 2023-12-12 ASSESSMENT — ACTIVITIES OF DAILY LIVING (ADL)
ADLS_ACUITY_SCORE: 35
ADLS_ACUITY_SCORE: 35

## 2023-12-12 NOTE — LETTER
December 12, 2023      To Whom It May Concern:      Jimmy Stevens was seen in our Emergency Department today, 12/12/23.  I expect her condition to improve by 12/18/23.  She may return to work/school when improved.    Sincerely,        Colleen MATHEWS

## 2023-12-12 NOTE — ED PROVIDER NOTES
"  History     Chief Complaint:  Fall       HPI   Jimmy Stevens is a 58 year old female who presents after fall.  Patient reports that she was walking when she began to experience tunnel vision evening, resulting in a fall.  During her fall, she landed on her right side and did hit the right side of her head.  Patient also adds that she has been dealing with right-sided weakness over the past couple of days.  She endorses pain to her right lower extremity starting at the hip, which shoots anteriorly down to her knee.  She denies any chest pressure/pain, palpitations, dysuria, fevers, abdominal pain, shortness of breath, or neck pain.  She is not on blood thinners.    Independent Historian:   Patient and daughter at bedside, who reports that patient was walking with her walker and \"went blank\".  Resulting in a fall onto her right side.  She was reportedly unconscious for a couple of seconds before coming back to consciousness by history from bystanders.    Review of External Notes:   None    Medications:    Amlodipine   Gabapentin  Lisinopril  Robaxin    Past Medical History:    Hypertension  Hyperlipidemia   Iron anemia   Lumbar and cervical spondylosis   Guillain-Weston Syndrome   Neuropathy   Chronic low back pain   Sciatica  Elevated BMI    Past Surgical History:    Salpingo-Oophorectomy   Hysterectomy   MRI brain   Breast reduction surgery  Hysterectomy      Physical Exam   Patient Vitals for the past 24 hrs:   BP Temp Temp src Pulse Resp SpO2 Height Weight   12/12/23 2150 137/82 -- -- 98 -- 100 % -- --   12/12/23 1900 (!) 153/88 -- -- 97 21 100 % -- --   12/12/23 1801 -- 97.6  F (36.4  C) Oral -- 14 100 % -- --   12/12/23 1730 (!) 153/97 -- -- 96 10 100 % -- --   12/12/23 1727 (!) 156/92 98.1  F (36.7  C) Oral 95 17 100 % 1.6 m (5' 3\") 82.6 kg (182 lb)        Physical Exam  General: Awake, alert, in moderate acute distress.  C-collar in place.  HEENT: Atraumatic   EOM normal   External ears normal   Trachea " midline  Neck: Supple, normal ROM  CV: Regular rate, regular rhythm   No murmur   No lower extremity edema  2+ radial and DP pulses  PULM: Breath sounds normal bilaterally  No wheezes or rales  ABD: Soft, non-tender, non-distended  Normal bowel sounds   No rebound or guarding   MSK: No gross deformities. Diffuse tenderness to the right thigh and calf.   NEURO: Alert, no focal deficits. 5/5 strength in bilateral upper and lower extremities except for the right lower extremity, which is 4+/5 strength although limited secondary to pain.  No drift. no gross sensory deficits.  Patient moves in a coordinated fashion.  Cranial nerves 2-12 intact.  Cerebellar testing intact.   Skin: Warm, dry and intact    Emergency Department Course   ECG  ECG results from 12/12/23   EKG 12-lead, tracing only     Value    Systolic Blood Pressure     Diastolic Blood Pressure     Ventricular Rate 97    Atrial Rate 97    WI Interval 156    QRS Duration 82        QTc 469    P Axis 70    R AXIS 29    T Axis 34    Interpretation ECG      Sinus rhythm with Premature atrial complexes  Possible Left atrial enlargement  Nonspecific T wave abnormality  Prolonged QT  Abnormal ECG  When compared with ECG of 08-MAR-2022 19:50,  Premature atrial complexes are now Present  Read by: Dr. Adalberto Nash, DO     Imaging:  US Lower Extremity Venous Duplex Right   Final Result   IMPRESSION:   1.  No deep venous thrombosis in the right lower extremity.      CT Aortic Survey w Contrast   Final Result   IMPRESSION:   1.  No evidence for aortic dissection or aneurysm.   2.  No evidence for acute pulmonary disease.   3.  No acute changes within the abdomen or pelvis.         CTA Head Neck with Contrast   Final Result   IMPRESSION:    HEAD CT:   1.  No CT evidence for acute intracranial process.   2.  Brain atrophy and presumed chronic microvascular ischemic changes as above.      HEAD CTA:    1.  No significant stenosis, aneurysm, or high flow vascular  malformation identified.   2.  Variant [place] anatomy as above.      NECK CTA:   1.  No hemodynamically significant stenosis in the neck vessels.    2.  No evidence for dissection.      Head CT w/o contrast   Final Result   IMPRESSION:    HEAD CT:   1.  No CT evidence for acute intracranial process.   2.  Brain atrophy and presumed chronic microvascular ischemic changes as above.      HEAD CTA:    1.  No significant stenosis, aneurysm, or high flow vascular malformation identified.   2.  Variant [place] anatomy as above.      NECK CTA:   1.  No hemodynamically significant stenosis in the neck vessels.    2.  No evidence for dissection.         Report per radiology    Laboratory:  Labs Ordered and Resulted from Time of ED Arrival to Time of ED Departure   BASIC METABOLIC PANEL - Abnormal       Result Value    Sodium 138      Potassium 4.8      Chloride 100      Carbon Dioxide (CO2) 28      Anion Gap 10      Urea Nitrogen 17.8      Creatinine 0.96 (*)     GFR Estimate 68      Calcium 8.9      Glucose 102 (*)    ROUTINE UA WITH MICROSCOPIC REFLEX TO CULTURE - Abnormal    Color Urine Light Yellow      Appearance Urine Clear      Glucose Urine Negative      Bilirubin Urine Negative      Ketones Urine Negative      Specific Gravity Urine 1.017      Blood Urine Negative      pH Urine 6.5      Protein Albumin Urine Negative      Urobilinogen Urine Normal      Nitrite Urine Negative      Leukocyte Esterase Urine Negative      Mucus Urine Present (*)     RBC Urine 0      WBC Urine <1      Squamous Epithelials Urine 1     TROPONIN T, HIGH SENSITIVITY - Normal    Troponin T, High Sensitivity 7     MAGNESIUM - Normal    Magnesium 2.2     TSH WITH FREE T4 REFLEX - Normal    TSH 2.64     CBC WITH PLATELETS AND DIFFERENTIAL    WBC Count 7.8      RBC Count 4.59      Hemoglobin 12.8      Hematocrit 40.2      MCV 88      MCH 27.9      MCHC 31.8      RDW 13.5      Platelet Count 325      % Neutrophils 59      %  Lymphocytes 33      % Monocytes 7      % Eosinophils 1      % Basophils 0      % Immature Granulocytes 0      NRBCs per 100 WBC 0      Absolute Neutrophils 4.6      Absolute Lymphocytes 2.6      Absolute Monocytes 0.5      Absolute Eosinophils 0.1      Absolute Basophils 0.0      Absolute Immature Granulocytes 0.0      Absolute NRBCs 0.0        Emergency Department Course & Assessments:    Interventions:  Medications   fentaNYL (PF) (SUBLIMAZE) injection 50 mcg (50 mcg Intravenous $Given 12/12/23 1910)   ondansetron (ZOFRAN) injection 4 mg (4 mg Intravenous $Given 12/12/23 1910)   iopamidol (ISOVUE-370) solution 500 mL (100 mLs Intravenous $Given 12/12/23 1949)   CT scan flush (80 mLs Intravenous $Given 12/12/23 1949)      Independent Interpretation (X-rays, CTs, rhythm strip):  I independently reviewed the patient's non-con head CT from today's visit. I do not appreciate any intracranial bleeding    Assessments/Consultations/Discussion of Management or Tests:  ED Course as of 12/13/23 0039   Tue Dec 12, 2023   1820 I evaluated the patient   2057 Rechecked. Patient is ambulatory. She also notes a recent family death, which has caused an increase in her stress. She also notes a history of sciatica, which feels similar to her right leg pain today.      Social Determinants of Health affecting care:   None    Disposition:  The patient was discharged to home.     Impression & Plan      Medical Decision Making:  Vital stable on arrival.  This patient presents with a fall in the setting of ongoing right lower extremity pain.  She has history of sciatica in years past and states that her pain feels similar.  Her pain has become so severe she is using a cane to help get around.  She had possible fall versus syncopal event although sounds like fall secondary to significant pain and weakness.  She did strike her head on a hard object.  In the setting of her head injury, possible syncope, right lower extremity weakness,  considered broad differential including stroke, aortic dissection, ACS, electrolyte abnormality, higher dysfunction, infectious.    Extensive ED workup is reassuring with negative CT imaging as above.  Ultimately, suspect lumbar radiculopathy as a cause of her right lower extremity pain.  Lower extremity ultrasound does not show evidence of DVT.  Will give her steroid burst, muscle relaxer, Tylenol and Motrin.  Asking her to follow-up with orthopedic surgery given this is her second episode of sciatica.  She already [facility].  Urine is not infected.  Patient ambulatory in the ED without assistance.  At this time, safe for discharge home.  All questions answered    Diagnosis:    ICD-10-CM    1. Fall, initial encounter  W19.XXXA       2. Pain of right lower leg  M79.661          Discharge Medications:  Discharge Medication List as of 12/12/2023  9:46 PM        START taking these medications    Details   !! ibuprofen (ADVIL/MOTRIN) 200 MG tablet Take 2 tablets (400 mg) by mouth every 8 hours as needed for pain, Disp-60 tablet, R-0, E-PrescribeTake with food      predniSONE (DELTASONE) 20 MG tablet Take two tablets (= 40mg) each day for 5 (five) days, Disp-10 tablet, R-0, E-Prescribe       !! - Potential duplicate medications found. Please discuss with provider.         Scribe Disclosure:  I, KATINA HAND, am serving as a scribe at 5:54 PM on 12/12/2023 to document services personally performed by Bryanna Pleitez DO based on my observations and the provider's statements to me.     12/12/2023   Bryanna Pleitez DO Pappas Richter, Ellen, DO  12/13/23 0039

## 2023-12-13 LAB
ATRIAL RATE - MUSE: 97 BPM
DIASTOLIC BLOOD PRESSURE - MUSE: NORMAL MMHG
INTERPRETATION ECG - MUSE: NORMAL
P AXIS - MUSE: 70 DEGREES
PR INTERVAL - MUSE: 156 MS
QRS DURATION - MUSE: 82 MS
QT - MUSE: 370 MS
QTC - MUSE: 469 MS
R AXIS - MUSE: 29 DEGREES
SYSTOLIC BLOOD PRESSURE - MUSE: NORMAL MMHG
T AXIS - MUSE: 34 DEGREES
VENTRICULAR RATE- MUSE: 97 BPM

## 2023-12-13 NOTE — ED NOTES
Passed road test. Walked by herself. Pt said only thing bothering her was her knee which is baseline for her. She has a brace at home she sometimes uses

## 2023-12-21 ENCOUNTER — TELEPHONE (OUTPATIENT)
Dept: INTERNAL MEDICINE | Facility: CLINIC | Age: 58
End: 2023-12-21
Payer: COMMERCIAL

## 2023-12-21 NOTE — TELEPHONE ENCOUNTER
Patient needs to be informed that pain medicine will not be prescribed without being seen in the clinic for an assessment.

## 2023-12-21 NOTE — TELEPHONE ENCOUNTER
"S-(situation):     Patient calling requesting medication for pain     B-(background):     Seen in ED on 12/12/23 after fall, R sided pain    See ED notes on 12/12/23:    \"Extensive ED workup is reassuring with negative CT imaging as above.  Ultimately, suspect lumbar radiculopathy as a cause of her right lower extremity pain.  Lower extremity ultrasound does not show evidence of DVT.  Will give her steroid burst, muscle relaxer, Tylenol and Motrin.  Asking her to follow-up with orthopedic surgery given this is her second episode of sciatica\"    A-(assessment):     Patient is still experiencing pain on entire R side of body and left lower back     Rating pain at 7-8/10    Finished prednisone, Taking ibuprofen & Methocarbamol, tylenol, topical icy hot, warm showers - not helping     Denies new or worsening symptoms but not getting better. Pain was improved while on prednisone which has now been completed     R-(recommendations):     Patient is scheduled for upcoming neurology appointment on 1/15/24 but has not scheduled with [facility].     Writer advised that if patient's symptoms are persisting and not improving, would need to be re-evaluated in person. Advised [facility] urgent care or [facility] urgent care. Patient is requesting for message to be sent to Dr. Calderon to advise. Routing to PCP to please advise if patient needs re-evaluation or other recommendations?    Callback [phone] - ok to leave detailed VM     Fabian Espinoza RN  [facility]  "

## 2023-12-21 NOTE — TELEPHONE ENCOUNTER
Called and left pt a detailed message with Dr Calderon's message from below. Advised pt to call back with questions or to schedule an appt.

## 2024-01-05 NOTE — CONFIDENTIAL NOTE
NEUROSURGERY- NEW PREVISIT PLANNING       Record Status/Location     Referring Provider  Self   Diagnosis 12/12/23 ED Note Left low back pain with sciatica    MRI (HEAD, NECK, SPINE) Pacs L, T, C 2/28/22 [facility]   CT Na    X-ray Pacs Pelvis 7/10/23 [facility] [place]   INJECTION Na    PHYSICAL THERAPY Encounters 2022 x 2   SURGERY Na

## 2024-01-10 ENCOUNTER — VIRTUAL VISIT (OUTPATIENT)
Dept: INTERNAL MEDICINE | Facility: CLINIC | Age: 59
End: 2024-01-10
Payer: OTHER MISCELLANEOUS

## 2024-01-10 DIAGNOSIS — G62.9 NEUROPATHY: ICD-10-CM

## 2024-01-10 DIAGNOSIS — E66.01 MORBID OBESITY (H): ICD-10-CM
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
## 2024-01-10 DIAGNOSIS — M54.42 ACUTE BILATERAL LOW BACK PAIN WITH LEFT-SIDED SCIATICA: Primary | ICD-10-CM

## 2024-01-10 DIAGNOSIS — I10 ESSENTIAL HYPERTENSION, BENIGN: ICD-10-CM

## 2024-01-10 PROCEDURE — 99214 OFFICE O/P EST MOD 30 MIN: CPT | Mod: 95 | Performed by: INTERNAL MEDICINE

## 2024-01-10 RX ORDER — ACETAMINOPHEN 500 MG
1000 TABLET ORAL EVERY 8 HOURS PRN
Status: ON HOLD | COMMUNITY
End: 2024-05-22

## 2024-01-10 RX ORDER — GABAPENTIN 300 MG/1
600 CAPSULE ORAL 3 TIMES DAILY
Qty: 180 CAPSULE | Refills: 1 | Status: CANCELLED | OUTPATIENT
Start: 2024-01-10

## 2024-01-10 RX ORDER — AMLODIPINE BESYLATE 5 MG/1
5 TABLET ORAL DAILY
Qty: 90 TABLET | Refills: 1 | Status: ON HOLD | OUTPATIENT
Start: 2024-01-10 | End: 2024-05-22

## 2024-01-10 RX ORDER — METHOCARBAMOL 750 MG/1
750 TABLET, FILM COATED ORAL EVERY 8 HOURS PRN
Qty: 15 TABLET | Refills: 0 | Status: CANCELLED | OUTPATIENT
Start: 2024-01-10

## 2024-01-10 NOTE — PROGRESS NOTES
"Jimmy is a 58 year old who is being evaluated via a billable video visit.      How would you like to obtain your AVS? MyChart  If the video visit is dropped, the invitation should be resent by: Text to cell phone: [phone]  Will anyone else be joining your video visit? No          Assessment & Plan     Acute bilateral low back pain with left-sided sciatica  Continue with supportive care.  Patient will be following left with her neurosurgeon accordingly.  I discussed patient that would not be prescribing any further narcotics for her.  She is getting her gabapentin and methocarbamol from a secondary source Per PDMP review  I also informed patient I would not be in favor of ongoing steroid use.    Fall, subsequent encounter  Reviewed emergency room note with patient.    Neuropathy  Noted as baseline history    Morbid obesity (H)  Encourage ongoing weight loss    Essential hypertension, benign  Stable on therapy and continuing with medical management  - amLODIPine (NORVASC) 5 MG tablet; Take 1 tablet (5 mg) by mouth daily       MED REC REQUIRED  Post Medication Reconciliation Status: discharge medications reconciled, continue medications without change  BMI:   Estimated body mass index is 32.24 kg/m  as calculated from the following:    Height as of 12/12/23: 1.6 m (5' 3\").    Weight as of 12/12/23: 82.6 kg (182 lb).   Weight management plan: Discussed healthy diet and exercise guidelines    Work on weight loss  Regular exercise    Nick Calderon MD  [facility]   Jimmy is a 58 year old, presenting for the following health issues:  ER F/U    HPI     ED/UC Followup:    Facility:  [facility] ER  Date of visit: 12/12/23  Reason for visit: Fall, pain of right lower leg   Current Status: Continues to experience pain on right side of entire body and left lower back. Pain was improved while taking steroid. Using tylenol 1,000 mg twice daily. Seeing neurosurgery on " 1/15/24     She underwent an extensive evaluation in the emergency room which was essentially negative with noted CT imaging, metabolic and EKG assessment.  It was suspected that she had a component of her acute on chronic sciatica.  She was given a steroid burst as well as muscle relaxers Tylenol and Motrin and asked to follow-up with her orthopedist accordingly.    Patient is well-known to me having had been seen numerous times in the past and has had a extensive history of health issues as well as somatic concerns.    Review of Systems   CONSTITUTIONAL: NEGATIVE for fever, chills, change in weight  EYES: NEGATIVE for vision changes or irritation  ENT/MOUTH: NEGATIVE for ear, mouth and throat problems  RESP: NEGATIVE for significant cough or SOB  CV: NEGATIVE for chest pain, palpitations or peripheral edema  GI: NEGATIVE for nausea, abdominal pain, heartburn, or change in bowel habits  HEME: NEGATIVE for bleeding problems      Objective         Vitals:  No vitals were obtained today due to virtual visit.    Physical Exam   GENERAL:  alert and no distress  EYES: Eyes grossly normal to inspection.  No discharge or erythema, or obvious scleral/conjunctival abnormalities.  RESP: No audible wheeze, cough, or visible cyanosis.  No visible retractions or increased work of breathing.    SKIN: Visible skin clear. No significant rash, abnormal pigmentation or lesions.  NEURO: Cranial nerves grossly intact.  Mentation and speech appropriate for age.  PSYCH: Mentation appears normal, affect normal/bright, judgement and insight intact, normal speech and appearance well-groomed.        Video-Visit Details    Type of service:  Video Visit     Originating Location (pt. Location): Home    Distant Location (provider location):  On-site  Platform used for Video Visit: [employer]

## 2024-01-15 ENCOUNTER — PRE VISIT (OUTPATIENT)
Dept: NEUROSURGERY | Facility: CLINIC | Age: 59
End: 2024-01-15

## 2024-04-15 NOTE — PROGRESS NOTES
[facility]  Orthopaedic Surgery Consultation by Juan Rosales M.D.    Jimmy Stevens MRN# [id number]   Age: 58 year old YOB: 1965     Requesting physician: Nick Landrum     Background history:  DX:  Hyperlipidemia  Hypertension  Neuropathy  Chronic low back pain  Chronic right knee pain  Morbid obesity     TREATMENTS:  9/27/23, right kne CSI, Dr. Mae           History of Present Illness:   58 year old female who presents today for chronic right knee pain.  She has had numerous years of ongoing right knee pain which has been progressive over time.  She started using a cane to aid in her ambulation approximately 3 years ago.  She has had multiple falls due to the knee buckling and intermittent pain episodes.  She has had multiple corticosteroid injections with diminishing benefit.  Pain is felt throughout the entire knee.  She reports night pain, initiation stiffness and soreness.  She reports occasional effusions, crepitus and instability.  The last one was September of 2023.  She has tried over-the-counter pain medications, activity modification, as well as creams.  She also also tried physical therapy without significant relief.  The pain has been significantly affecting her wellbeing, activities of daily living, and things that she enjoys doing.    Current symptoms:  Problem: right knee pain  Onset and duration: began after a fall in 2016  Awakens from sleep due to sx's:  Yes  Precipitating Injury:  Yes    Other joints or sites painful:  Yes    Social:   Occupation: [employer], [employer]   Living situation: lives with her daughter  Hobbies / Sports: cooking, shopping    Smoking: No  Alcohol: No  Illicit drug use: No         Physical Exam:     EXAMINATION pertinent findings:   PSYCH: Pleasant, healthy-appearing, alert, oriented x3, cooperative. Normal mood and affect.  VITAL SIGNS: not currently breastfeeding.  Reviewed nursing intake notes.   There is no height  or weight on file to calculate BMI.  RESP: non labored breathing   ABD: benign, soft, non-tender, no acute peritoneal findings  SKIN: grossly normal   LYMPHATIC: grossly normal, no adenopathy, no extremity edema  NEURO: grossly normal , no motor deficits  VASCULAR: satisfactory perfusion of all extremities   MUSCULOSKELETAL:   Alignment: Varus alignment of bilateral lower extremities  Gait: Antalgic gait  Knees:   L knee: -2-0 . Straight leg raise +. No redness, warmth or skin changes present. Effusion No. Ligamentously stable in both ML and AP direction. Normal PF tracking with crepitus. Tenderness to palpation over medial and lateral joint lines.    R knee: -0-0 . Straight leg raise +. No redness, warmth or skin changes present. Effusion No. Ligamentously stable in both ML and AP direction. Normal PF tracking with crepitus. Tenderness to palpation over medial and lateral joint lines.     BLE:   Thigh and leg compartments soft and compressible   +Quad/TA/GSC/FHL/EHL   SILT DP/SP/Angelica/Saph/Tib nerve distributions   Palpable dorsalis pedis pulse          Data:   All laboratory data reviewed  All imaging studies reviewed by me personally.    X-ray of right knee 4/17/2024:  My interpretation: Severe degenerative changes and joint space narrowing of the several tibial compartments with complete obliteration of the medial compartment.  There is significant osteophytes, sclerosis, and subchondral cyst.  There is also severe joint space narrowing and obliteration of the patellofemoral joint with significant osteophytic changes, sclerosis and subchondral cyst.    Standing leg length x-rays 4/17/2024:  My interpretation: Varus alignment of bilateral lower extremities about the knee.  Leg lengths symmetric.         Assessment and Plan:   Assessment:  58-year-old female with chronic right knee pain due to end-stage osteoarthritic changes in all 3 compartments.  Insufficiently responding to nonsurgical treatment  options.     Plan:  I had a long discussion with the patient regarding etiology and ongoing management options.  Reviewed surgical and nonsurgical treatments.  The non-surgical options include activity modification, pain medication, PT, bracing and injection therapy.  At this point patient has exhausted all nonsurgical treatment options.  As for surgery we discussed total knee replacement surgery. We reviewed total knee replacement in detail including the procedure, the implants, the recovery process, and long-term outcomes.  We reviewed that the risks of the surgery include but are not limited to infection, wound problems, stiffness, persistent pain, swelling, clicking, loosening, revision surgery.  We also reviewed less common risks such as neurovascular injury fracture, and other implant-related issues.  We reviewed other medical complications such as a blood clot.  We discussed that the vast majority of cases have a highly successful outcome.  However there is a small subset of patients that do experience complications or problems following the knee replacement and these problems can be very debilitating and painful and sometimes do not improve.   Based on a discussion of the risks and benefits, we believe that the benefits far outweigh the risks at this point and the patient would like to proceed with right total knee arthroplasty surgery.  We will work on scheduling surgery at a time that works well for patient in the next few months.  Patient will contact us if there are any questions or concerns leading up to surgery. Before surgery the patient will attend a joint replacement class and will be seen by the PCP/anesthesiologist and dentist.    For additional information and frequently asked questions regarding joint replacements, scan the QR code image below on your phone camera or go to: [email or web address]            Patient seen and examined with   Connie Zambrano MD  Orthopedic Surgery, PGY-4    Thank you for your referral.    Juan Rosales MD, PhD     Adult Reconstruction  [facility] Department of Orthopaedic Surgery    This note was created using dictation software and may contain errors.  Please contact the creator for any clarifications that are needed.    DATA for DOCUMENTATION:         Past Medical History:     Patient Active Problem List   Diagnosis    iamCERVICALGIA    iamTENSION HEADACHE    iamPAIN IN LIMB    Acute stress reaction    Knee pain    HYPERLIPIDEMIA LDL GOAL <160    Essential hypertension, benign    Obesity (BMI 35.0-39.9) with comorbidity (H)    Chronic bilateral low back pain without sciatica    Neuropathy    MGUS (monoclonal gammopathy of unknown significance)     Past Medical History:   Diagnosis Date    Acute stress reaction 9/9/2009    Essential hypertension, benign 3/15/2016    Hyperlipidemia LDL goal <160 10/31/2010    Iron deficiency anemia, unspecified     Knee pain 9/9/2009       Also see scanned health assessment forms.       Past Surgical History:     Past Surgical History:   Procedure Laterality Date    [facility] REMOVAL OF OVARY/TUBE(S)  3/99    Salpingo-Oophorectomy, Unilateral    HYSTERECTOMY, PAP NO LONGER INDICATED  3/99    [facility] NONSPECIFIC PROCEDURE  10/29/99    MRI brain normal    [facility] NONSPECIFIC PROCEDURE  02/00    B breast reduction surgery    [facility] TOTAL ABDOM HYSTERECTOMY  3/99    Hysterectomy, Total Abdominal            Social History:     Social History     Socioeconomic History    Marital status:      Spouse name: Not on file    Number of children: Not on file    Years of education: Not on file    Highest education level: Not on file   Occupational History    Occupation: Business Teller     Employer: [employer]   Tobacco Use    Smoking status: Never    Smokeless tobacco: Never   Vaping Use    Vaping status: Never Used   Substance and Sexual Activity    Alcohol use:  No    Drug use: No    Sexual activity: Yes     Partners: Male   Other Topics Concern     Service Not Asked    Blood Transfusions Not Asked    Caffeine Concern Not Asked    Occupational Exposure Not Asked    Hobby Hazards Not Asked    Sleep Concern Not Asked    Stress Concern Not Asked    Weight Concern Not Asked    Special Diet Not Asked    Back Care Not Asked    Exercise Yes    Bike Helmet Not Asked    Seat Belt Not Asked    Self-Exams Not Asked    Parent/sibling w/ CABG, MI or angioplasty before 65F 55M? Not Asked   Social History Narrative    Not on file     Social Determinants of Health     Financial Resource Strain: Not on file   Food Insecurity: Not on file   Transportation Needs: Not on file   Physical Activity: Not on file   Stress: Not on file   Social Connections: Not on file   Interpersonal Safety: Not At Risk (6/15/2023)    Humiliation, Afraid, Rape, and Kick questionnaire     Fear of Current or Ex-Partner: No     Emotionally Abused: No     Physically Abused: No     Sexually Abused: No   Housing Stability: Not on file            Family History:       Family History   Problem Relation Age of Onset    Genitourinary Problems Mother         B:1942 Alive    Family History Negative Father         B:1940 Alive    Family History Negative Sister         5 sisters all healthy    Family History Negative Brother         1 brother healthy            Medications:     Current Outpatient Medications   Medication Sig Dispense Refill    acetaminophen (TYLENOL) 500 MG tablet Take 1,000 mg by mouth every 8 hours as needed for mild pain      amLODIPine (NORVASC) 5 MG tablet Take 1 tablet (5 mg) by mouth daily 90 tablet 1    gabapentin (NEURONTIN) 300 MG capsule Take 2 capsules (600 mg) by mouth 3 times daily 180 capsule 1    ibuprofen (ADVIL/MOTRIN) 200 MG tablet Take 400 mg by mouth every 6 hours as needed for mild pain (Patient not taking: Reported on 1/10/2024)      lisinopril (ZESTRIL) 20 MG tablet TAKE 1 TABLET  BY MOUTH TWICE A  tablet 1    methocarbamol (ROBAXIN) 750 MG tablet Take 1 tablet (750 mg) by mouth every 8 hours as needed for muscle spasms 15 tablet 0     Current Facility-Administered Medications   Medication Dose Route Frequency Provider Last Rate Last Admin    lidocaine 1 % injection 4 mL  4 mL   Cortez Mae, DO   4 mL at 09/27/23 1701    lidocaine 1 % injection 4 mL  4 mL   Cortez Mae, DO   4 mL at 08/24/22 1750    triamcinolone (KENALOG-40) injection 40 mg  40 mg   Cortez Mae, DO   40 mg at 09/27/23 1701    triamcinolone (KENALOG-40) injection 40 mg  40 mg   Cortez Mae, DO   40 mg at 08/24/22 1750              Review of Systems:   A comprehensive 10 point review of systems (constitutional, ENT, cardiac, peripheral vascular, lymphatic, respiratory, GI, , Musculoskeletal, skin, Neurological) was performed and found to be negative except as described in this note.     See intake form completed by patient

## 2024-04-17 ENCOUNTER — ANCILLARY PROCEDURE (OUTPATIENT)
Dept: GENERAL RADIOLOGY | Facility: CLINIC | Age: 59
End: 2024-04-17
Attending: STUDENT IN AN ORGANIZED HEALTH CARE EDUCATION/TRAINING PROGRAM
Payer: COMMERCIAL

## 2024-04-17 ENCOUNTER — OFFICE VISIT (OUTPATIENT)
Dept: ORTHOPEDICS | Facility: CLINIC | Age: 59
End: 2024-04-17
Attending: FAMILY MEDICINE
Payer: COMMERCIAL

## 2024-04-17 DIAGNOSIS — M17.11 PRIMARY OSTEOARTHRITIS OF RIGHT KNEE: ICD-10-CM

## 2024-04-17 PROCEDURE — 73560 X-RAY EXAM OF KNEE 1 OR 2: CPT | Mod: TC | Performed by: RADIOLOGY

## 2024-04-17 PROCEDURE — 99204 OFFICE O/P NEW MOD 45 MIN: CPT | Mod: GC | Performed by: STUDENT IN AN ORGANIZED HEALTH CARE EDUCATION/TRAINING PROGRAM

## 2024-04-17 PROCEDURE — 73562 X-RAY EXAM OF KNEE 3: CPT | Mod: TC | Performed by: RADIOLOGY

## 2024-04-17 PROCEDURE — 77073 BONE LENGTH STUDIES: CPT | Mod: TC | Performed by: RADIOLOGY

## 2024-04-17 RX ORDER — TRANEXAMIC ACID 650 MG/1
1950 TABLET ORAL ONCE
Status: CANCELLED | OUTPATIENT
Start: 2024-04-17 | End: 2024-04-17

## 2024-04-17 ASSESSMENT — KOOS JR
KOOS JR SCORING: 36.93
STANDING UPRIGHT: SEVERE
RISING FROM SITTING: SEVERE
TWISING OR PIVOTING ON KNEE: SEVERE
BENDING TO THE FLOOR TO PICK UP OBJECT: MODERATE
STRAIGHTENING KNEE FULLY: MILD
HOW SEVERE IS YOUR KNEE STIFFNESS AFTER FIRST WAKING IN MORNING: EXTREME
GOING UP OR DOWN STAIRS: EXTREME

## 2024-04-17 NOTE — LETTER
4/17/2024         RE: Jimmy Stevens  [address]  [place] [place] [address]        Dear Colleague,    Thank you for referring your patient, Jimmy Stevens, to the [facility] [place]. Please see a copy of my visit note below.        [facility]  Orthopaedic Surgery Consultation by Juan Rosales M.D.    Jimmy Stevens MRN# [id number]   Age: 58 year old YOB: 1965     Requesting physician: Nick Landrum     Background history:  DX:  Hyperlipidemia  Hypertension  Neuropathy  Chronic low back pain  Chronic right knee pain  Morbid obesity     TREATMENTS:  9/27/23, right kne CSI, Dr. Mae           History of Present Illness:   58 year old female who presents today for chronic right knee pain.  She has had numerous years of ongoing right knee pain which has been progressive over time.  She started using a cane to aid in her ambulation approximately 3 years ago.  She has had multiple falls due to the knee buckling and intermittent pain episodes.  She has had multiple corticosteroid injections with diminishing benefit.  Pain is felt throughout the entire knee.  She reports night pain, initiation stiffness and soreness.  She reports occasional effusions, crepitus and instability.  The last one was September of 2023.  She has tried over-the-counter pain medications, activity modification, as well as creams.  She also also tried physical therapy without significant relief.  The pain has been significantly affecting her wellbeing, activities of daily living, and things that she enjoys doing.    Current symptoms:  Problem: right knee pain  Onset and duration: began after a fall in 2016  Awakens from sleep due to sx's:  Yes  Precipitating Injury:  Yes    Other joints or sites painful:  Yes    Social:   Occupation: [employer], [employer]   Living situation: lives with her daughter  Hobbies / Sports: cooking, shopping    Smoking: No  Alcohol:  No  Illicit drug use: No         Physical Exam:     EXAMINATION pertinent findings:   PSYCH: Pleasant, healthy-appearing, alert, oriented x3, cooperative. Normal mood and affect.  VITAL SIGNS: not currently breastfeeding.  Reviewed nursing intake notes.   There is no height or weight on file to calculate BMI.  RESP: non labored breathing   ABD: benign, soft, non-tender, no acute peritoneal findings  SKIN: grossly normal   LYMPHATIC: grossly normal, no adenopathy, no extremity edema  NEURO: grossly normal , no motor deficits  VASCULAR: satisfactory perfusion of all extremities   MUSCULOSKELETAL:   Alignment: Varus alignment of bilateral lower extremities  Gait: Antalgic gait  Knees:   L knee: -2-0 . Straight leg raise +. No redness, warmth or skin changes present. Effusion No. Ligamentously stable in both ML and AP direction. Normal PF tracking with crepitus. Tenderness to palpation over medial and lateral joint lines.    R knee: -0-0 . Straight leg raise +. No redness, warmth or skin changes present. Effusion No. Ligamentously stable in both ML and AP direction. Normal PF tracking with crepitus. Tenderness to palpation over medial and lateral joint lines.     BLE:   Thigh and leg compartments soft and compressible   +Quad/TA/GSC/FHL/EHL   SILT DP/SP/Angelica/Saph/Tib nerve distributions   Palpable dorsalis pedis pulse          Data:   All laboratory data reviewed  All imaging studies reviewed by me personally.    X-ray of right knee 4/17/2024:  My interpretation: Severe degenerative changes and joint space narrowing of the several tibial compartments with complete obliteration of the medial compartment.  There is significant osteophytes, sclerosis, and subchondral cyst.  There is also severe joint space narrowing and obliteration of the patellofemoral joint with significant osteophytic changes, sclerosis and subchondral cyst.    Standing leg length x-rays 4/17/2024:  My interpretation: Varus alignment of  bilateral lower extremities about the knee.  Leg lengths symmetric.         Assessment and Plan:   Assessment:  58-year-old female with chronic right knee pain due to end-stage osteoarthritic changes in all 3 compartments.  Insufficiently responding to nonsurgical treatment options.     Plan:  I had a long discussion with the patient regarding etiology and ongoing management options.  Reviewed surgical and nonsurgical treatments.  The non-surgical options include activity modification, pain medication, PT, bracing and injection therapy.  At this point patient has exhausted all nonsurgical treatment options.  As for surgery we discussed total knee replacement surgery. We reviewed total knee replacement in detail including the procedure, the implants, the recovery process, and long-term outcomes.  We reviewed that the risks of the surgery include but are not limited to infection, wound problems, stiffness, persistent pain, swelling, clicking, loosening, revision surgery.  We also reviewed less common risks such as neurovascular injury fracture, and other implant-related issues.  We reviewed other medical complications such as a blood clot.  We discussed that the vast majority of cases have a highly successful outcome.  However there is a small subset of patients that do experience complications or problems following the knee replacement and these problems can be very debilitating and painful and sometimes do not improve.   Based on a discussion of the risks and benefits, we believe that the benefits far outweigh the risks at this point and the patient would like to proceed with right total knee arthroplasty surgery.  We will work on scheduling surgery at a time that works well for patient in the next few months.  Patient will contact us if there are any questions or concerns leading up to surgery. Before surgery the patient will attend a joint replacement class and will be seen by the PCP/anesthesiologist and  dentist.    For additional information and frequently asked questions regarding joint replacements, scan the QR code image below on your phone camera or go to: [email or web address]            Patient seen and examined with Dr. Connie Zambrano MD  Orthopedic Surgery, PGY-4    Thank you for your referral.    Juan Rosales MD, PhD     Adult Reconstruction  [facility] Department of Orthopaedic Surgery    This note was created using dictation software and may contain errors.  Please contact the creator for any clarifications that are needed.    DATA for DOCUMENTATION:         Past Medical History:     Patient Active Problem List   Diagnosis     iamCERVICALGIA     iamTENSION HEADACHE     iamPAIN IN LIMB     Acute stress reaction     Knee pain     HYPERLIPIDEMIA LDL GOAL <160     Essential hypertension, benign     Obesity (BMI 35.0-39.9) with comorbidity (H)     Chronic bilateral low back pain without sciatica     Neuropathy     MGUS (monoclonal gammopathy of unknown significance)     Past Medical History:   Diagnosis Date     Acute stress reaction 9/9/2009     Essential hypertension, benign 3/15/2016     Hyperlipidemia LDL goal <160 10/31/2010     Iron deficiency anemia, unspecified      Knee pain 9/9/2009       Also see scanned health assessment forms.       Past Surgical History:     Past Surgical History:   Procedure Laterality Date      REMOVAL OF OVARY/TUBE(S)  3/99    Salpingo-Oophorectomy, Unilateral     HYSTERECTOMY, PAP NO LONGER INDICATED  3/99     [facility] NONSPECIFIC PROCEDURE  10/29/99    MRI brain normal     [facility] NONSPECIFIC PROCEDURE  02/00    B breast reduction surgery     [facility] TOTAL ABDOM HYSTERECTOMY  3/99    Hysterectomy, Total Abdominal            Social History:     Social History     Socioeconomic History     Marital status:      Spouse name: Not on file     Number of children: Not on file     Years of  education: Not on file     Highest education level: Not on file   Occupational History     Occupation: Business Teller     Employer: [employer]   Tobacco Use     Smoking status: Never     Smokeless tobacco: Never   Vaping Use     Vaping status: Never Used   Substance and Sexual Activity     Alcohol use: No     Drug use: No     Sexual activity: Yes     Partners: Male   Other Topics Concern      Service Not Asked     Blood Transfusions Not Asked     Caffeine Concern Not Asked     Occupational Exposure Not Asked     Hobby Hazards Not Asked     Sleep Concern Not Asked     Stress Concern Not Asked     Weight Concern Not Asked     Special Diet Not Asked     Back Care Not Asked     Exercise Yes     Bike Helmet Not Asked     Seat Belt Not Asked     Self-Exams Not Asked     Parent/sibling w/ CABG, MI or angioplasty before 65F 55M? Not Asked   Social History Narrative     Not on file     Social Determinants of Health     Financial Resource Strain: Not on file   Food Insecurity: Not on file   Transportation Needs: Not on file   Physical Activity: Not on file   Stress: Not on file   Social Connections: Not on file   Interpersonal Safety: Not At Risk (6/15/2023)    Humiliation, Afraid, Rape, and Kick questionnaire      Fear of Current or Ex-Partner: No      Emotionally Abused: No      Physically Abused: No      Sexually Abused: No   Housing Stability: Not on file            Family History:       Family History   Problem Relation Age of Onset     Genitourinary Problems Mother         B:1942 Alive     Family History Negative Father         B:1940 Alive     Family History Negative Sister         5 sisters all healthy     Family History Negative Brother         1 brother healthy            Medications:     Current Outpatient Medications   Medication Sig Dispense Refill     acetaminophen (TYLENOL) 500 MG tablet Take 1,000 mg by mouth every 8 hours as needed for mild pain       amLODIPine (NORVASC) 5 MG tablet Take 1 tablet (5  mg) by mouth daily 90 tablet 1     gabapentin (NEURONTIN) 300 MG capsule Take 2 capsules (600 mg) by mouth 3 times daily 180 capsule 1     ibuprofen (ADVIL/MOTRIN) 200 MG tablet Take 400 mg by mouth every 6 hours as needed for mild pain (Patient not taking: Reported on 1/10/2024)       lisinopril (ZESTRIL) 20 MG tablet TAKE 1 TABLET BY MOUTH TWICE A  tablet 1     methocarbamol (ROBAXIN) 750 MG tablet Take 1 tablet (750 mg) by mouth every 8 hours as needed for muscle spasms 15 tablet 0     Current Facility-Administered Medications   Medication Dose Route Frequency Provider Last Rate Last Admin     lidocaine 1 % injection 4 mL  4 mL   Cortez Mae, DO   4 mL at 09/27/23 1701     lidocaine 1 % injection 4 mL  4 mL   Cortez Mae, DO   4 mL at 08/24/22 1750     triamcinolone (KENALOG-40) injection 40 mg  40 mg   Cortez Mae, DO   40 mg at 09/27/23 1701     triamcinolone (KENALOG-40) injection 40 mg  40 mg   Cortez Mae, DO   40 mg at 08/24/22 1750              Review of Systems:   A comprehensive 10 point review of systems (constitutional, ENT, cardiac, peripheral vascular, lymphatic, respiratory, GI, , Musculoskeletal, skin, Neurological) was performed and found to be negative except as described in this note.     See intake form completed by patient         Again, thank you for allowing me to participate in the care of your patient.        Sincerely,        Juan Rosales MD

## 2024-04-17 NOTE — PATIENT INSTRUCTIONS
1. Primary osteoarthritis of right knee          Schedule  surgery.   Surgery Scheduler will contact you to assist with scheduling surgery.   You can contact her directly at [phone].   Prior to your scheduled surgery we advise scheduling with your dentist to obtain clearance for surgery, and to complete any recommended dental work prior.     Pre-operative Physical needed within 30 days of scheduled proceedure  Physical Therapy will be scheduled     For mor information regarding total joint surgery please visit our website:   [email or web address]    FORMS:   If you are needing any forms completed relating to your upcoming procedure, please send them to our office with a completed Release of Information.   Forms will be completed AFTER your procedure. A letter can be sent to your employer prior to surgery to inform them of your anticipated time off.    Please notify our staff if you would like a letter to do so.   Forms can be faxed directly to our clinic at [phone].     DO NOT BRING FORMS ON THE DATE OF SURGERY.           Call my office with any questions or concerns, [phone].

## 2024-04-18 ENCOUNTER — TELEPHONE (OUTPATIENT)
Dept: ORTHOPEDICS | Facility: CLINIC | Age: 59
End: 2024-04-18

## 2024-04-18 NOTE — TELEPHONE ENCOUNTER
Patient has been scheduled for surgery. Details are below.    Date of Surgery: 05/21/24      Approximate Arrival Time: SURGERY CENTER WILL CALL 3/4 DAYS PRIOR TO CONFIRM A TIME   Surgeon: Dr. Juan Rosales    Procedure: ARTHROPLASTY KNEE TOTAL, RIGHT  Location: [facility], [address] [place] [address]  Surgery Consult: 05/1/24  PreOp Physical: 05/06/24  PostOp: 06/06 ^ 07/12/24  Packet Mailed/MyChart Sent: YES  Added to [place]: YES    Spoke to: VERNELL

## 2024-04-23 RX ORDER — GABAPENTIN 300 MG/1
600 CAPSULE ORAL 3 TIMES DAILY
COMMUNITY

## 2024-04-23 RX ORDER — TURMERIC 100 %
POWDER (GRAM) MISCELLANEOUS DAILY
COMMUNITY

## 2024-04-23 NOTE — PROVIDER NOTIFICATION
04/23/24 1405   Discharge Planning   Patient/Family Anticipates Transition to home with family   Concerns to be Addressed all concerns addressed in this encounter;no discharge needs identified   Living Arrangements   People in Home child(lazara), adult   Type of Residence Private Residence   Is your private residence a single family home or apartment? Apartment   Number of Stairs, Within Home, Primary greater than 10 stairs  (lives on 3rd floor, no elevator)   Stair Railings, Within Home, Primary railings safe and in good condition   Once home, are you able to live on one level? Yes   Which level? Main Level   Bathroom Shower/Tub Tub/Shower unit   Equipment Currently Used at Home cane, straight;walker, standard   Support System   Support Systems Family Members;Children   Do you have someone available to stay with you one or two nights once you are home? Yes   Medical Clearance   Date of Physical 05/06/24   [facility]   It is recommended that you call and check with any specialty providers before surgery to see if you need surgical clearance.  Do you see any specialty providers outside of your primary care provider? No   Blood   Known Bleeding Disorder or Coagulopathy No   Does the patient have any Oriental orthodox/cultural preferences related to blood products? No   Education   Has the patient scheduled or completed pre-op total joint education, either in class or online, in the last 12 months? Yes   What day did the patient complete, or plan to complete, pre-op total joint education? 05/01/24   Patient attended total joint pre-op class/received pre-op teaching  online   Relationship/Living Environment   Name(s) of People in Home Alek/brent

## 2024-05-01 ENCOUNTER — TELEPHONE (OUTPATIENT)
Dept: ORTHOPEDICS | Facility: CLINIC | Age: 59
End: 2024-05-01

## 2024-05-01 ENCOUNTER — VIRTUAL VISIT (OUTPATIENT)
Dept: ORTHOPEDICS | Facility: CLINIC | Age: 59
End: 2024-05-01
Payer: COMMERCIAL

## 2024-05-01 DIAGNOSIS — M17.11 OSTEOARTHRITIS OF RIGHT KNEE: Primary | ICD-10-CM

## 2024-05-01 DIAGNOSIS — Z96.641 S/P TOTAL RIGHT HIP ARTHROPLASTY: ICD-10-CM

## 2024-05-01 PROCEDURE — 99207 PR NO CHARGE NURSE ONLY: CPT | Mod: 93

## 2024-05-01 NOTE — TELEPHONE ENCOUNTER
Reason for Call:  Form, our goal is to have forms completed with 7 days, however, some forms may require a visit or additional information.    Type of letter, form or note:   Health Care Provider Medical Certification      Who is the form from?:  [facility]     Where did the form come from: form was faxed in    How will form be returned?:  fax to Pickie at fax #: [phone]    Has the patient signed a consent form for release of information (may be included with form)?  unknown    Form was started and place in accordion folder labeled forms for provider review/ completion at [facility].

## 2024-05-01 NOTE — PROGRESS NOTES
This is a non-billable nursing telephone visit.    Teaching Flowsheet   Relevant Diagnosis: right knee osteoarthritis  Teaching Topic: right total knee arthroplasty    Patient's brother will be driving her home from the hospital because his vehicle is easier for her to get in/out of.  Her daughter plans to take FMLA to help out during the recovery phase.  Fax# provided to patient so her daughter can send FMLA paperwork to.  Discussed total joint online class.  Patient will call if they require help for signing up for the total joint zoom class. Patient understands they will need a preop exam within 30 days of date of surgery. Patient understands the expected length of stay and the expectation of outpatient physical therapy. Patient understands the need for an at home  after surgery and need for transportation home.  Discussed dental/non-sterile procedure prophylaxis. Discussed the [facility] Care Companion service. Discussed stoplight tool and how to use it with patient.       Person(s) involved in teaching:   Patient     Motivation Level:  Asks Questions: Yes  Eager to Learn: Yes  Cooperative: Yes  Receptive (willing/able to accept information): Yes  Any cultural factors/Nondenominational beliefs that may influence understanding or compliance? No  Comments: none     Patient demonstrates understanding of the following:  Reason for the appointment, diagnosis and treatment plan: Yes  Knowledge of proper use of medications and conditions for which they are ordered (with special attention to potential side effects or drug interactions): Yes  Which situations necessitate calling provider and whom to contact: Yes       Teaching Concerns Addressed:   Comments: none     Proper use and care of (medical equip, care aids, etc.): Yes  Nutritional needs and diet plan: Yes  Pain management techniques: Yes  Wound Care: Yes  How and/when to access community resources: Yes     Instructional Materials Used/Given: Preoperative teaching packet,  surgical soap x2, dental card, total joint booklet, welcome letter, stoplight tool.       Time spent with patient: 30 minutes.

## 2024-05-02 ENCOUNTER — TELEPHONE (OUTPATIENT)
Dept: ORTHOPEDICS | Facility: CLINIC | Age: 59
End: 2024-05-02

## 2024-05-02 NOTE — TELEPHONE ENCOUNTER
Left voicemail for patient to return call to discuss request.     Kristin Pemberton MSA, ATC  Certified Athletic Trainer

## 2024-05-02 NOTE — TELEPHONE ENCOUNTER
Reason for Call:  Form, our goal is to have forms completed with 7 days, however, some forms may require a visit or additional information.    Type of letter, form or note:  FMLA for daughter    Who is the form from?:  Joe    Where did the form come from: form was faxed in    Phone number of person requesting form: Joe, phone #: [phone]  Can we leave a detailed message on this number:  NO    How will form be returned?:  fax to Joe at fax#: [phone]    Has the patient signed a consent form for release of information (may be included with form)? NO    Additional comments: *Daughter will need to sign JAZZ form attached to fax received before office can fax/ communicate with Joe    Form was started and place in accordion folder labeled forms for provider review/ completion at [facility].

## 2024-05-07 ENCOUNTER — OFFICE VISIT (OUTPATIENT)
Dept: INTERNAL MEDICINE | Facility: CLINIC | Age: 59
End: 2024-05-07
Payer: COMMERCIAL

## 2024-05-07 VITALS
DIASTOLIC BLOOD PRESSURE: 90 MMHG | HEART RATE: 104 BPM | RESPIRATION RATE: 18 BRPM | TEMPERATURE: 96.5 F | WEIGHT: 202 LBS | HEIGHT: 64 IN | OXYGEN SATURATION: 95 % | BODY MASS INDEX: 34.49 KG/M2 | SYSTOLIC BLOOD PRESSURE: 140 MMHG

## 2024-05-07 DIAGNOSIS — G89.29 CHRONIC PAIN OF RIGHT KNEE: ICD-10-CM

## 2024-05-07 DIAGNOSIS — M17.11 PRIMARY OSTEOARTHRITIS OF RIGHT KNEE: ICD-10-CM

## 2024-05-07 DIAGNOSIS — M25.561 CHRONIC PAIN OF RIGHT KNEE: ICD-10-CM

## 2024-05-07 DIAGNOSIS — Z01.818 PREOP GENERAL PHYSICAL EXAM: Primary | ICD-10-CM

## 2024-05-07 DIAGNOSIS — D47.2 MGUS (MONOCLONAL GAMMOPATHY OF UNKNOWN SIGNIFICANCE): ICD-10-CM

## 2024-05-07 DIAGNOSIS — I10 ESSENTIAL HYPERTENSION, BENIGN: ICD-10-CM

## 2024-05-07 LAB
ANION GAP SERPL CALCULATED.3IONS-SCNC: 7 MMOL/L (ref 3–14)
BASOPHILS # BLD AUTO: 0 10E3/UL (ref 0–0.2)
BASOPHILS NFR BLD AUTO: 0 %
BUN SERPL-MCNC: 12 MG/DL (ref 7–30)
CALCIUM SERPL-MCNC: 9.1 MG/DL (ref 8.5–10.1)
CHLORIDE BLD-SCNC: 106 MMOL/L (ref 94–109)
CO2 SERPL-SCNC: 31 MMOL/L (ref 20–32)
CREAT SERPL-MCNC: 0.9 MG/DL (ref 0.52–1.04)
EGFRCR SERPLBLD CKD-EPI 2021: 74 ML/MIN/1.73M2
EOSINOPHIL # BLD AUTO: 0.1 10E3/UL (ref 0–0.7)
EOSINOPHIL NFR BLD AUTO: 1 %
ERYTHROCYTE [DISTWIDTH] IN BLOOD BY AUTOMATED COUNT: 12.8 % (ref 10–15)
GLUCOSE BLD-MCNC: 119 MG/DL (ref 70–99)
HCT VFR BLD AUTO: 41.5 % (ref 35–47)
HGB BLD-MCNC: 13.2 G/DL (ref 11.7–15.7)
IMM GRANULOCYTES # BLD: 0 10E3/UL
IMM GRANULOCYTES NFR BLD: 0 %
LYMPHOCYTES # BLD AUTO: 2.1 10E3/UL (ref 0.8–5.3)
LYMPHOCYTES NFR BLD AUTO: 26 %
MCH RBC QN AUTO: 27.2 PG (ref 26.5–33)
MCHC RBC AUTO-ENTMCNC: 31.8 G/DL (ref 31.5–36.5)
MCV RBC AUTO: 85 FL (ref 78–100)
MONOCYTES # BLD AUTO: 0.5 10E3/UL (ref 0–1.3)
MONOCYTES NFR BLD AUTO: 6 %
NEUTROPHILS # BLD AUTO: 5.4 10E3/UL (ref 1.6–8.3)
NEUTROPHILS NFR BLD AUTO: 67 %
PLATELET # BLD AUTO: 346 10E3/UL (ref 150–450)
POTASSIUM BLD-SCNC: 4.4 MMOL/L (ref 3.4–5.3)
RBC # BLD AUTO: 4.86 10E6/UL (ref 3.8–5.2)
SODIUM SERPL-SCNC: 144 MMOL/L (ref 135–145)
WBC # BLD AUTO: 8 10E3/UL (ref 4–11)

## 2024-05-07 PROCEDURE — 36415 COLL VENOUS BLD VENIPUNCTURE: CPT | Performed by: PHYSICIAN ASSISTANT

## 2024-05-07 PROCEDURE — 80048 BASIC METABOLIC PNL TOTAL CA: CPT | Performed by: PHYSICIAN ASSISTANT

## 2024-05-07 PROCEDURE — 85025 COMPLETE CBC W/AUTO DIFF WBC: CPT | Performed by: PHYSICIAN ASSISTANT

## 2024-05-07 PROCEDURE — 99214 OFFICE O/P EST MOD 30 MIN: CPT | Performed by: PHYSICIAN ASSISTANT

## 2024-05-07 NOTE — PROGRESS NOTES
Preoperative Evaluation  [address] [address]  Phone: [phone]  Primary Provider: Nick Calderon  Pre-op Performing Provider: ROSENDA BE  May 7, 2024       Jimmy is a 58 year old, presenting for the following:  Pre-Op Exam      Surgical Information  Surgery/Procedure: ARTHROPLASTY, KNEE, TOTAL   Surgery Location: [facility]  Surgeon: Dr Rosales  Surgery Date: 5/21/24  Time of Surgery: 9:00 AM  Where patient plans to recover: Other: TBD  Fax number for surgical facility: Note does not need to be faxed, will be available electronically in Epic.    Assessment & Plan     The proposed surgical procedure is considered INTERMEDIATE risk.    Preop general physical exam    - CBC with platelets and differential; Future  - Basic metabolic panel  (Ca, Cl, CO2, Creat, Gluc, K, Na, BUN); Future    Chronic pain of right knee    - CBC with platelets and differential; Future  - Basic metabolic panel  (Ca, Cl, CO2, Creat, Gluc, K, Na, BUN); Future    Primary osteoarthritis of right knee    - CBC with platelets and differential; Future  - Basic metabolic panel  (Ca, Cl, CO2, Creat, Gluc, K, Na, BUN); Future    Essential hypertension, benign    - CBC with platelets and differential; Future  - Basic metabolic panel  (Ca, Cl, CO2, Creat, Gluc, K, Na, BUN); Future    MGUS (monoclonal gammopathy of unknown significance)    - CBC with platelets and differential; Future  - Basic metabolic panel  (Ca, Cl, CO2, Creat, Gluc, K, Na, BUN); Future            - No identified additional risk factors other than previously addressed    Antiplatelet or Anticoagulation Medication Instructions   - Patient is on no antiplatelet or anticoagulation medications.    Additional Medication Instructions  Patient is to take all scheduled medications on the day of surgery EXCEPT for modifications listed below:   - ACE/ARB: HOLD on day of surgery (minimum 11 hours  for general anesthesia).   - Calcium Channel Blockers: May be continued on the day of surgery.   - pregabalin, gabapentin: Continue without modification.    Recommendation  APPROVAL GIVEN to proceed with proposed procedure, without further diagnostic evaluation.          Subjective       HPI related to upcoming procedure: right knee arthritis        5/7/2024     2:16 AM   Preop Questions   1. Have you ever had a heart attack or stroke? No   2. Have you ever had surgery on your heart or blood vessels, such as a stent placement, a coronary artery bypass, or surgery on an artery in your head, neck, heart, or legs? No   3. Do you have chest pain with activity? No   4. Do you have a history of  heart failure? No   5. Do you currently have a cold, bronchitis or symptoms of other infection? No   6. Do you have a cough, shortness of breath, or wheezing? No   7. Do you or anyone in your family have previous history of blood clots? No   8. Do you or does anyone in your family have a serious bleeding problem such as prolonged bleeding following surgeries or cuts? YES -   Daughter - with heavy bleeding- cancer dx    9. Have you ever had problems with anemia or been told to take iron pills? No   10. Have you had any abnormal blood loss such as black, tarry or bloody stools, or abnormal vaginal bleeding? No   11. Have you ever had a blood transfusion? No   12. Are you willing to have a blood transfusion if it is medically needed before, during, or after your surgery? Yes   13. Have you or any of your relatives ever had problems with anesthesia? No   14. Do you have sleep apnea, excessive snoring or daytime drowsiness? No   15. Do you have any artifical heart valves or other implanted medical devices like a pacemaker, defibrillator, or continuous glucose monitor? No   16. Do you have artificial joints? No   17. Are you allergic to latex? No   18. Is there any chance that you may be pregnant? No       Health Care Directive  Patient  does not have a Health Care Directive or Living Will:     Preoperative Review of    reviewed - controlled substances reflected in medication list.      Status of Chronic Conditions:  See problem list for active medical problems.  Problems all longstanding and stable, except as noted/documented.  See ROS for pertinent symptoms related to these conditions.    HYPERTENSION - Patient has longstanding history of HTN , currently denies any symptoms referable to elevated blood pressure. Specifically denies chest pain, palpitations, dyspnea, orthopnea, PND or peripheral edema. Blood pressure readings normal range at home. Current medication regimen is as listed below. Patient denies any side effects of medication.     Patient Active Problem List    Diagnosis Date Noted    MGUS (monoclonal gammopathy of unknown significance) 03/28/2023     Priority: Medium    Neuropathy 03/08/2022     Priority: Medium    Obesity (BMI 35.0-39.9) with comorbidity (H) 04/11/2019     Priority: Medium    Chronic bilateral low back pain without sciatica 04/11/2019     Priority: Medium    Essential hypertension, benign 03/15/2016     Priority: Medium    HYPERLIPIDEMIA LDL GOAL <160 10/31/2010     Priority: Medium    Acute stress reaction 09/09/2009     Priority: Medium    Knee pain 09/09/2009     Priority: Medium    iamCERVICALGIA 07/25/2005     Priority: Medium    iamTENSION HEADACHE 07/25/2005     Priority: Medium    iamPAIN IN LIMB 07/25/2005     Priority: Medium      Past Medical History:   Diagnosis Date    Acute stress reaction 09/09/2009    Arthritis     Essential hypertension, benign 03/15/2016    History of blood transfusion 1989    Hyperlipidemia LDL goal <160 10/31/2010    Iron deficiency anemia, unspecified     Knee pain 09/09/2009     Past Surgical History:   Procedure Laterality Date    [facility] REMOVAL OF OVARY/TUBE(S)  3/99    Salpingo-Oophorectomy, Unilateral    HYSTERECTOMY, PAP NO LONGER INDICATED  3/99    ZZC NONSPECIFIC  "PROCEDURE  10/29/99    MRI brain normal    [facility] NONSPECIFIC PROCEDURE  02/00    B breast reduction surgery    [facility] TOTAL ABDOM HYSTERECTOMY  3/99    Hysterectomy, Total Abdominal     Current Outpatient Medications   Medication Sig Dispense Refill    acetaminophen (TYLENOL) 500 MG tablet Take 1,000 mg by mouth every 8 hours as needed for mild pain      amLODIPine (NORVASC) 5 MG tablet Take 1 tablet (5 mg) by mouth daily 90 tablet 1    gabapentin (NEURONTIN) 300 MG capsule Take 600 mg by mouth 3 times daily      ibuprofen (ADVIL/MOTRIN) 200 MG tablet Take 400 mg by mouth every 6 hours as needed for mild pain      lisinopril (ZESTRIL) 20 MG tablet TAKE 1 TABLET BY MOUTH TWICE A  tablet 1    methocarbamol (ROBAXIN) 750 MG tablet Take 1 tablet (750 mg) by mouth every 8 hours as needed for muscle spasms 15 tablet 0    Multiple Vitamin (MULTIVITAMIN ADULT PO) Take 1 tablet by mouth daily      Turmeric POWD daily         Allergies   Allergen Reactions    H2 Antagonists      Mylan (itching)    Minocycline Hives        Social History     Tobacco Use    Smoking status: Never    Smokeless tobacco: Never   Substance Use Topics    Alcohol use: Never       History   Drug Use Unknown         Review of Systems    Review of Systems  Constitutional, HEENT, cardiovascular, pulmonary, gi and gu systems are negative, except as otherwise noted.    Objective    BP (!) 140/90   Pulse 104   Temp (!) 96.5  F (35.8  C) (Temporal)   Resp 18   Ht 1.626 m (5' 4\")   Wt 91.6 kg (202 lb)   LMP  (LMP Unknown)   SpO2 95%   BMI 34.67 kg/m     Estimated body mass index is 34.67 kg/m  as calculated from the following:    Height as of this encounter: 1.626 m (5' 4\").    Weight as of this encounter: 91.6 kg (202 lb).  Physical Exam  GENERAL: alert and no distress  HENT: ear canals and TM's normal, nose and mouth without ulcers or lesions  NECK: no adenopathy, no asymmetry, masses, or scars  RESP: lungs clear to auscultation - no rales, rhonchi " or wheezes  CV: regular rates and rhythm  MS: no gross musculoskeletal defects noted, no edema  SKIN: no suspicious lesions or rashes    Recent Labs   Lab Test 12/12/23  1734 06/15/23  1336   HGB 12.8 13.0    338    140   POTASSIUM 4.8 4.8   CR 0.96* 0.77        Diagnostics  Labs pending at this time.  Results will be reviewed when available.   No EKG required, no history of coronary heart disease, significant arrhythmia, peripheral arterial disease or other structural heart disease.    Revised Cardiac Risk Index (RCRI)  The patient has the following serious cardiovascular risks for perioperative complications:   - No serious cardiac risks = 0 points     RCRI Interpretation: 0 points: Class I (very low risk - 0.4% complication rate)         Signed Electronically by: Jazmín Vinson PA-C  Copy of this evaluation report is provided to requesting physician.

## 2024-05-07 NOTE — PATIENT INSTRUCTIONS
Do not take lisinopril the morning of surgery       Preparing for Your Surgery  Getting started  A nurse will call you to review your health history and instructions. They will give you an arrival time based on your scheduled surgery time. Please be ready to share:  Your doctor's clinic name and phone number  Your medical, surgical, and anesthesia history  A list of allergies and sensitivities  A list of medicines, including herbal treatments and over-the-counter drugs  Whether the patient has a legal guardian (ask how to send us the papers in advance)  Please tell us if you're pregnant--or if there's any chance you might be pregnant. Some surgeries may injure a fetus (unborn baby), so they require a pregnancy test. Surgeries that are safe for a fetus don't always need a test, and you can choose whether to have one.   If you have a child who's having surgery, please ask for a copy of Preparing for Your Child's Surgery.    Preparing for surgery  Within 10 to 30 days of surgery: Have a pre-op exam (sometimes called an H&P, or History and Physical). This can be done at a clinic or pre-operative center.  If you're having a , you may not need this exam. Talk to your care team.  At your pre-op exam, talk to your care team about all medicines you take. If you need to stop any medicines before surgery, ask when to start taking them again.  We do this for your safety. Many medicines can make you bleed too much during surgery. Some change how well surgery (anesthesia) drugs work.  Call your insurance company to let them know you're having surgery. (If you don't have insurance, call [phone].)  Call your clinic if there's any change in your health. This includes signs of a cold or flu (sore throat, runny nose, cough, rash, fever). It also includes a scrape or scratch near the surgery site.  If you have questions on the day of surgery, call your hospital or surgery center.  Eating and drinking guidelines  For your  safety: Unless your surgeon tells you otherwise, follow the guidelines below.  Eat and drink as usual until 8 hours before you arrive for surgery. After that, no food or milk.  Drink clear liquids until 2 hours before you arrive. These are liquids you can see through, like water, Gatorade, and Propel Water. They also include plain black coffee and tea (no cream or milk), candy, and breath mints. You can spit out gum when you arrive.  If you drink alcohol: Stop drinking it the night before surgery.  If your care team tells you to take medicine on the morning of surgery, it's okay to take it with a sip of water.  Preventing infection  Shower or bathe the night before and morning of your surgery. Follow the instructions your clinic gave you. (If no instructions, use regular soap.)  Don't shave or clip hair near your surgery site. We'll remove the hair if needed.  Don't smoke or vape the morning of surgery. You may chew nicotine gum up to 2 hours before surgery. A nicotine patch is okay.  Note: Some surgeries require you to completely quit smoking and nicotine. Check with your surgeon.  Your care team will make every effort to keep you safe from infection. We will:  Clean our hands often with soap and water (or an alcohol-based hand rub).  Clean the skin at your surgery site with a special soap that kills germs.  Give you a special gown to keep you warm. (Cold raises the risk of infection.)  Wear special hair covers, masks, gowns and gloves during surgery.  Give antibiotic medicine, if prescribed. Not all surgeries need antibiotics.  What to bring on the day of surgery  Photo ID and insurance card  Copy of your health care directive, if you have one  Glasses and hearing aids (bring cases)  You can't wear contacts during surgery  Inhaler and eye drops, if you use them (tell us about these when you arrive)  CPAP machine or breathing device, if you use them  A few personal items, if spending the night  If you have . . .  A  pacemaker, ICD (cardiac defibrillator) or other implant: Bring the ID card.  An implanted stimulator: Bring the remote control.  A legal guardian: Bring a copy of the certified (court-stamped) guardianship papers.  Please remove any jewelry, including body piercings. Leave jewelry and other valuables at home.  If you're going home the day of surgery  You must have a responsible adult drive you home. They should stay with you overnight as well.  If you don't have someone to stay with you, and you aren't safe to go home alone, we may keep you overnight. Insurance often won't pay for this.  After surgery  If it's hard to control your pain or you need more pain medicine, please call your surgeon's office.  Questions?   If you have any questions for your care team, list them here: _________________________________________________________________________________________________________________________________________________________________________ ____________________________________ ____________________________________ ____________________________________  For informational purposes only. Not to replace the advice of your health care provider. Copyright   2003, 2019 [employer]. All rights reserved. Clinically reviewed by Delmy Gomez MD. [employer] 232092 - REV 12/22.    How to Take Your Medication Before Surgery  - Take all of your medications before surgery except as noted below  - do  not take NSAID - ibuprofen or aleve one week prior to surgery

## 2024-05-14 NOTE — PROGRESS NOTES
Ortho Navigator Note      Pre-op Date 5/7/24      Medical Clearance  Cleared     Labs Stable      COVID Test Date No longer indicated      Skin  Intact      Activity: Ambulates independently with straight cane      Equipment Need Patient will likely need a walker for discharge. Defer to PT/OT for recs.       Meds to Hold Held all supplements 14 days prior to surgery  Ibuprofen-Hold 48 hr prior to surgery   Lisinopril- Hold am of surgery     * Medication recommendations are not intended to be exhaustive; they are limited to common medications that are potentially dangerous if incorrectly managed   NPO Instructions  Instructed to stop solids 8 hr prior to arrival and clears 2 hr prior to arrival.       Pre-op Joint Education Complete? Complete    Discharge Plan Patient has plan to discharge home on morning of POD 1.   Daughter will arrive at hospital at 0800 to participate in therapy and discharge education. They will then transport patient home    /Transportation Daughter will be  and transportation.  is physically able to care for patient.      Barriers to Discharge No barriers to discharge.      Additional Info/   Special Needs : Patient had no unanswered questions or concerns.           04/23/24 1405   Discharge Planning   Patient/Family Anticipates Transition to home with family   Concerns to be Addressed all concerns addressed in this encounter;no discharge needs identified   Living Arrangements   People in Home child(lazara), adult   Type of Residence Private Residence   Is your private residence a single family home or apartment? Apartment   Number of Stairs, Within Home, Primary greater than 10 stairs  (lives on 3rd floor, no elevator)   Stair Railings, Within Home, Primary railings safe and in good condition   Once home, are you able to live on one level? Yes   Which level? Main Level   Bathroom Shower/Tub Tub/Shower unit   Equipment Currently Used at Home cane, straight;walker, standard    Support System   Support Systems Family Members;Children   Do you have someone available to stay with you one or two nights once you are home? Yes   Medical Clearance   Date of Physical 05/06/24   [facility]   It is recommended that you call and check with any specialty providers before surgery to see if you need surgical clearance.  Do you see any specialty providers outside of your primary care provider? No   Blood   Known Bleeding Disorder or Coagulopathy No   Does the patient have any Restoration/cultural preferences related to blood products? No   Education   Has the patient scheduled or completed pre-op total joint education, either in class or online, in the last 12 months? Yes   What day did the patient complete, or plan to complete, pre-op total joint education? 05/01/24   Patient attended total joint pre-op class/received pre-op teaching  online   Relationship/Living Environment   Name(s) of People in Home Alek/brent

## 2024-05-20 NOTE — PHARMACY-ADMISSION MEDICATION HISTORY
Medication reconciliation interview completed by pre-admitting nurse, reviewed by pharmacy.     No further clarifications needed.     Prior to Admission medications    Medication Sig Last Dose Taking? Auth Provider Long Term End Date   acetaminophen (TYLENOL) 500 MG tablet Take 1,000 mg by mouth every 8 hours as needed for mild pain  Yes Reported, Patient     amLODIPine (NORVASC) 5 MG tablet Take 1 tablet (5 mg) by mouth daily  Yes Nick Calderon MD Yes    gabapentin (NEURONTIN) 300 MG capsule Take 600 mg by mouth 3 times daily  Yes Reported, Patient No    ibuprofen (ADVIL/MOTRIN) 200 MG tablet Take 400 mg by mouth every 6 hours as needed for mild pain  Yes Reported, Patient     lisinopril (ZESTRIL) 20 MG tablet TAKE 1 TABLET BY MOUTH TWICE A DAY  Yes Nick Calderon MD Yes    methocarbamol (ROBAXIN) 750 MG tablet Take 1 tablet (750 mg) by mouth every 8 hours as needed for muscle spasms  Yes Bryanna Pleitez, DO No    Multiple Vitamin (MULTIVITAMIN ADULT PO) Take 1 tablet by mouth daily  Yes Reported, Patient     Turmeric POWD daily  Yes Reported, Patient

## 2024-05-21 ENCOUNTER — APPOINTMENT (OUTPATIENT)
Dept: PHYSICAL THERAPY | Facility: CLINIC | Age: 59
End: 2024-05-21
Attending: STUDENT IN AN ORGANIZED HEALTH CARE EDUCATION/TRAINING PROGRAM
Payer: COMMERCIAL

## 2024-05-21 ENCOUNTER — APPOINTMENT (OUTPATIENT)
Dept: GENERAL RADIOLOGY | Facility: CLINIC | Age: 59
End: 2024-05-21
Attending: INTERNAL MEDICINE
Payer: COMMERCIAL

## 2024-05-21 ENCOUNTER — ANESTHESIA EVENT (OUTPATIENT)
Dept: SURGERY | Facility: CLINIC | Age: 59
End: 2024-05-21
Payer: COMMERCIAL

## 2024-05-21 ENCOUNTER — HOSPITAL ENCOUNTER (OUTPATIENT)
Facility: CLINIC | Age: 59
Discharge: HOME | End: 2024-05-22
Attending: STUDENT IN AN ORGANIZED HEALTH CARE EDUCATION/TRAINING PROGRAM | Admitting: STUDENT IN AN ORGANIZED HEALTH CARE EDUCATION/TRAINING PROGRAM
Payer: COMMERCIAL

## 2024-05-21 ENCOUNTER — ANESTHESIA (OUTPATIENT)
Dept: SURGERY | Facility: CLINIC | Age: 59
End: 2024-05-21
Payer: COMMERCIAL

## 2024-05-21 ENCOUNTER — APPOINTMENT (OUTPATIENT)
Dept: GENERAL RADIOLOGY | Facility: CLINIC | Age: 59
End: 2024-05-21
Attending: STUDENT IN AN ORGANIZED HEALTH CARE EDUCATION/TRAINING PROGRAM
Payer: COMMERCIAL

## 2024-05-21 DIAGNOSIS — Z96.651 S/P TOTAL KNEE ARTHROPLASTY, RIGHT: ICD-10-CM

## 2024-05-21 DIAGNOSIS — I10 ESSENTIAL HYPERTENSION, BENIGN: ICD-10-CM

## 2024-05-21 DIAGNOSIS — M17.11 OSTEOARTHROSIS, LOCALIZED, PRIMARY, KNEE, RIGHT: Primary | ICD-10-CM

## 2024-05-21 LAB
ALBUMIN UR-MCNC: NEGATIVE MG/DL
ANION GAP SERPL CALCULATED.3IONS-SCNC: 15 MMOL/L (ref 7–15)
APPEARANCE UR: CLEAR
BASOPHILS # BLD AUTO: 0 10E3/UL (ref 0–0.2)
BASOPHILS NFR BLD AUTO: 0 %
BILIRUB UR QL STRIP: NEGATIVE
BUN SERPL-MCNC: 16.5 MG/DL (ref 6–20)
CALCIUM SERPL-MCNC: 9 MG/DL (ref 8.6–10)
CHLORIDE SERPL-SCNC: 101 MMOL/L (ref 98–107)
COLOR UR AUTO: ABNORMAL
CREAT SERPL-MCNC: 0.89 MG/DL (ref 0.51–0.95)
DEPRECATED HCO3 PLAS-SCNC: 23 MMOL/L (ref 22–29)
EGFRCR SERPLBLD CKD-EPI 2021: 75 ML/MIN/1.73M2
EOSINOPHIL # BLD AUTO: 0 10E3/UL (ref 0–0.7)
EOSINOPHIL NFR BLD AUTO: 0 %
ERYTHROCYTE [DISTWIDTH] IN BLOOD BY AUTOMATED COUNT: 13.3 % (ref 10–15)
GLUCOSE SERPL-MCNC: 202 MG/DL (ref 70–99)
GLUCOSE UR STRIP-MCNC: NEGATIVE MG/DL
HCT VFR BLD AUTO: 37.1 % (ref 35–47)
HGB BLD-MCNC: 11.5 G/DL (ref 11.7–15.7)
HGB UR QL STRIP: NEGATIVE
IMM GRANULOCYTES # BLD: 0.1 10E3/UL
IMM GRANULOCYTES NFR BLD: 0 %
KETONES UR STRIP-MCNC: NEGATIVE MG/DL
LACTATE SERPL-SCNC: 3 MMOL/L (ref 0.7–2)
LACTATE SERPL-SCNC: 5.5 MMOL/L (ref 0.7–2)
LACTATE SERPL-SCNC: 5.5 MMOL/L (ref 0.7–2)
LEUKOCYTE ESTERASE UR QL STRIP: NEGATIVE
LYMPHOCYTES # BLD AUTO: 0.8 10E3/UL (ref 0.8–5.3)
LYMPHOCYTES NFR BLD AUTO: 5 %
MCH RBC QN AUTO: 27.6 PG (ref 26.5–33)
MCHC RBC AUTO-ENTMCNC: 31 G/DL (ref 31.5–36.5)
MCV RBC AUTO: 89 FL (ref 78–100)
MONOCYTES # BLD AUTO: 0.5 10E3/UL (ref 0–1.3)
MONOCYTES NFR BLD AUTO: 3 %
MUCOUS THREADS #/AREA URNS LPF: PRESENT /LPF
NEUTROPHILS # BLD AUTO: 13.9 10E3/UL (ref 1.6–8.3)
NEUTROPHILS NFR BLD AUTO: 92 %
NITRATE UR QL: NEGATIVE
NRBC # BLD AUTO: 0 10E3/UL
NRBC BLD AUTO-RTO: 0 /100
PH UR STRIP: 6 [PH] (ref 5–7)
PLATELET # BLD AUTO: 277 10E3/UL (ref 150–450)
POTASSIUM SERPL-SCNC: 4.9 MMOL/L (ref 3.4–5.3)
PROCALCITONIN SERPL IA-MCNC: 0.06 NG/ML
RBC # BLD AUTO: 4.17 10E6/UL (ref 3.8–5.2)
RBC URINE: <1 /HPF
SODIUM SERPL-SCNC: 139 MMOL/L (ref 135–145)
SP GR UR STRIP: 1.01 (ref 1–1.03)
SQUAMOUS EPITHELIAL: <1 /HPF
UROBILINOGEN UR STRIP-MCNC: NORMAL MG/DL
WBC # BLD AUTO: 15.3 10E3/UL (ref 4–11)
WBC URINE: <1 /HPF

## 2024-05-21 PROCEDURE — 258N000003 HC RX IP 258 OP 636: Performed by: ANESTHESIOLOGY

## 2024-05-21 PROCEDURE — 370N000017 HC ANESTHESIA TECHNICAL FEE, PER MIN: Performed by: STUDENT IN AN ORGANIZED HEALTH CARE EDUCATION/TRAINING PROGRAM

## 2024-05-21 PROCEDURE — 250N000011 HC RX IP 250 OP 636: Performed by: NURSE ANESTHETIST, CERTIFIED REGISTERED

## 2024-05-21 PROCEDURE — 99222 1ST HOSP IP/OBS MODERATE 55: CPT | Performed by: INTERNAL MEDICINE

## 2024-05-21 PROCEDURE — 85025 COMPLETE CBC W/AUTO DIFF WBC: CPT | Performed by: INTERNAL MEDICINE

## 2024-05-21 PROCEDURE — 258N000003 HC RX IP 258 OP 636: Performed by: INTERNAL MEDICINE

## 2024-05-21 PROCEDURE — 83605 ASSAY OF LACTIC ACID: CPT | Performed by: STUDENT IN AN ORGANIZED HEALTH CARE EDUCATION/TRAINING PROGRAM

## 2024-05-21 PROCEDURE — C1713 ANCHOR/SCREW BN/BN,TIS/BN: HCPCS | Performed by: STUDENT IN AN ORGANIZED HEALTH CARE EDUCATION/TRAINING PROGRAM

## 2024-05-21 PROCEDURE — 999N000141 HC STATISTIC PRE-PROCEDURE NURSING ASSESSMENT: Performed by: STUDENT IN AN ORGANIZED HEALTH CARE EDUCATION/TRAINING PROGRAM

## 2024-05-21 PROCEDURE — 27447 TOTAL KNEE ARTHROPLASTY: CPT | Mod: RT | Performed by: STUDENT IN AN ORGANIZED HEALTH CARE EDUCATION/TRAINING PROGRAM

## 2024-05-21 PROCEDURE — 80048 BASIC METABOLIC PNL TOTAL CA: CPT | Performed by: INTERNAL MEDICINE

## 2024-05-21 PROCEDURE — 360N000077 HC SURGERY LEVEL 4, PER MIN: Performed by: STUDENT IN AN ORGANIZED HEALTH CARE EDUCATION/TRAINING PROGRAM

## 2024-05-21 PROCEDURE — 81001 URINALYSIS AUTO W/SCOPE: CPT | Performed by: INTERNAL MEDICINE

## 2024-05-21 PROCEDURE — 36415 COLL VENOUS BLD VENIPUNCTURE: CPT | Performed by: INTERNAL MEDICINE

## 2024-05-21 PROCEDURE — 84145 PROCALCITONIN (PCT): CPT | Performed by: INTERNAL MEDICINE

## 2024-05-21 PROCEDURE — 83605 ASSAY OF LACTIC ACID: CPT | Mod: 91 | Performed by: INTERNAL MEDICINE

## 2024-05-21 PROCEDURE — 250N000025 HC SEVOFLURANE, PER MIN: Performed by: STUDENT IN AN ORGANIZED HEALTH CARE EDUCATION/TRAINING PROGRAM

## 2024-05-21 PROCEDURE — C1776 JOINT DEVICE (IMPLANTABLE): HCPCS | Performed by: STUDENT IN AN ORGANIZED HEALTH CARE EDUCATION/TRAINING PROGRAM

## 2024-05-21 PROCEDURE — 250N000009 HC RX 250: Performed by: STUDENT IN AN ORGANIZED HEALTH CARE EDUCATION/TRAINING PROGRAM

## 2024-05-21 PROCEDURE — 87040 BLOOD CULTURE FOR BACTERIA: CPT | Performed by: INTERNAL MEDICINE

## 2024-05-21 PROCEDURE — 710N000009 HC RECOVERY PHASE 1, LEVEL 1, PER MIN: Performed by: STUDENT IN AN ORGANIZED HEALTH CARE EDUCATION/TRAINING PROGRAM

## 2024-05-21 PROCEDURE — 258N000003 HC RX IP 258 OP 636: Performed by: STUDENT IN AN ORGANIZED HEALTH CARE EDUCATION/TRAINING PROGRAM

## 2024-05-21 PROCEDURE — 250N000013 HC RX MED GY IP 250 OP 250 PS 637: Performed by: STUDENT IN AN ORGANIZED HEALTH CARE EDUCATION/TRAINING PROGRAM

## 2024-05-21 PROCEDURE — 97116 GAIT TRAINING THERAPY: CPT | Mod: GP

## 2024-05-21 PROCEDURE — 250N000011 HC RX IP 250 OP 636: Performed by: STUDENT IN AN ORGANIZED HEALTH CARE EDUCATION/TRAINING PROGRAM

## 2024-05-21 PROCEDURE — 250N000009 HC RX 250: Performed by: NURSE ANESTHETIST, CERTIFIED REGISTERED

## 2024-05-21 PROCEDURE — 258N000001 HC RX 258: Performed by: STUDENT IN AN ORGANIZED HEALTH CARE EDUCATION/TRAINING PROGRAM

## 2024-05-21 PROCEDURE — 97530 THERAPEUTIC ACTIVITIES: CPT | Mod: GP

## 2024-05-21 PROCEDURE — 272N000001 HC OR GENERAL SUPPLY STERILE: Performed by: STUDENT IN AN ORGANIZED HEALTH CARE EDUCATION/TRAINING PROGRAM

## 2024-05-21 PROCEDURE — 250N000011 HC RX IP 250 OP 636: Performed by: ANESTHESIOLOGY

## 2024-05-21 PROCEDURE — 71045 X-RAY EXAM CHEST 1 VIEW: CPT

## 2024-05-21 PROCEDURE — 999N000065 XR KNEE PORT RIGHT 1/2 VIEWS: Mod: RT

## 2024-05-21 PROCEDURE — 97161 PT EVAL LOW COMPLEX 20 MIN: CPT | Mod: GP

## 2024-05-21 PROCEDURE — 258N000003 HC RX IP 258 OP 636: Performed by: NURSE ANESTHETIST, CERTIFIED REGISTERED

## 2024-05-21 PROCEDURE — 36415 COLL VENOUS BLD VENIPUNCTURE: CPT | Performed by: STUDENT IN AN ORGANIZED HEALTH CARE EDUCATION/TRAINING PROGRAM

## 2024-05-21 DEVICE — IMPLANTABLE DEVICE
Type: IMPLANTABLE DEVICE | Site: KNEE | Status: FUNCTIONAL
Brand: PERSONA®

## 2024-05-21 DEVICE — IMPLANTABLE DEVICE
Type: IMPLANTABLE DEVICE | Site: KNEE | Status: FUNCTIONAL
Brand: PERSONA® VIVACIT-E®

## 2024-05-21 DEVICE — IMPLANTABLE DEVICE
Type: IMPLANTABLE DEVICE | Site: KNEE | Status: FUNCTIONAL
Brand: PERSONA® NATURAL TIBIA®

## 2024-05-21 DEVICE — BONE CEMENT SIMPLEX FULL DOSE 6191-1-001: Type: IMPLANTABLE DEVICE | Site: KNEE | Status: FUNCTIONAL

## 2024-05-21 RX ORDER — CEFAZOLIN SODIUM 2 G/100ML
2 INJECTION, SOLUTION INTRAVENOUS EVERY 8 HOURS
Qty: 200 ML | Refills: 0 | Status: COMPLETED | OUTPATIENT
Start: 2024-05-21 | End: 2024-05-22

## 2024-05-21 RX ORDER — SODIUM CHLORIDE, SODIUM LACTATE, POTASSIUM CHLORIDE, CALCIUM CHLORIDE 600; 310; 30; 20 MG/100ML; MG/100ML; MG/100ML; MG/100ML
INJECTION, SOLUTION INTRAVENOUS CONTINUOUS
Status: DISCONTINUED | OUTPATIENT
Start: 2024-05-21 | End: 2024-05-21 | Stop reason: HOSPADM

## 2024-05-21 RX ORDER — NALOXONE HYDROCHLORIDE 0.4 MG/ML
0.1 INJECTION, SOLUTION INTRAMUSCULAR; INTRAVENOUS; SUBCUTANEOUS
Status: DISCONTINUED | OUTPATIENT
Start: 2024-05-21 | End: 2024-05-21 | Stop reason: HOSPADM

## 2024-05-21 RX ORDER — ALBUTEROL SULFATE 0.83 MG/ML
2.5 SOLUTION RESPIRATORY (INHALATION) EVERY 4 HOURS PRN
Status: DISCONTINUED | OUTPATIENT
Start: 2024-05-21 | End: 2024-05-21 | Stop reason: HOSPADM

## 2024-05-21 RX ORDER — ASPIRIN 81 MG/1
81 TABLET ORAL 2 TIMES DAILY
Qty: 60 TABLET | Refills: 0 | Status: SHIPPED | OUTPATIENT
Start: 2024-05-21

## 2024-05-21 RX ORDER — HYDROMORPHONE HCL IN WATER/PF 6 MG/30 ML
0.4 PATIENT CONTROLLED ANALGESIA SYRINGE INTRAVENOUS EVERY 5 MIN PRN
Status: DISCONTINUED | OUTPATIENT
Start: 2024-05-21 | End: 2024-05-21 | Stop reason: HOSPADM

## 2024-05-21 RX ORDER — ONDANSETRON 2 MG/ML
4 INJECTION INTRAMUSCULAR; INTRAVENOUS EVERY 6 HOURS PRN
Status: DISCONTINUED | OUTPATIENT
Start: 2024-05-21 | End: 2024-05-22 | Stop reason: HOSPADM

## 2024-05-21 RX ORDER — OXYCODONE HYDROCHLORIDE 5 MG/1
5 TABLET ORAL EVERY 4 HOURS PRN
Status: DISCONTINUED | OUTPATIENT
Start: 2024-05-21 | End: 2024-05-22 | Stop reason: HOSPADM

## 2024-05-21 RX ORDER — NALOXONE HYDROCHLORIDE 0.4 MG/ML
0.4 INJECTION, SOLUTION INTRAMUSCULAR; INTRAVENOUS; SUBCUTANEOUS
Status: DISCONTINUED | OUTPATIENT
Start: 2024-05-21 | End: 2024-05-22 | Stop reason: HOSPADM

## 2024-05-21 RX ORDER — GABAPENTIN 300 MG/1
600 CAPSULE ORAL 3 TIMES DAILY
Status: DISCONTINUED | OUTPATIENT
Start: 2024-05-21 | End: 2024-05-22 | Stop reason: HOSPADM

## 2024-05-21 RX ORDER — FENTANYL CITRATE 50 UG/ML
INJECTION, SOLUTION INTRAMUSCULAR; INTRAVENOUS PRN
Status: DISCONTINUED | OUTPATIENT
Start: 2024-05-21 | End: 2024-05-21

## 2024-05-21 RX ORDER — PROPOFOL 10 MG/ML
INJECTION, EMULSION INTRAVENOUS CONTINUOUS PRN
Status: DISCONTINUED | OUTPATIENT
Start: 2024-05-21 | End: 2024-05-21

## 2024-05-21 RX ORDER — HYDROMORPHONE HCL IN WATER/PF 6 MG/30 ML
0.2 PATIENT CONTROLLED ANALGESIA SYRINGE INTRAVENOUS EVERY 5 MIN PRN
Status: DISCONTINUED | OUTPATIENT
Start: 2024-05-21 | End: 2024-05-21 | Stop reason: HOSPADM

## 2024-05-21 RX ORDER — HYDROMORPHONE HCL IN WATER/PF 6 MG/30 ML
0.2 PATIENT CONTROLLED ANALGESIA SYRINGE INTRAVENOUS
Status: DISCONTINUED | OUTPATIENT
Start: 2024-05-21 | End: 2024-05-22 | Stop reason: HOSPADM

## 2024-05-21 RX ORDER — OXYCODONE HYDROCHLORIDE 5 MG/1
5-10 TABLET ORAL EVERY 4 HOURS PRN
Qty: 26 TABLET | Refills: 0 | Status: SHIPPED | OUTPATIENT
Start: 2024-05-21

## 2024-05-21 RX ORDER — NALOXONE HYDROCHLORIDE 0.4 MG/ML
0.2 INJECTION, SOLUTION INTRAMUSCULAR; INTRAVENOUS; SUBCUTANEOUS
Status: DISCONTINUED | OUTPATIENT
Start: 2024-05-21 | End: 2024-05-22 | Stop reason: HOSPADM

## 2024-05-21 RX ORDER — LIDOCAINE 40 MG/G
CREAM TOPICAL
Status: DISCONTINUED | OUTPATIENT
Start: 2024-05-21 | End: 2024-05-21 | Stop reason: HOSPADM

## 2024-05-21 RX ORDER — IBUPROFEN 600 MG/1
600 TABLET, FILM COATED ORAL EVERY 6 HOURS PRN
Qty: 30 TABLET | Refills: 0 | Status: SHIPPED | OUTPATIENT
Start: 2024-05-21

## 2024-05-21 RX ORDER — METOPROLOL TARTRATE 1 MG/ML
INJECTION, SOLUTION INTRAVENOUS PRN
Status: DISCONTINUED | OUTPATIENT
Start: 2024-05-21 | End: 2024-05-21

## 2024-05-21 RX ORDER — OXYCODONE HYDROCHLORIDE 10 MG/1
10 TABLET ORAL EVERY 4 HOURS PRN
Status: DISCONTINUED | OUTPATIENT
Start: 2024-05-21 | End: 2024-05-22 | Stop reason: HOSPADM

## 2024-05-21 RX ORDER — CEFAZOLIN SODIUM/WATER 2 G/20 ML
2 SYRINGE (ML) INTRAVENOUS SEE ADMIN INSTRUCTIONS
Status: DISCONTINUED | OUTPATIENT
Start: 2024-05-21 | End: 2024-05-21 | Stop reason: HOSPADM

## 2024-05-21 RX ORDER — ACETAMINOPHEN 325 MG/1
975 TABLET ORAL EVERY 8 HOURS
Status: DISCONTINUED | OUTPATIENT
Start: 2024-05-21 | End: 2024-05-22 | Stop reason: HOSPADM

## 2024-05-21 RX ORDER — POLYETHYLENE GLYCOL 3350 17 G/17G
1 POWDER, FOR SOLUTION ORAL DAILY
Qty: 7 PACKET | Refills: 0 | Status: SHIPPED | OUTPATIENT
Start: 2024-05-21

## 2024-05-21 RX ORDER — DIAZEPAM 10 MG/2ML
2.5 INJECTION, SOLUTION INTRAMUSCULAR; INTRAVENOUS
Status: DISCONTINUED | OUTPATIENT
Start: 2024-05-21 | End: 2024-05-21 | Stop reason: HOSPADM

## 2024-05-21 RX ORDER — ONDANSETRON 2 MG/ML
INJECTION INTRAMUSCULAR; INTRAVENOUS PRN
Status: DISCONTINUED | OUTPATIENT
Start: 2024-05-21 | End: 2024-05-21

## 2024-05-21 RX ORDER — PROPOFOL 10 MG/ML
INJECTION, EMULSION INTRAVENOUS PRN
Status: DISCONTINUED | OUTPATIENT
Start: 2024-05-21 | End: 2024-05-21

## 2024-05-21 RX ORDER — TRANEXAMIC ACID 650 MG/1
1950 TABLET ORAL ONCE
Status: COMPLETED | OUTPATIENT
Start: 2024-05-21 | End: 2024-05-21

## 2024-05-21 RX ORDER — GLYCOPYRROLATE 0.2 MG/ML
INJECTION, SOLUTION INTRAMUSCULAR; INTRAVENOUS PRN
Status: DISCONTINUED | OUTPATIENT
Start: 2024-05-21 | End: 2024-05-21

## 2024-05-21 RX ORDER — SODIUM CHLORIDE, SODIUM LACTATE, POTASSIUM CHLORIDE, CALCIUM CHLORIDE 600; 310; 30; 20 MG/100ML; MG/100ML; MG/100ML; MG/100ML
INJECTION, SOLUTION INTRAVENOUS CONTINUOUS
Status: DISCONTINUED | OUTPATIENT
Start: 2024-05-21 | End: 2024-05-22 | Stop reason: HOSPADM

## 2024-05-21 RX ORDER — DIPHENHYDRAMINE HYDROCHLORIDE 50 MG/ML
25 INJECTION INTRAMUSCULAR; INTRAVENOUS EVERY 6 HOURS PRN
Status: DISCONTINUED | OUTPATIENT
Start: 2024-05-21 | End: 2024-05-21 | Stop reason: HOSPADM

## 2024-05-21 RX ORDER — AMLODIPINE BESYLATE 5 MG/1
5 TABLET ORAL DAILY
Status: DISCONTINUED | OUTPATIENT
Start: 2024-05-22 | End: 2024-05-22 | Stop reason: HOSPADM

## 2024-05-21 RX ORDER — LIDOCAINE 40 MG/G
CREAM TOPICAL
Status: DISCONTINUED | OUTPATIENT
Start: 2024-05-21 | End: 2024-05-22 | Stop reason: HOSPADM

## 2024-05-21 RX ORDER — AMOXICILLIN 250 MG
1 CAPSULE ORAL 2 TIMES DAILY
Status: DISCONTINUED | OUTPATIENT
Start: 2024-05-21 | End: 2024-05-22 | Stop reason: HOSPADM

## 2024-05-21 RX ORDER — ONDANSETRON 2 MG/ML
4 INJECTION INTRAMUSCULAR; INTRAVENOUS EVERY 30 MIN PRN
Status: DISCONTINUED | OUTPATIENT
Start: 2024-05-21 | End: 2024-05-21 | Stop reason: HOSPADM

## 2024-05-21 RX ORDER — HYDRALAZINE HYDROCHLORIDE 20 MG/ML
2.5-5 INJECTION INTRAMUSCULAR; INTRAVENOUS EVERY 10 MIN PRN
Status: DISCONTINUED | OUTPATIENT
Start: 2024-05-21 | End: 2024-05-21 | Stop reason: HOSPADM

## 2024-05-21 RX ORDER — ACETAMINOPHEN 325 MG/1
650 TABLET ORAL EVERY 4 HOURS PRN
Status: DISCONTINUED | OUTPATIENT
Start: 2024-05-24 | End: 2024-05-22 | Stop reason: HOSPADM

## 2024-05-21 RX ORDER — POLYETHYLENE GLYCOL 3350 17 G/17G
17 POWDER, FOR SOLUTION ORAL DAILY
Status: DISCONTINUED | OUTPATIENT
Start: 2024-05-22 | End: 2024-05-22 | Stop reason: HOSPADM

## 2024-05-21 RX ORDER — BISACODYL 10 MG
10 SUPPOSITORY, RECTAL RECTAL DAILY PRN
Status: DISCONTINUED | OUTPATIENT
Start: 2024-05-21 | End: 2024-05-22 | Stop reason: HOSPADM

## 2024-05-21 RX ORDER — KETOROLAC TROMETHAMINE 30 MG/ML
INJECTION, SOLUTION INTRAMUSCULAR; INTRAVENOUS PRN
Status: DISCONTINUED | OUTPATIENT
Start: 2024-05-21 | End: 2024-05-21

## 2024-05-21 RX ORDER — HYDROMORPHONE HCL IN WATER/PF 6 MG/30 ML
0.4 PATIENT CONTROLLED ANALGESIA SYRINGE INTRAVENOUS
Status: DISCONTINUED | OUTPATIENT
Start: 2024-05-21 | End: 2024-05-22 | Stop reason: HOSPADM

## 2024-05-21 RX ORDER — LIDOCAINE HYDROCHLORIDE 20 MG/ML
INJECTION, SOLUTION INFILTRATION; PERINEURAL PRN
Status: DISCONTINUED | OUTPATIENT
Start: 2024-05-21 | End: 2024-05-21

## 2024-05-21 RX ORDER — DEXAMETHASONE SODIUM PHOSPHATE 4 MG/ML
4 INJECTION, SOLUTION INTRA-ARTICULAR; INTRALESIONAL; INTRAMUSCULAR; INTRAVENOUS; SOFT TISSUE
Status: DISCONTINUED | OUTPATIENT
Start: 2024-05-21 | End: 2024-05-21 | Stop reason: HOSPADM

## 2024-05-21 RX ORDER — DEXAMETHASONE SODIUM PHOSPHATE 4 MG/ML
INJECTION, SOLUTION INTRA-ARTICULAR; INTRALESIONAL; INTRAMUSCULAR; INTRAVENOUS; SOFT TISSUE PRN
Status: DISCONTINUED | OUTPATIENT
Start: 2024-05-21 | End: 2024-05-21

## 2024-05-21 RX ORDER — ONDANSETRON 4 MG/1
4 TABLET, ORALLY DISINTEGRATING ORAL EVERY 30 MIN PRN
Status: DISCONTINUED | OUTPATIENT
Start: 2024-05-21 | End: 2024-05-21 | Stop reason: HOSPADM

## 2024-05-21 RX ORDER — PROCHLORPERAZINE MALEATE 5 MG
10 TABLET ORAL EVERY 6 HOURS PRN
Status: DISCONTINUED | OUTPATIENT
Start: 2024-05-21 | End: 2024-05-22 | Stop reason: HOSPADM

## 2024-05-21 RX ORDER — METHOCARBAMOL 750 MG/1
750 TABLET, FILM COATED ORAL EVERY 8 HOURS PRN
Status: DISCONTINUED | OUTPATIENT
Start: 2024-05-21 | End: 2024-05-22 | Stop reason: HOSPADM

## 2024-05-21 RX ORDER — ACETAMINOPHEN 325 MG/1
975 TABLET ORAL ONCE
Status: DISCONTINUED | OUTPATIENT
Start: 2024-05-21 | End: 2024-05-21

## 2024-05-21 RX ORDER — ACETAMINOPHEN 325 MG/1
650 TABLET ORAL EVERY 4 HOURS PRN
Qty: 100 TABLET | Refills: 0 | Status: SHIPPED | OUTPATIENT
Start: 2024-05-21

## 2024-05-21 RX ORDER — LABETALOL HYDROCHLORIDE 5 MG/ML
10 INJECTION, SOLUTION INTRAVENOUS
Status: COMPLETED | OUTPATIENT
Start: 2024-05-21 | End: 2024-05-21

## 2024-05-21 RX ORDER — ONDANSETRON 4 MG/1
4 TABLET, ORALLY DISINTEGRATING ORAL EVERY 6 HOURS PRN
Status: DISCONTINUED | OUTPATIENT
Start: 2024-05-21 | End: 2024-05-22 | Stop reason: HOSPADM

## 2024-05-21 RX ORDER — AMOXICILLIN 250 MG
1-2 CAPSULE ORAL 2 TIMES DAILY
Qty: 30 TABLET | Refills: 0 | Status: SHIPPED | OUTPATIENT
Start: 2024-05-21

## 2024-05-21 RX ORDER — CEFAZOLIN SODIUM/WATER 2 G/20 ML
2 SYRINGE (ML) INTRAVENOUS
Status: COMPLETED | OUTPATIENT
Start: 2024-05-21 | End: 2024-05-21

## 2024-05-21 RX ORDER — KETOROLAC TROMETHAMINE 15 MG/ML
15 INJECTION, SOLUTION INTRAMUSCULAR; INTRAVENOUS EVERY 6 HOURS
Status: COMPLETED | OUTPATIENT
Start: 2024-05-21 | End: 2024-05-22

## 2024-05-21 RX ORDER — DIPHENHYDRAMINE HCL 25 MG
25 CAPSULE ORAL EVERY 6 HOURS PRN
Status: DISCONTINUED | OUTPATIENT
Start: 2024-05-21 | End: 2024-05-21 | Stop reason: HOSPADM

## 2024-05-21 RX ORDER — LISINOPRIL 20 MG/1
20 TABLET ORAL DAILY
Status: DISCONTINUED | OUTPATIENT
Start: 2024-05-21 | End: 2024-05-22 | Stop reason: HOSPADM

## 2024-05-21 RX ORDER — ASPIRIN 81 MG/1
81 TABLET ORAL 2 TIMES DAILY
Status: DISCONTINUED | OUTPATIENT
Start: 2024-05-21 | End: 2024-05-22 | Stop reason: HOSPADM

## 2024-05-21 RX ORDER — FENTANYL CITRATE 50 UG/ML
50 INJECTION, SOLUTION INTRAMUSCULAR; INTRAVENOUS EVERY 5 MIN PRN
Status: DISCONTINUED | OUTPATIENT
Start: 2024-05-21 | End: 2024-05-21 | Stop reason: HOSPADM

## 2024-05-21 RX ORDER — FENTANYL CITRATE 50 UG/ML
25 INJECTION, SOLUTION INTRAMUSCULAR; INTRAVENOUS EVERY 5 MIN PRN
Status: DISCONTINUED | OUTPATIENT
Start: 2024-05-21 | End: 2024-05-21 | Stop reason: HOSPADM

## 2024-05-21 RX ADMIN — LABETALOL HYDROCHLORIDE 10 MG: 5 INJECTION, SOLUTION INTRAVENOUS at 11:57

## 2024-05-21 RX ADMIN — SODIUM CHLORIDE, POTASSIUM CHLORIDE, SODIUM LACTATE AND CALCIUM CHLORIDE: 600; 310; 30; 20 INJECTION, SOLUTION INTRAVENOUS at 10:14

## 2024-05-21 RX ADMIN — PHENYLEPHRINE HYDROCHLORIDE 100 MCG: 10 INJECTION INTRAVENOUS at 09:22

## 2024-05-21 RX ADMIN — DIPHENHYDRAMINE HYDROCHLORIDE 25 MG: 50 INJECTION INTRAMUSCULAR; INTRAVENOUS at 11:27

## 2024-05-21 RX ADMIN — FENTANYL CITRATE 50 MCG: 50 INJECTION, SOLUTION INTRAMUSCULAR; INTRAVENOUS at 11:10

## 2024-05-21 RX ADMIN — ROCURONIUM BROMIDE 20 MG: 50 INJECTION, SOLUTION INTRAVENOUS at 09:12

## 2024-05-21 RX ADMIN — HYDROMORPHONE HYDROCHLORIDE 0.4 MG: 0.2 INJECTION, SOLUTION INTRAMUSCULAR; INTRAVENOUS; SUBCUTANEOUS at 11:30

## 2024-05-21 RX ADMIN — FENTANYL CITRATE 100 MCG: 50 INJECTION INTRAMUSCULAR; INTRAVENOUS at 09:45

## 2024-05-21 RX ADMIN — METOPROLOL TARTRATE 1 MG: 5 INJECTION INTRAVENOUS at 10:38

## 2024-05-21 RX ADMIN — SENNOSIDES AND DOCUSATE SODIUM 1 TABLET: 50; 8.6 TABLET ORAL at 20:34

## 2024-05-21 RX ADMIN — HYDROMORPHONE HYDROCHLORIDE 1 MG: 1 INJECTION, SOLUTION INTRAMUSCULAR; INTRAVENOUS; SUBCUTANEOUS at 09:12

## 2024-05-21 RX ADMIN — LISINOPRIL 20 MG: 20 TABLET ORAL at 17:47

## 2024-05-21 RX ADMIN — Medication 2 G: at 09:06

## 2024-05-21 RX ADMIN — FENTANYL CITRATE 100 MCG: 50 INJECTION INTRAMUSCULAR; INTRAVENOUS at 09:12

## 2024-05-21 RX ADMIN — HYDROMORPHONE HYDROCHLORIDE 0.4 MG: 0.2 INJECTION, SOLUTION INTRAMUSCULAR; INTRAVENOUS; SUBCUTANEOUS at 11:25

## 2024-05-21 RX ADMIN — PROPOFOL 50 MCG/KG/MIN: 10 INJECTION, EMULSION INTRAVENOUS at 09:12

## 2024-05-21 RX ADMIN — CEFAZOLIN SODIUM 2 G: 2 INJECTION, SOLUTION INTRAVENOUS at 17:35

## 2024-05-21 RX ADMIN — GABAPENTIN 600 MG: 300 CAPSULE ORAL at 20:33

## 2024-05-21 RX ADMIN — KETOROLAC TROMETHAMINE 15 MG: 15 INJECTION, SOLUTION INTRAMUSCULAR; INTRAVENOUS at 22:14

## 2024-05-21 RX ADMIN — GLYCOPYRROLATE 0.2 MG: 0.2 INJECTION, SOLUTION INTRAMUSCULAR; INTRAVENOUS at 09:12

## 2024-05-21 RX ADMIN — FENTANYL CITRATE 50 MCG: 50 INJECTION INTRAMUSCULAR; INTRAVENOUS at 10:00

## 2024-05-21 RX ADMIN — TRANEXAMIC ACID 1950 MG: 650 TABLET ORAL at 07:13

## 2024-05-21 RX ADMIN — ONDANSETRON 4 MG: 2 INJECTION INTRAMUSCULAR; INTRAVENOUS at 09:12

## 2024-05-21 RX ADMIN — ACETAMINOPHEN 975 MG: 325 TABLET, FILM COATED ORAL at 22:45

## 2024-05-21 RX ADMIN — SODIUM CHLORIDE, POTASSIUM CHLORIDE, SODIUM LACTATE AND CALCIUM CHLORIDE: 600; 310; 30; 20 INJECTION, SOLUTION INTRAVENOUS at 07:56

## 2024-05-21 RX ADMIN — MIDAZOLAM 2 MG: 1 INJECTION INTRAMUSCULAR; INTRAVENOUS at 09:08

## 2024-05-21 RX ADMIN — ASPIRIN 81 MG: 81 TABLET, COATED ORAL at 20:34

## 2024-05-21 RX ADMIN — PHENYLEPHRINE HYDROCHLORIDE 100 MCG: 10 INJECTION INTRAVENOUS at 09:29

## 2024-05-21 RX ADMIN — HYDROMORPHONE HYDROCHLORIDE 0.4 MG: 0.2 INJECTION, SOLUTION INTRAMUSCULAR; INTRAVENOUS; SUBCUTANEOUS at 11:15

## 2024-05-21 RX ADMIN — ACETAMINOPHEN 975 MG: 325 TABLET, FILM COATED ORAL at 16:17

## 2024-05-21 RX ADMIN — FENTANYL CITRATE 50 MCG: 50 INJECTION INTRAMUSCULAR; INTRAVENOUS at 10:11

## 2024-05-21 RX ADMIN — HYDROMORPHONE HYDROCHLORIDE 0.4 MG: 0.2 INJECTION, SOLUTION INTRAMUSCULAR; INTRAVENOUS; SUBCUTANEOUS at 11:20

## 2024-05-21 RX ADMIN — KETOROLAC TROMETHAMINE 15 MG: 15 INJECTION, SOLUTION INTRAMUSCULAR; INTRAVENOUS at 16:17

## 2024-05-21 RX ADMIN — DEXAMETHASONE SODIUM PHOSPHATE 8 MG: 4 INJECTION, SOLUTION INTRA-ARTICULAR; INTRALESIONAL; INTRAMUSCULAR; INTRAVENOUS; SOFT TISSUE at 09:12

## 2024-05-21 RX ADMIN — PROPOFOL 200 MG: 10 INJECTION, EMULSION INTRAVENOUS at 09:12

## 2024-05-21 RX ADMIN — KETOROLAC TROMETHAMINE 30 MG: 30 INJECTION, SOLUTION INTRAMUSCULAR at 09:12

## 2024-05-21 RX ADMIN — SODIUM CHLORIDE, POTASSIUM CHLORIDE, SODIUM LACTATE AND CALCIUM CHLORIDE: 600; 310; 30; 20 INJECTION, SOLUTION INTRAVENOUS at 22:41

## 2024-05-21 RX ADMIN — SODIUM CHLORIDE, POTASSIUM CHLORIDE, SODIUM LACTATE AND CALCIUM CHLORIDE 1000 ML: 600; 310; 30; 20 INJECTION, SOLUTION INTRAVENOUS at 20:19

## 2024-05-21 RX ADMIN — LIDOCAINE HYDROCHLORIDE 50 MG: 20 INJECTION, SOLUTION INFILTRATION; PERINEURAL at 09:12

## 2024-05-21 RX ADMIN — SUGAMMADEX 100 MG: 100 INJECTION, SOLUTION INTRAVENOUS at 10:48

## 2024-05-21 RX ADMIN — OXYCODONE HYDROCHLORIDE 5 MG: 5 TABLET ORAL at 20:34

## 2024-05-21 RX ADMIN — METOPROLOL TARTRATE 1 MG: 5 INJECTION INTRAVENOUS at 10:56

## 2024-05-21 ASSESSMENT — ACTIVITIES OF DAILY LIVING (ADL)
ADLS_ACUITY_SCORE: 27
ADLS_ACUITY_SCORE: 27
ADLS_ACUITY_SCORE: 29
ADLS_ACUITY_SCORE: 27
ADLS_ACUITY_SCORE: 29
ADLS_ACUITY_SCORE: 27
ADLS_ACUITY_SCORE: 27
ADLS_ACUITY_SCORE: 25
ADLS_ACUITY_SCORE: 27
ADLS_ACUITY_SCORE: 30
ADLS_ACUITY_SCORE: 27

## 2024-05-21 NOTE — ANESTHESIA CARE TRANSFER NOTE
Patient: Jimmy Stevens    Procedure: Procedure(s):  Right total knee arthroplasty       Diagnosis: Primary osteoarthritis of right knee [M17.11]  Diagnosis Additional Information: No value filed.    Anesthesia Type:   General     Note:    Oropharynx: oropharynx clear of all foreign objects and spontaneously breathing  Level of Consciousness: drowsy  Oxygen Supplementation: face mask    Independent Airway: airway patency satisfactory and stable  Dentition: dentition unchanged  Vital Signs Stable: post-procedure vital signs reviewed and stable  Report to RN Given: handoff report given  Patient transferred to: PACU    Handoff Report: Identifed the Patient, Identified the Reponsible Provider, Reviewed the pertinent medical history, Discussed the surgical course, Reviewed Intra-OP anesthesia mangement and issues during anesthesia, Set expectations for post-procedure period and Allowed opportunity for questions and acknowledgement of understanding      Vitals:  Vitals Value Taken Time   /96 05/21/24 1105   Temp     Pulse 0 05/21/24 1106   Resp 16 05/21/24 1106   SpO2 99 % 05/21/24 1106   Vitals shown include unfiled device data.    Electronically Signed By: AMANDA King CRNA  May 21, 2024  11:07 AM

## 2024-05-21 NOTE — ANESTHESIA PREPROCEDURE EVALUATION
Anesthesia Pre-Procedure Evaluation    Patient: Jimmy Stevens   MRN: [id number] : 1965        Procedure : Procedure(s):  Right total knee arthroplasty          Past Medical History:   Diagnosis Date    Acute stress reaction 2009    Arthritis     Essential hypertension, benign 03/15/2016    History of blood transfusion 1989    Hyperlipidemia LDL goal <160 10/31/2010    Iron deficiency anemia, unspecified     Knee pain 2009      Past Surgical History:   Procedure Laterality Date    HC REMOVAL OF OVARY/TUBE(S)  3/99    Salpingo-Oophorectomy, Unilateral    HYSTERECTOMY, PAP NO LONGER INDICATED  3/99    [facility] NONSPECIFIC PROCEDURE  10/29/99    MRI brain normal    [facility] NONSPECIFIC PROCEDURE      B breast reduction surgery    [facility] TOTAL ABDOM HYSTERECTOMY  3/99    Hysterectomy, Total Abdominal      Allergies   Allergen Reactions    H2 Antagonists      Mylan (itching)    Minocycline Hives      Social History     Tobacco Use    Smoking status: Never    Smokeless tobacco: Never   Substance Use Topics    Alcohol use: Never      Wt Readings from Last 1 Encounters:   24 91.6 kg (202 lb)        Anesthesia Evaluation   Pt has had prior anesthetic. Type: General.    No history of anesthetic complications       ROS/MED HX  ENT/Pulmonary:     (+)     CHRISTIANO risk factors,  hypertension, obese,                                Neurologic:  - neg neurologic ROS     Cardiovascular:     (+)  hypertension- -   -  - -                                      METS/Exercise Tolerance:     Hematologic: Comments: Monoclonal gammopathy of unk significance      Musculoskeletal:   (+)  arthritis,             GI/Hepatic:  - neg GI/hepatic ROS     Renal/Genitourinary:  - neg Renal ROS     Endo: Comment: Essentially class 2 obesity    (+)               Obesity,       Psychiatric/Substance Use:  - neg psychiatric ROS     Infectious Disease:  - neg infectious disease ROS     Malignancy:  - neg malignancy ROS     Other:  - neg  other ROS    (+)  , H/O Chronic Pain,         Physical Exam    Airway        Mallampati: II   TM distance: > 3 FB   Neck ROM: full   Mouth opening: > 3 cm    Respiratory Devices and Support         Dental           Cardiovascular   cardiovascular exam normal       Rhythm and rate: regular and normal     Pulmonary   pulmonary exam normal        breath sounds clear to auscultation       Other findings: Lab Test        05/07/24     12/12/23     06/15/23                       0940          1734          1336          WBC          8.0          7.8          7.8           HGB          13.2         12.8         13.0          MCV          85           88           88            PLT          346          325          338            Lab Test        05/07/24     12/12/23     06/15/23                       0940          1734          1336          NA           144          138          140           POTASSIUM    4.4          4.8          4.8           CHLORIDE     106          100          103           CO2          31           28           27            BUN          12           17.8         16.2          CR           0.90         0.96*        0.77          ANIONGAP     7            10           10            RAI          9.1          8.9          9.1           GLC          119*         102*         98                   EKG Interpretation:   Tracing from 6 months ago  Sinus rhythm with Premature atrial complexes   Possible Left atrial enlargement   Nonspecific T wave abnormality   Prolonged QT   Abnormal ECG   When compared with ECG of 08-MAR-2022 19:50,   Premature atrial complexes are now Present       OUTSIDE LABS:  CBC:   Lab Results   Component Value Date    WBC 8.0 05/07/2024    WBC 7.8 12/12/2023    HGB 13.2 05/07/2024    HGB 12.8 12/12/2023    HCT 41.5 05/07/2024    HCT 40.2 12/12/2023     05/07/2024     12/12/2023     BMP:   Lab Results   Component Value Date     05/07/2024     12/12/2023     "POTASSIUM 4.4 05/07/2024    POTASSIUM 4.8 12/12/2023    CHLORIDE 106 05/07/2024    CHLORIDE 100 12/12/2023    CO2 31 05/07/2024    CO2 28 12/12/2023    BUN 12 05/07/2024    BUN 17.8 12/12/2023    CR 0.90 05/07/2024    CR 0.96 (H) 12/12/2023     (H) 05/07/2024     (H) 12/12/2023     COAGS:   Lab Results   Component Value Date    PTT 34 04/25/2010    INR 0.96 04/25/2010     POC: No results found for: \"BGM\", \"HCG\", \"HCGS\"  HEPATIC:   Lab Results   Component Value Date    ALBUMIN 4.1 07/05/2023    PROTTOTAL 7.3 07/05/2023    ALT 11 07/05/2023    AST 16 07/05/2023    ALKPHOS 65 07/05/2023    BILITOTAL 0.4 07/05/2023     OTHER:   Lab Results   Component Value Date    A1C 5.8 (H) 02/17/2022    RAI 9.1 05/07/2024    MAG 2.2 12/12/2023    TSH 2.64 12/12/2023    T4 1.19 06/16/2003    CRP 9.3 (H) 03/08/2022    SED 37 (H) 03/08/2022       Anesthesia Plan    ASA Status:  3    NPO Status:  NPO Appropriate    Anesthesia Type: General.     - Airway: LMA   Induction: Intravenous.   Maintenance: Balanced.        Consents    Anesthesia Plan(s) and associated risks, benefits, and realistic alternatives discussed. Questions answered and patient/representative(s) expressed understanding.     - Discussed: Risks, Benefits and Alternatives for BOTH SEDATION and the PROCEDURE were discussed     - Discussed with:  Patient            Postoperative Care    Pain management: IV analgesics, Oral pain medications, Multi-modal analgesia, Peripheral nerve block (Single Shot) (infiltration by surgeon).   PONV prophylaxis: Ondansetron (or other 5HT-3), Dexamethasone or Solumedrol, Background Propofol Infusion     Comments:               Chidi Bunch MD    I have reviewed the pertinent notes and labs in the chart from the past 30 days and (re)examined the patient.  Any updates or changes from those notes are reflected in this note.              # Obesity: Estimated body mass index is 34.67 kg/m  as calculated from the following:    " "Height as of 5/7/24: 1.626 m (5' 4\").    Weight as of 5/7/24: 91.6 kg (202 lb).      "

## 2024-05-21 NOTE — OP NOTE
[facility]    Operative Note    Pre-operative diagnosis: 58-year-old female with chronic right knee pain due to end-stage osteoarthritic changes in all 3 compartments.  Insufficiently responding to nonsurgical treatment options.     Post-operative diagnosis Same as pre-operative diagnosis    Procedure: Right total knee arthroplasty, Right - Knee    Surgeon: Surgeons and Role:     * Juan Rosales MD - Primary     * Willam Chowdhury      * Сергей Perez - Resident      Anesthesia: Choice   Estimated Blood Loss: 200 ml    Drains: None  Specimens: * No specimens in log *    Complications: None.  Implants:   Implant Name Type Inv. Item Serial No.  Lot No. LRB No. Used Action   BONE CEMENT SIMPLEX FULL DOSE 6191-1-001 - [id number] Cement, Bone BONE CEMENT SIMPLEX FULL DOSE 6191-1-001  [employer] [id number] Right 1 Implanted   IMP TIBIAL ZIM PSN NP STM 5DEG SZ  -344-02 - [id number] Total Joint Component/Insert IMP TIBIAL ZIM PSN NP STM 5DEG   -214-02  [employer] [id number] Right 1 Implanted   PATELLA ALL POLY 29MM - [id number] Total Joint Component/Insert PATELLA ALL POLY 29MM  [employer] [id number] Right 1 Implanted   KNEE FEMUR CR CEMENT CCR STD SZ 5 R - [id number] Total Joint Component/Insert KNEE FEMUR CR CEMENT CCR STD SZ 5 R  [employer] [id number] Right 1 Implanted   SURFACE ARTC PERSONA 4-5 C-D KN RT TIB FX BRNG STRL LF - [id number] Total Joint Component/Insert SURFACE ARTC PERSONA 4-5 C-D KN RT TIB FX BRNG STRL LF  [employer] [id number] Right 1 Implanted       The presence of MERCEDES Peña was essential throughout this case for assistance with patient transfer to and from the operative bed, draping, irrigation, cautery usage, retraction, implant placement, cement removal, arthrotomy closure, incisional closure, dressing placement.    Components used:  Miryam Persona Size 5 Femoral Component  Miryam Persona Size D Tibial  Component  Reena Size 29 Patellar Button    Reena [facility]  Polyethylene Liner, Size 16     Description of procedure:      Patient was seen in the preoperative area where procedure, risks and complications, expectations and rehabilitation were discussed once more.  All questions were answered.  Patient understands and agrees to the treatment plan as set forth.  Informed consent was obtained and the right lower extremity was marked.  Patient was then taken to the operating room where general anesthesia was induced.     Patient was positioned supine with a bump under the ipsilateral buttock. Bony prominences were well padded and the patient was secured to the table in the standard fashion.  An SCD was placed on the nonoperative leg.  Preoperative range of motion showed a flexion/extension of 85-10-0.      A tourniquet was placed on the operative thigh and the patient was prepped and draped in the normal sterile fashion.        After the completion of a timeout procedure a standard midline incision was made. Dissection was carried down to the knee retinaculum and the quadriceps tendon. A standard medial parapatellar arthrotomy was performed. Subperiosteal dissection was carried out along the medial proximal tibia. The fat pad was removed.  Care was taken to protect the patellar tendon and extensor mechanism during fat pad removal. The tissue along the anterior aspect of the distal femur was also removed.  The patella was subluxed and the knee was flexed.       The ACL was released.  A drill was advanced down the femoral canal in a retrograde direction. The sword was set at 5 degrees of valgus in accordance with the preoperative plan and was advanced into the canal.  The distal femoral cutting block was then placed at +0 mm resection and pinned into place. The caroline was removed and the distal femur cuts were made medially and laterally.  The cutting block was removed and the caroline with the alignment piece was inserted  into the canal to ensure proper angle of the cut and a flat cut.       Attention was then directed towards the proximal tibia.  Part of the medial and lateral meniscus and soft tissue was removed to expose the medial and lateral tibial plateau. Retractors were placed around the knee to obtain good visualization, then the extramedullary tibial alignment guide was placed in the appropriate position. The 2-10 guide was used to select the resection level of the tibia and the cutting guide was pinned into position.  Great care was taken to place the proximal cutting guide in the appropriate varus/valgus and posterior slope orientation.  With care directed towards preserving the posterior nerves and blood vessels and the medial collateral ligament, the saw was used to cut the proximal tibia.  Next the 10 mm sizing block was put in place to ensure proper alignment.  It showed adequate alignment using the drop caroline as well as good balancing of soft tissues.     We turned our attention to the back to the distal femur.  The posterior referencing femoral sizing block was used and the femur was measured to be a size 5.  Two holes were drilled to obtain 3 degrees of external rotation with respect to the posterior condylar axis of the femur.  The femoral cutting block was then slid over the pins and fixed onto the bone.  The anterior cut, posterior cut, anterior and posterior chamfer cuts were made.  The block was then removed and excess bone fragments were removed.   A lamina  was used to properly visualize the posterior knee and meniscal remnants.  Using electrocautery the residual medial and lateral menisci were removed.  Care was taken to coagulate the lateral genicular vessels.  A curved osteotome was used to remove posterior osteophytes.  Great care was taken to protect the popliteus tendon laterally.  Our anesthetic injection was then introduced through the posterior capsule with extreme care directed towards not  injuring the posterior neurovascular structures. The trial components (femur size 5) were placed and confirmed appropriate sizing of the flexion and extension gaps.  The drop caroline was used to confirm proper alignment of the cut.  A range of motion of 0 to 130 degrees with a well balanced knee in both flexion and extension was obtained.  Appropriate external rotation of the tibial trial was marked.The excess bone was removed.       The patella was then exposed.  Denervation and soft tissue release around the patella for visualization utilizing electrocautery. The patella thickness was measured with a caliper to be 24, and a measured resection of 8 mm was made.  A caliper was then used to measure the residual thickness of the patella to be 16.  The patella was measured with the lollipops and found to be a size 29. The drill guide was placed and the three lug holes were drilled. The patella trial was then placed and the knee was ranged. The patella was found to track well.  Now the tourniquet was inflated to 250 mmHg.    Femoral lug holes were drilled.  All trial components were removed and the proximal tibia was again exposed and sized. The tibia was measured to be a size D.   The tibial trial was placed into proper rotation and pinned into place.  The stove pipe was used to guide the drill, and then the wing punch was used. The tibial trial was then removed.      Pulsitile lavage was used to clean the bone in preparation for cementing. The bone was dried. Cement was mixed, and then all the components were cemented into place. While the cement was hardening, the remainder of the anesthetic injection was spread around the deep retinacular layer and the subcutaneous layer with a spinal needle.  The knee was irrigated with betadine lavage for approximately 3 minutes.  Once the cement had hardened, the excess cement was removed.    The tourniquet was released.  Hemostasis was obtained.       The MC 16 mm liner fit best and  the knee had full extension to 130 degrees and was stable to anterior and posterior stress in flexion and extension as well as varus/valgus stress in 0, 30, and 90 degrees of flexion.  The trial liner was removed and the real liner was placed.       The knee was again copiously irrigated.   Closure was performed with a #1  Vicryl interrupted sutures in the retinacular layer, 0 Vicryl and 2-0 Vicryl interrupted suture in the  subcutaneous tissues, and 3-0 monocryl in the skin.  A sterile dressing was applied.  The patient was then awakened, transferred to the [facility], and brought to the recovery room in stable condition. There were no complications.     POSTOPERATIVE PLAN:  Weight bearing: Weightbearing as tolerated  Anticoagulation: Aspirin 162 mg daily for 4 weeks  Precautions: No precautions  Pain control and monitoring  PT/OT  Antibiotics per protocol  X-ray in PACU  Discharge today or tomorrow when pain well controlled and ambulating safely.        Juan Rosales MD      Adult Reconstruction  [facility] Department of Orthopaedic Surgery  Pager [phone]

## 2024-05-21 NOTE — PLAN OF CARE
Goal Outcome Evaluation:      Plan of Care Reviewed With: patient    Overall Patient Progress: improvingOverall Patient Progress: improving     Patient vital signs are at baseline: No,  Reason:  3L/min of O2  Patient able to ambulate as they were prior to admission or PT with assist devices provided by therapies during their stay:  No,  Reason:  too drowsy to get out of bed at this time.   Patient MUST void prior to discharge:  No,  Reason:  patient did not get OOB, denies having to void  Patient able to tolerate oral intake:  Yes, sips of water  Pain has adequate pain control using Oral analgesics:  Yes, patient denied pain  Does patient have an identified :  Yes, colletteer at bedside    Problem: Adult Inpatient Plan of Care  Goal: Plan of Care Review  Outcome: Progressing  Flowsheets (Taken 5/21/2024 1612)  Plan of Care Reviewed With: patient  Overall Patient Progress: improving  Goal: Patient-Specific Goal (Individualized)  Outcome: Progressing  Goal: Absence of Hospital-Acquired Illness or Injury  Outcome: Progressing  Goal: Optimal Comfort and Wellbeing  Outcome: Progressing  Goal: Readiness for Transition of Care  Outcome: Progressing     Problem: Pain Acute  Goal: Optimal Pain Control and Function  Outcome: Progressing     Problem: Fall Injury Risk  Goal: Absence of Fall and Fall-Related Injury  Outcome: Progressing     Problem: Knee Arthroplasty  Goal: Optimal Coping  Outcome: Progressing  Goal: Absence of Bleeding  Outcome: Progressing  Goal: Effective Bowel Elimination  Outcome: Progressing  Goal: Fluid and Electrolyte Balance  Outcome: Progressing  Goal: Optimal Functional Ability  Outcome: Progressing  Goal: Absence of Infection Signs and Symptoms  Outcome: Progressing  Goal: Intact Neurovascular Status  Outcome: Progressing  Goal: Anesthesia/Sedation Recovery  Outcome: Progressing  Goal: Optimal Pain Control and Function  Outcome: Progressing  Goal: Nausea and Vomiting Relief  Outcome:  Progressing  Goal: Effective Urinary Elimination  Outcome: Progressing  Goal: Effective Oxygenation and Ventilation  Outcome: Progressing

## 2024-05-21 NOTE — ANESTHESIA POSTPROCEDURE EVALUATION
Patient: Jimmy Stevens    Procedure: Procedure(s):  Right total knee arthroplasty       Anesthesia Type:  General    Note:  Disposition: Inpatient   Postop Pain Control: Uneventful            Sign Out: Well controlled pain   PONV: No   Neuro/Psych: Uneventful            Sign Out: Acceptable/Baseline neuro status   Airway/Respiratory: Uneventful            Sign Out: Acceptable/Baseline resp. status   CV/Hemodynamics: Uneventful            Sign Out: Acceptable CV status; No obvious hypovolemia; No obvious fluid overload   Other NRE: NONE   DID A NON-ROUTINE EVENT OCCUR? No           Last vitals:  Vitals Value Taken Time   /73 05/21/24 1207   Temp 97  F (36.1  C) 05/21/24 1110   Pulse 99 05/21/24 1209   Resp 11 05/21/24 1209   SpO2 94 % 05/21/24 1209   Vitals shown include unfiled device data.    Electronically Signed By: Chidi Bunch MD  May 21, 2024  12:10 PM

## 2024-05-21 NOTE — PROGRESS NOTES
05/21/24 1647   Appointment Info   Signing Clinician's Name / Credentials (PT) KRISHNA Whitt   Rehab Comments (PT) RLE WBAT   Living Environment   People in Home child(lazara), adult   Current Living Arrangements apartment   Home Accessibility stairs to enter home   Number of Stairs, Within Home, Primary greater than 10 stairs  (~40 stairs to enter (3rd floor))   Stair Railings, Within Home, Primary railings safe and in good condition   Transportation Anticipated family or friend will provide   Living Environment Comments Pt lives with daughter. 3 flights of stairs to access apartment. Daughter able to assist but limited physical assist/ weight lifting d/t own history.   Self-Care   Usual Activity Tolerance good   Current Activity Tolerance fair   Regular Exercise No   Equipment Currently Used at Home cane, straight   Fall history within last six months yes   Number of times patient has fallen within last six months 3   Activity/Exercise/Self-Care Comment Pt reports using cane at baseline. Mostly IND at baseline, but increased time and limited d/t pain.   General Information   Onset of Illness/Injury or Date of Surgery 05/21/24   Referring Physician Juan Rosales MD   Patient/Family Therapy Goals Statement (PT) return home   Pertinent History of Current Problem (include personal factors and/or comorbidities that impact the POC) Pt is 59 yo female who underwent R TKA on 5/21/2024.   Existing Precautions/Restrictions fall;weight bearing   Weight-Bearing Status - RLE weight-bearing as tolerated   Cognition   Affect/Mental Status (Cognition) WFL   Orientation Status (Cognition) oriented x 3   Follows Commands (Cognition) WFL   Pain Assessment   Patient Currently in Pain Yes, see Vital Sign flowsheet   Integumentary/Edema   Integumentary/Edema Comments RLE ACE bandage intact   Posture    Posture Forward head position   Range of Motion (ROM)   ROM Comment R knee 0-50 degrees   Strength (Manual Muscle Testing)    Strength (Manual Muscle Testing) Deficits observed during functional mobility   Bed Mobility   Comment, (Bed Mobility) supine<>sit with CGA, HOB raised   Transfers   Comment, (Transfers) sit<>stand with FWW and min A, slow to rise   Gait/Stairs (Locomotion)   Distance in Feet (Gait) 10   Comment, (Gait/Stairs) amb with FWW and min A, slow, step-to   Balance   Balance Comments impaired; needing FWW and min A   Sensory Examination   Sensory Perception patient reports no sensory changes   Clinical Impression   Criteria for Skilled Therapeutic Intervention Yes, treatment indicated   PT Diagnosis (PT) impaired functional mobility   Influenced by the following impairments weakness, pain, post op status   Functional limitations due to impairments difficulty with bed mobility, transfers, ambulation, stairs   Clinical Presentation (PT Evaluation Complexity) stable   Clinical Presentation Rationale clinical judgement   Clinical Decision Making (Complexity) low complexity   Planned Therapy Interventions (PT) balance training;bed mobility training;gait training;home exercise program;neuromuscular re-education;patient/family education;ROM (range of motion);stair training;strengthening;transfer training;progressive activity/exercise;risk factor education;home program guidelines   Risk & Benefits of therapy have been explained evaluation/treatment results reviewed;care plan/treatment goals reviewed;risks/benefits reviewed;current/potential barriers reviewed;participants voiced agreement with care plan;participants included;patient   PT Total Evaluation Time   PT Eval, Low Complexity Minutes (97615) 10   Physical Therapy Goals   PT Frequency Daily   PT Predicted Duration/Target Date for Goal Attainment 05/24/24   PT Goals Bed Mobility;Transfers;Gait;Stairs   PT: Bed Mobility Supervision/stand-by assist;Supine to/from sit   PT: Transfers Supervision/stand-by assist;Sit to/from stand;Assistive device   PT: Gait Supervision/stand-by  assist;Assistive device;150 feet   PT: Stairs Supervision/stand-by assist;Greater than 10 stairs;Rail on right;Assistive device   PT Discharge Planning   PT Plan PT: TKA handout, IND bed mob, repeated STS, progress gait, trial flights of stairs   PT Discharge Recommendation (DC Rec)   (defer to ortho)   PT Rationale for DC Rec Pt is below baseline functional mobility. Pt lives with daughter and has 3 flights of stairs to enter apatment. Pt will need to be able to complete stairs and be SBA with ambulation to d/c home, may need additional time. Pt will need FWW for d/c.   PT Brief overview of current status A x 1 with FWW   Total Session Time   Total Session Time (sum of timed and untimed services) 10

## 2024-05-21 NOTE — ANESTHESIA PROCEDURE NOTES
Airway       Patient location during procedure: OR       Procedure Start/Stop Times: 5/21/2024 9:15 AM  Staff -        CRNA: Enriqueta Anthony APRN CRNA       Performed By: CRNA  Consent for Airway        Urgency: elective  Indications and Patient Condition       Indications for airway management: shan-procedural       Induction type:intravenous       Mask difficulty assessment: 1 - vent by mask    Final Airway Details       Final airway type: supraglottic airway    Supraglottic Airway Details        Type: LMA       Brand: I-Gel       LMA size: 4    Post intubation assessment        Placement verified by: capnometry        Number of attempts at approach: 1       Secured with: commercial tube cox       Ease of procedure: easy       Dentition: Intact and Unchanged    Medication(s) Administered   Medication Administration Time: 5/21/2024 9:15 AM

## 2024-05-21 NOTE — PROGRESS NOTES
Patient was very itchy. IV Benadryl was given (25mg) at 1127.    Christine Adrian RN on 5/21/2024 at 12:10 PM

## 2024-05-22 ENCOUNTER — APPOINTMENT (OUTPATIENT)
Dept: PHYSICAL THERAPY | Facility: CLINIC | Age: 59
End: 2024-05-22
Attending: STUDENT IN AN ORGANIZED HEALTH CARE EDUCATION/TRAINING PROGRAM
Payer: COMMERCIAL

## 2024-05-22 ENCOUNTER — MYC MEDICAL ADVICE (OUTPATIENT)
Dept: ORTHOPEDICS | Facility: CLINIC | Age: 59
End: 2024-05-22

## 2024-05-22 ENCOUNTER — APPOINTMENT (OUTPATIENT)
Dept: OCCUPATIONAL THERAPY | Facility: CLINIC | Age: 59
End: 2024-05-22
Attending: STUDENT IN AN ORGANIZED HEALTH CARE EDUCATION/TRAINING PROGRAM
Payer: COMMERCIAL

## 2024-05-22 VITALS
OXYGEN SATURATION: 98 % | TEMPERATURE: 98.1 F | HEIGHT: 64 IN | DIASTOLIC BLOOD PRESSURE: 75 MMHG | BODY MASS INDEX: 34.57 KG/M2 | SYSTOLIC BLOOD PRESSURE: 144 MMHG | HEART RATE: 103 BPM | RESPIRATION RATE: 16 BRPM | WEIGHT: 202.5 LBS

## 2024-05-22 LAB
FASTING STATUS PATIENT QL REPORTED: YES
GLUCOSE SERPL-MCNC: 159 MG/DL (ref 70–99)
HGB BLD-MCNC: 10.3 G/DL (ref 11.7–15.7)
LACTATE SERPL-SCNC: 2.6 MMOL/L (ref 0.7–2)
LACTATE SERPL-SCNC: 3.5 MMOL/L (ref 0.7–2)

## 2024-05-22 PROCEDURE — 97165 OT EVAL LOW COMPLEX 30 MIN: CPT | Mod: GO | Performed by: REHABILITATION PRACTITIONER

## 2024-05-22 PROCEDURE — 97116 GAIT TRAINING THERAPY: CPT | Mod: GP | Performed by: PHYSICAL THERAPIST

## 2024-05-22 PROCEDURE — 250N000011 HC RX IP 250 OP 636: Performed by: STUDENT IN AN ORGANIZED HEALTH CARE EDUCATION/TRAINING PROGRAM

## 2024-05-22 PROCEDURE — 97535 SELF CARE MNGMENT TRAINING: CPT | Mod: GO | Performed by: REHABILITATION PRACTITIONER

## 2024-05-22 PROCEDURE — 97530 THERAPEUTIC ACTIVITIES: CPT | Mod: GP

## 2024-05-22 PROCEDURE — 82947 ASSAY GLUCOSE BLOOD QUANT: CPT | Performed by: STUDENT IN AN ORGANIZED HEALTH CARE EDUCATION/TRAINING PROGRAM

## 2024-05-22 PROCEDURE — 250N000013 HC RX MED GY IP 250 OP 250 PS 637: Performed by: STUDENT IN AN ORGANIZED HEALTH CARE EDUCATION/TRAINING PROGRAM

## 2024-05-22 PROCEDURE — 97530 THERAPEUTIC ACTIVITIES: CPT | Mod: GP | Performed by: PHYSICAL THERAPIST

## 2024-05-22 PROCEDURE — 36415 COLL VENOUS BLD VENIPUNCTURE: CPT | Performed by: STUDENT IN AN ORGANIZED HEALTH CARE EDUCATION/TRAINING PROGRAM

## 2024-05-22 PROCEDURE — 36415 COLL VENOUS BLD VENIPUNCTURE: CPT | Performed by: HOSPITALIST

## 2024-05-22 PROCEDURE — 83605 ASSAY OF LACTIC ACID: CPT | Mod: 91 | Performed by: HOSPITALIST

## 2024-05-22 PROCEDURE — 83605 ASSAY OF LACTIC ACID: CPT | Performed by: HOSPITALIST

## 2024-05-22 PROCEDURE — 97110 THERAPEUTIC EXERCISES: CPT | Mod: GP

## 2024-05-22 PROCEDURE — 85018 HEMOGLOBIN: CPT | Performed by: STUDENT IN AN ORGANIZED HEALTH CARE EDUCATION/TRAINING PROGRAM

## 2024-05-22 PROCEDURE — 258N000003 HC RX IP 258 OP 636: Performed by: STUDENT IN AN ORGANIZED HEALTH CARE EDUCATION/TRAINING PROGRAM

## 2024-05-22 RX ORDER — LISINOPRIL 20 MG/1
TABLET ORAL
Status: SHIPPED
Start: 2024-05-22 | End: 2024-07-17

## 2024-05-22 RX ORDER — AMLODIPINE BESYLATE 5 MG/1
5 TABLET ORAL DAILY
Status: SHIPPED
Start: 2024-05-22 | End: 2024-07-17

## 2024-05-22 RX ADMIN — OXYCODONE HYDROCHLORIDE 5 MG: 5 TABLET ORAL at 00:51

## 2024-05-22 RX ADMIN — POLYETHYLENE GLYCOL 3350 17 G: 17 POWDER, FOR SOLUTION ORAL at 07:52

## 2024-05-22 RX ADMIN — GABAPENTIN 600 MG: 300 CAPSULE ORAL at 15:40

## 2024-05-22 RX ADMIN — OXYCODONE HYDROCHLORIDE 5 MG: 5 TABLET ORAL at 12:55

## 2024-05-22 RX ADMIN — OXYCODONE HYDROCHLORIDE 10 MG: 10 TABLET ORAL at 17:01

## 2024-05-22 RX ADMIN — KETOROLAC TROMETHAMINE 15 MG: 15 INJECTION, SOLUTION INTRAMUSCULAR; INTRAVENOUS at 10:11

## 2024-05-22 RX ADMIN — ACETAMINOPHEN 975 MG: 325 TABLET, FILM COATED ORAL at 07:51

## 2024-05-22 RX ADMIN — AMLODIPINE BESYLATE 5 MG: 5 TABLET ORAL at 07:51

## 2024-05-22 RX ADMIN — SENNOSIDES AND DOCUSATE SODIUM 1 TABLET: 50; 8.6 TABLET ORAL at 07:51

## 2024-05-22 RX ADMIN — GABAPENTIN 600 MG: 300 CAPSULE ORAL at 07:52

## 2024-05-22 RX ADMIN — ACETAMINOPHEN 975 MG: 325 TABLET, FILM COATED ORAL at 15:40

## 2024-05-22 RX ADMIN — CEFAZOLIN SODIUM 2 G: 2 INJECTION, SOLUTION INTRAVENOUS at 00:38

## 2024-05-22 RX ADMIN — LISINOPRIL 20 MG: 20 TABLET ORAL at 07:51

## 2024-05-22 RX ADMIN — METHOCARBAMOL 750 MG: 750 TABLET ORAL at 16:05

## 2024-05-22 RX ADMIN — ASPIRIN 81 MG: 81 TABLET, COATED ORAL at 07:51

## 2024-05-22 RX ADMIN — KETOROLAC TROMETHAMINE 15 MG: 15 INJECTION, SOLUTION INTRAMUSCULAR; INTRAVENOUS at 04:40

## 2024-05-22 RX ADMIN — SODIUM CHLORIDE, POTASSIUM CHLORIDE, SODIUM LACTATE AND CALCIUM CHLORIDE 1000 ML: 600; 310; 30; 20 INJECTION, SOLUTION INTRAVENOUS at 10:02

## 2024-05-22 ASSESSMENT — ACTIVITIES OF DAILY LIVING (ADL)
ADLS_ACUITY_SCORE: 36
ADLS_ACUITY_SCORE: 30
ADLS_ACUITY_SCORE: 36
ADLS_ACUITY_SCORE: 30
ADLS_ACUITY_SCORE: 32
ADLS_ACUITY_SCORE: 32
ADLS_ACUITY_SCORE: 30
ADLS_ACUITY_SCORE: 32
ADLS_ACUITY_SCORE: 36
ADLS_ACUITY_SCORE: 32
ADLS_ACUITY_SCORE: 36
ADLS_ACUITY_SCORE: 32
ADLS_ACUITY_SCORE: 36

## 2024-05-22 NOTE — PLAN OF CARE
Patient vital signs are at baseline: Yes  Patient able to ambulate as they were prior to admission or with assist devices provided by therapies during their stay:  Yes  Patient MUST void prior to discharge:  Yes, assist of 1, walker, and gait belt.  Patient able to tolerate oral intake:  Yes  Pain has adequate pain control using Oral analgesics:  Yes, oxycodone and tylenol.   Does patient have an identified :  Yes, daughter.  Has goal D/C date and time been discussed with patient:  Yes, possibly to home today.    Pt doing well. LS clear -encouraged IS use. CMS intact with no numbness/tingling. Drsg CDI. Pain tolerated with good relief from ice applications and pain medications. Lactic has trended down to 3.5 from 5.5 with another recheck performed with AM labs today. Ambulated in hallways this AM. Possible discharge to home today once meets criteria.

## 2024-05-22 NOTE — PLAN OF CARE
Goal Outcome Evaluation:    Patient vital signs are at baseline: No,  Reason:  patient tachycardic, BP soft   Patient able to ambulate as they were prior to admission or with assist devices provided by therapies during their stay:  No,  Reason:  requires Ax1 GB & walker  Patient MUST void prior to discharge:  Yes  Patient able to tolerate oral intake:  Yes  Pain has adequate pain control using Oral analgesics:  Yes  Does patient have an identified :  Yes  Has goal D/C date and time been discussed with patient:  No,  Reason:  pending test results from UA, blood cultures dt sepsis trigger    A&O x4, drowsy at start of shift   Regular diet, tolerated well   Ax1 GB & walker, ambulated with PT  CMS intact, dressing CDI   LR IV bolus completed 500 mL/hr for 2 hours   Voiding well at bedside commode  On RA   Pain managed with Ketorolac, Tylenol, Oxycodone, Gabapentin   Surgeon notified of critical lab results 3.0 lactic acid at 3.0 at 1642 & 5.5 at 1847, consult for hospitalist obtained & provider evaluated at bedside.   Urine specimen sent results pending   Chest xray showed lungs clear bilaterally, mild cardiac enlargement, hospitalist notified  LR running 50 mL/hr   Paged cross cover for new lactic acid recheck         Plan of Care Reviewed With: patient, child    Overall Patient Progress: no changeOverall Patient Progress: no change

## 2024-05-22 NOTE — PROVIDER NOTIFICATION
0830:  Hello- Lactic acid came back this morning at 2.6. Please advise. Thanks      Provider response:

## 2024-05-22 NOTE — PROGRESS NOTES
05/22/24 1411   Appointment Info   Signing Clinician's Name / Credentials (OT) Angelika Dahl, OTR/L   Living Environment   People in Home alone   Current Living Arrangements apartment   Home Accessibility stairs to enter home   Number of Stairs, Main Entrance greater than 10 stairs   Transportation Anticipated family or friend will provide   Living Environment Comments Pt lives with daughter. 3 flights of stairs to access apartment. Daughter able to assist but limited physical assist/ weight lifting d/t own history.   Self-Care   Usual Activity Tolerance good   Current Activity Tolerance fair   Regular Exercise No   Equipment Currently Used at Home cane, straight   Fall history within last six months yes   Number of times patient has fallen within last six months 3   Activity/Exercise/Self-Care Comment Pt reports using cane at baseline. Mostly IND at baseline, but increased time and limited d/t pain. Pt works at [employer], able to sit or benji, plans to take 6 weeks off.   Instrumental Activities of Daily Living (IADL)   IADL Comments dtr to complete as needed   General Information   Onset of Illness/Injury or Date of Surgery 05/21/24   Referring Physician Juan Rosales MD   Patient/Family Therapy Goal Statement (OT) to return home   Additional Occupational Profile Info/Pertinent History of Current Problem Pt is 59 yo female who underwent R TKA on 5/21/2024.   Existing Precautions/Restrictions fall   Right Lower Extremity (Weight-bearing Status) weight-bearing as tolerated (WBAT)   General Observations and Info patient was in recliner, just finished PT session   Cognitive Status Examination   Orientation Status orientation to person, place and time   Sensory   Sensory Quick Adds sensation intact   Pain Assessment   Patient Currently in Pain Yes, see Vital Sign flowsheet  (rating pain 6/10)   Posture   Posture not impaired   Range of Motion Comprehensive   General Range of Motion bilateral upper extremity ROM WNL    Strength Comprehensive (MMT)   Comment, General Manual Muscle Testing (MMT) Assessment decreased strength in R LE.   Muscle Tone Assessment   Muscle Tone Quick Adds No deficits were identified   Coordination   Upper Extremity Coordination No deficits were identified   Transfers   Transfers shower transfer;toilet transfer;sit-stand transfer   Sit-Stand Transfer   Sit-Stand Bertie (Transfers) contact guard;verbal cues   Assistive Device (Sit-Stand Transfers) walker, front-wheeled   Shower Transfer   Type (Shower Transfer)   (min A, fww, tub chair for tub/shower)   Toilet Transfer   Type (Toilet Transfer) stand-sit   Bertie Level (Toilet Transfer) contact guard;verbal cues   Balance   Balance Comments LOB was not noted, general unsteadiness to be expected   Activities of Daily Living   BADL Assessment/Intervention lower body dressing   Lower Body Dressing Assessment/Training   Bertie Level (Lower Body Dressing) minimum assist (75% patient effort);socks;don   Clinical Impression   Criteria for Skilled Therapeutic Interventions Met (OT) Yes, treatment indicated   OT Diagnosis decreased ADL/IADLs   OT Problem List-Impairments impacting ADL activity tolerance impaired;balance;pain;strength;range of motion (ROM)   Assessment of Occupational Performance 5 or more Performance Deficits   Identified Performance Deficits Dsg, toileting, bathing, functional/community mobility, household chores, driving, errands   Planned Therapy Interventions (OT) ADL retraining;progressive activity/exercise;transfer training   Clinical Decision Making Complexity (OT) problem focused assessment/low complexity   Risk & Benefits of therapy have been explained evaluation/treatment results reviewed;care plan/treatment goals reviewed;risks/benefits reviewed;current/potential barriers reviewed;participants voiced agreement with care plan;patient   OT Total Evaluation Time   OT Eval, Low Complexity Minutes (89983) 8   OT Goals    Therapy Frequency (OT) One time eval and treatment   OT Predicted Duration/Target Date for Goal Attainment 05/22/24   OT Goals Lower Body Dressing;Toilet Transfer/Toileting;Transfers;OT Goal 1;OT Goal 2   OT: Lower Body Dressing Supervision/stand-by assist;Goal Met   OT: Transfer Supervision/stand-by assist;with assistive device;Goal Met  (tub transfer)   OT: Toilet Transfer/Toileting Supervision/stand-by assist;toilet transfer;using adaptive equipment;Goal Met   OT: Goal 1 Patient will verbalize at least 2-3 adaptations to ADLs routines at home to accommodate energy level and safety needs.- goal met   OT: Goal 2 Patient will demonstrate safe use of walker during ADLs.- goal met   OT Discharge Planning   OT Plan dc   OT Rationale for DC Rec defer to ortho team for dc plan- patient has met needed goals for safe discharge home with family to A as needed with IADL's; household chores, driving, errands, cooking and bathing. Recommended AE; LHS, reacher, patient has all other needed AE. Will discharge from OT services.   OT Brief overview of current status SBA, fww transfers, functional mobility and basic ADLs   Total Session Time   Total Session Time (sum of timed and untimed services) 8

## 2024-05-22 NOTE — PROGRESS NOTES
Occupational Therapy Discharge Summary    Reason for therapy discharge:    All goals and outcomes met, no further needs identified.    Progress towards therapy goal(s). See goals on Care Plan in [facility] electronic health record for goal details.  Goals met    Therapy recommendation(s):    No further therapy is recommended.

## 2024-05-22 NOTE — DISCHARGE SUMMARY
[facility]  Discharge Summary            Interval History:     58 year old female admitted postoperatively for elective Right TKA  with Dr. Juan Rosales due to DJD unresponsive to non operative treatment.  There were no notable intraoperative complications.  Patient being discharged post op Day #1.  Jimmy Stevens received skilled nursing, PT, OT, SW inquiry.  There was also Hospitalist care during the stay.  Pt triggered Lactic acid elevation protocol and was worked up with procalcitonin, CBC, chest XR and UA, all which were as expected and showed no concerns per hospitatlist.  Fluid resuscitation initialed and LA labs reduced.     Jimmy Stevens has progressed satisfactorily with ambulation and pain management and without medical complication.  Patient is confident in their discharge and has  met goals with PT and OT. Pt lives at home with daughter and will be discharged to home. based on home living conditions and level of support.  Jimmy Stevens leaves the hospital in stable condition with good chance for recovery.  Today: overall tired but doing well.  Pain managed, eating, voiding, mild lightheaded when up.  Has 32 steps to get to apt.  Daughter present and attentive.    Pain: tolerable.   Nausea: none.  Light headedness : none  Chest pain: none  Shortness of breath: none              Assessment and Plan:     S/P R TKA Day #1.  Final Diagnosis: same.  VSS, Hemodynamically asymptomatic, pain management adequate.    PLAN:  DVT prophylaxis with daily aspirin, as patient has no history of VTE.  Pain management with tylenol, oxycodone, ibp.  Hold BP meds at home for systolic <140 for a few days.  Bowel Regimen.  RICE  Assistive devices as determined by Physical therapy.  Home care PT as there are multiple stairs to enter home.  Patient instructions attached to discharge.      Discharge to HOME.  Follow up in clinic as previously scheduled, approx 10-14 days post op.    [facility]  [address]   [place]  [address]  [phone]  [phone] fax  [phone] for scheduling                      Physical Exam:   Patient Vitals for the past 12 hrs:   BP Temp Temp src Pulse Resp SpO2   05/22/24 0748 119/68 97.5  F (36.4  C) Temporal 96 18 98 %   05/22/24 0332 109/73 96.8  F (36  C) Temporal 90 18 97 %   05/22/24 0149 -- -- -- -- 15 93 %   05/22/24 0036 127/78 97.9  F (36.6  C) Temporal 97 18 98 %   05/21/24 2125 131/79 98.1  F (36.7  C) Temporal 110 18 96 %     Hemoglobin   Date Value Ref Range Status   05/22/2024 10.3 (L) 11.7 - 15.7 g/dL Final   05/21/2024 11.5 (L) 11.7 - 15.7 g/dL Final   04/08/2021 13.4 11.7 - 15.7 g/dL Final   11/27/2019 13.5 11.7 - 15.7 g/dL Final       Patient is alert and oriented.  Vitals: stable  Neurovascular status: Grossly intact to RLE, SLR able.  Dressing: clean and dry  Calves: soft and non tender          Miko Waterman PA-C  [facility] - Surgery    5/22/2024

## 2024-05-22 NOTE — TELEPHONE ENCOUNTER
STD Form was completed by Chente Waterman PA-C and faxed to [facility] at: [phone]. Confirmed it went through via Rightfax.   Copy sent to scan, copy put in drawer, and copy left at  for daughter to  with her FMLA forms.     2d2c message sent to patient informing her of the above.     VANNESA Truong RN

## 2024-05-22 NOTE — PROGRESS NOTES
RN from [id number]:    Patient vital signs are at baseline: Yes  Patient able to ambulate as they were prior to admission or with assist devices provided by therapies during their stay:  Yes-Ax1, using gait belt, and walker.  Patient MUST void prior to discharge:  Yes  Patient able to tolerate oral intake:  Yes  Pain has adequate pain control using Oral analgesics:  Yes-PO oxycodone, robaxin, and scheduled tylenol.  Does patient have an identified :  Yes-daughter.   Has goal D/C date and time been discussed with patient:  Yes     Paged Chente-ortho PA regarding patient request of home PT-per Chente he will place orders for this. Social work notified and stated they would send referrals.     Reviewed discharge instructions with patient and daughter. Questions answered. Patient discharged to home via brother with filled medications, discharge instructions, and belongings.

## 2024-05-22 NOTE — PROGRESS NOTES
Brief update:  Lactic much improved at 2.5 with fluid resuscitation.  Low concern for infection.  Extremities are warm and well perfused.     Patient endorses some lightheadedness when standing so suspect ongoing dehydration and possible orthostatic hypotension.  Will give 1L LR and check orthostatic vitals.      She is on amlodipine and lisinopril at home.  If blood pressure remains low normal, would recommend to hold both and have her resume them when her blood pressure is persistently >140 systolic.     If patient is feeling well later morning/early afternoon, ok for discharge from medicine standpoint.  No need to recheck lactic acid.       Hospital medicine service will sign off.  Please call with questions or concerns.       Martinez Kilpatrick, DO

## 2024-05-22 NOTE — DISCHARGE INSTRUCTIONS
You blood pressure may be lower than usual following surgery for a few day.    I recommend you take your blood pressure morning and night.    If the higher number of the blood pressure reading (systolic pressure) is under 140 mmHg, do not take the blood pressure medication.    Over time, your blood pressure will return to usual and you can resume your medications as they are prescribed.    Please contact your primary physician for any further questions.

## 2024-05-22 NOTE — CONSULTS
[facility]    Hospitalist Consult Note    Name: Jimmy Stevens    MRN: [id number]  YOB: 1965    Age: 58 year old  Date of admission: 5/21/2024  Primary care provider: Nick Calderon         Requesting Physician:  Cortez Mae DO    Reason for consult:  triggered sepsis protocol       Assessment:      Jimmy Stevens is a 58 year old female who was admitted for right total knee arthroplasty done on 05/21/24 by Juan Rosales MD. Hospital service was consulted for postoperative management and concern for sepsis.    Electronic medical record reviewed, patient was seen and examined at bedside.  Patient has a past medical history significant for hypertension, hyperlipidemia, history of Guillain-Barré syndrome, idiopathic neuropathy and morbid obesity.    Patient was monitored postoperatively and sepsis protocol triggered.  Lactic acid was elevated at 3.0 initiated 4:24 PM on May 21.  Interval is increased to 5.5 at 6:23 PM.  Stat hospitalist consultation was requested    Preoperative labs on May 7 was unremarkable for kidney disease.  Hemoglobin 13.2 at bedtime.    #1.  Status post right knee arthroplasty: Elective, postoperative day #0.  -- Stable hemodynamics postoperatively except for tachycardia.  On perioperative antibiotic prophylaxis with cefazolin 2 g every 8 hours.  Now postop labs available except for lactic acid.    #2.  Elevated lactic acid  -- Initial lactic acid of 3.0 postoperatively which increased to 5.5.  -- Patient is afebrile, not hypotensive.  Mildly tachycardic.  -- Etiology likely due to surgery.  I doubt severe sepsis as postoperative infection is unlikely at this stage.    #3.  Hypertension on amlodipine 5 mg daily PTA, lisinopril 20 mg twice a day PTA,    #4.  Idiopathic neuropathy on gabapentin 600 mg 3 times a day PTA        Recommendations:    Workup for infectious etiology-check urinalysis, reflex culture, 2 sets of blood culture, chest x-ray,  pro-calcitonin  Check postoperative CBC and BMP  Administer a liter of LR and repeat lactic acid in 2 hours  Continue postoperative care including pain control, hemoglobin monitoring and transfusion as needed to keep hemoglobin 7 or above, PT and OT and pain control per primary team                 History of Present Illness:     Jimmy Stevens is a 58 year old female who was admitted for right knee arthroplasty  Date of procedure: Today  I was asked to consult for evaluation for sepsis and postoperative medical management  Patient seen and examined,preop documentation, operative records,PTA medications and staff note reviewed.Eastimated blood loss: 200 mL    Patients current complaint include: No complaints.  Pain control is fair.  No shortness of breath, fevers chills or urinary symptoms.    Pain control is optimal on current medications.    Care was discussed with bedside nurse Sabra                Past Medical History:     Past Medical History:   Diagnosis Date    Acute stress reaction 09/09/2009    Arthritis     Essential hypertension, benign 03/15/2016    History of blood transfusion 1989    Hyperlipidemia LDL goal <160 10/31/2010    Iron deficiency anemia, unspecified     Knee pain 09/09/2009             Past Surgical History:     Past Surgical History:   Procedure Laterality Date     REMOVAL OF OVARY/TUBE(S)  3/99    Salpingo-Oophorectomy, Unilateral    HYSTERECTOMY, PAP NO LONGER INDICATED  3/99    [facility] NONSPECIFIC PROCEDURE  10/29/99    MRI brain normal    [facility] NONSPECIFIC PROCEDURE  02/00    B breast reduction surgery    [facility] TOTAL ABDOM HYSTERECTOMY  3/99    Hysterectomy, Total Abdominal               Social History:     Social History     Tobacco Use    Smoking status: Never    Smokeless tobacco: Never   Substance Use Topics    Alcohol use: Never             Family History:     Family History   Problem Relation Age of Onset    Genitourinary Problems Mother         B:1942 Alive    Family History  Negative Father         B:1940 Alive    Family History Negative Sister         5 sisters all healthy    Family History Negative Brother         1 brother healthy             Allergies:     Allergies   Allergen Reactions    H2 Antagonists      Mylan (itching)    Minocycline Hives             Medications:     Current Facility-Administered Medications   Medication Dose Route Frequency Provider Last Rate Last Admin    acetaminophen (TYLENOL) tablet 975 mg  975 mg Oral Q8H Juan Rosales MD   975 mg at 05/21/24 1617    [START ON 5/22/2024] amLODIPine (NORVASC) tablet 5 mg  5 mg Oral Daily Juan Rosales MD        aspirin EC tablet 81 mg  81 mg Oral BID Juan Rosales MD        ceFAZolin (ANCEF) 2 g in 100 mL D5W intermittent infusion  2 g Intravenous Q8H Juan Rosales  mL/hr at 05/21/24 1735 2 g at 05/21/24 1735    gabapentin (NEURONTIN) capsule 600 mg  600 mg Oral TID Juan Rosales MD        ketorolac (TORADOL) injection 15 mg  15 mg Intravenous Q6H Juan Rosales MD   15 mg at 05/21/24 1617    lisinopril (ZESTRIL) tablet 20 mg  20 mg Oral Daily Juan Rosales MD   20 mg at 05/21/24 1747    [START ON 5/22/2024] polyethylene glycol (MIRALAX) Packet 17 g  17 g Oral Daily Juan Rosales MD        ROPivacaine (NAROPIN) 5 MG/ mg, ketorolac (TORADOL) 30 mg, EPINEPHrine (ADRENALIN) 0.6 mg in sodium chloride 0.9 % 100 mL (ORTHO EDNA STANDARD DOSE)   INTRA-ARTICULAR On Call to OR Juan Rosales MD        senna-docusate (SENOKOT-S/PERICOLACE) 8.6-50 MG per tablet 1 tablet  1 tablet Oral BID Juan Rosales MD        sodium chloride (PF) 0.9% PF flush 3 mL  3 mL Intracatheter Q8H Juan Rosales MD                 Review of Systems:     A comprehensive greater than 10 system review of systems was carried out.  Pertinent positives and negatives are noted above.  Otherwise negative for contributory info.            Physical Exam:     All vitals have been reviewed  Blood pressure  "129/78, pulse 104, temperature 97.9  F (36.6  C), temperature source Temporal, resp. rate 19, height 1.626 m (5' 4.02\"), weight 91.9 kg (202 lb 8 oz), SpO2 93%, not currently breastfeeding.  I/O last 3 completed shifts:  In: 1300 [I.V.:1300]  Out: 100 [Blood:100]  Constitutional:   awake, alert, cooperative, and no apparent distress     Neck:   supple, symmetrical, trachea midline, skin normal, and no stridor     Lungs:   no increased work of breathing, good air exchange, no retractions, and clear to auscultation     Cardiovascular:   normal apical pulses  and normal S1 and S2     Abdomen:   normal bowel sounds, soft, non-distended, and non-tender     Neurologic:   Awake, alert, oriented to name, place and time.  Cranial nerves II-XII are grossly intact.  Motor is 5 out of 5 bilaterally.  Cerebellar finger to nose, heel to shin intact.  Sensory is intact.  Babinski down going, Romberg negative, and gait is normal.  Mental Status Exam:  Level of Alertness:   awake  Orientation:   X 3  Memory:   normal  Cranial Nerves:  cranial nerves II-XII are grossly intact            Data:        All laboratory and imaging data in the past 24 hours reviewed   All laboratory and imaging data in the past 24 hours reviewed     No results for input(s): \"WBC\", \"HGB\", \"HCT\", \"MCV\", \"PLT\" in the last 168 hours.  No results for input(s): \"CULT\" in the last 168 hours.  No results for input(s): \"NA\", \"POTASSIUM\", \"CHLORIDE\", \"CO2\", \"ANIONGAP\", \"GLC\", \"BUN\", \"CR\", \"GFRESTIMATED\", \"GFRESTBLACK\", \"RAI\", \"MAG\", \"PHOS\", \"PROTTOTAL\", \"ALBUMIN\", \"BILITOTAL\", \"ALKPHOS\", \"AST\", \"ALT\" in the last 168 hours.    No results for input(s): \"GLC\", \"BGM\" in the last 168 hours.        No results for input(s): \"INR\" in the last 168 hours.        No results for input(s): \"TROPONIN\", \"TROPI\", \"TROPR\" in the last 168 hours.    Invalid input(s): \"TROP\", \"TROPONINIES\"    Recent Results (from the past 48 hour(s))   XR Knee Port Right 1/2 Views    Narrative    XR " KNEE PORT RIGHT 1/2 VIEWS 5/21/2024 11:57 AM     HISTORY: Post-Op Total Knee    COMPARISON: None.         Impression    IMPRESSION: Postoperative changes right total knee arthroplasty and  patellar resurfacing. Components appear well seated. Postprocedural  air within the joint and surrounding soft tissues.    AL ROY MD         SYSTEM ID:  [id number]           Dominick Holley M.D.,M.B.A  [facility]

## 2024-05-22 NOTE — TELEPHONE ENCOUNTER
Daughter, Alek Stevens completed authorization for Joe.     Form was completed by Chente Waterman PA-C and faxed to [phone]. Confirmed it went through via Rightfax. Copy sent to scan, copy left at  for daughter to , and copy placed in folder.     [employer] message was sent to patient informing her of the above.     VANNESA Truong RN

## 2024-05-22 NOTE — PLAN OF CARE
Goal Outcome Evaluation:      Plan of Care Reviewed With: patient    Overall Patient Progress: improvingOverall Patient Progress: improving     Patient alert and oriented during shift. Drowsy at times. Good appetite. Pain treated with tylenol, Toradol and tylenol. Up to bathroom with walker and gait belt. Lactic acid 2.6- contacted MD- Bolus given. Patient complained of feeling dizzy when working with therapy. Orthostatic hypotension vitals completed. MD aware of results. CMS intact. Continuous O2 monitoring. Room air. Possible discharge home today with daughter, depending on therapy.     Problem: Adult Inpatient Plan of Care  Goal: Plan of Care Review  Outcome: Progressing  Flowsheets (Taken 5/22/2024 1347)  Plan of Care Reviewed With: patient  Overall Patient Progress: improving  Goal: Patient-Specific Goal (Individualized)  Outcome: Progressing  Goal: Absence of Hospital-Acquired Illness or Injury  Outcome: Progressing  Intervention: Identify and Manage Fall Risk  Recent Flowsheet Documentation  Taken 5/22/2024 1300 by Jennifer Ovalle RN  Safety Promotion/Fall Prevention:   activity supervised   assistive device/personal items within reach   nonskid shoes/slippers when out of bed   room near nurse's station   safety round/check completed   room organization consistent  Intervention: Prevent Skin Injury  Recent Flowsheet Documentation  Taken 5/22/2024 1300 by Jennifer Ovalle RN  Device Skin Pressure Protection: adhesive use limited  Intervention: Prevent and Manage VTE (Venous Thromboembolism) Risk  Recent Flowsheet Documentation  Taken 5/22/2024 1300 by Jennifer Ovalle RN  VTE Prevention/Management: SCDs (sequential compression devices) off  Intervention: Prevent Infection  Recent Flowsheet Documentation  Taken 5/22/2024 1300 by Jennifer Ovalle RN  Infection Prevention: hand hygiene promoted  Goal: Optimal Comfort and Wellbeing  Outcome: Progressing  Intervention: Monitor Pain and Promote Comfort  Recent  Flowsheet Documentation  Taken 5/22/2024 1255 by Jennifer Ovalle RN  Pain Management Interventions: medication (see MAR)  Taken 5/22/2024 1011 by Jennifer Ovalle RN  Pain Management Interventions: medication (see MAR)  Taken 5/22/2024 0748 by Jennifer Ovalle RN  Pain Management Interventions: medication (see MAR)  Goal: Readiness for Transition of Care  Outcome: Progressing     Problem: Pain Acute  Goal: Optimal Pain Control and Function  Outcome: Progressing  Intervention: Develop Pain Management Plan  Recent Flowsheet Documentation  Taken 5/22/2024 1255 by Jennifer Ovalle RN  Pain Management Interventions: medication (see MAR)  Taken 5/22/2024 1011 by Jennifer Ovalle RN  Pain Management Interventions: medication (see MAR)  Taken 5/22/2024 0748 by Jennifer Ovalle RN  Pain Management Interventions: medication (see MAR)  Intervention: Prevent or Manage Pain  Recent Flowsheet Documentation  Taken 5/22/2024 1300 by Jennifer Ovalle RN  Medication Review/Management: medications reviewed     Problem: Fall Injury Risk  Goal: Absence of Fall and Fall-Related Injury  Outcome: Progressing  Intervention: Identify and Manage Contributors  Recent Flowsheet Documentation  Taken 5/22/2024 1300 by Jennifer Ovalle RN  Medication Review/Management: medications reviewed  Intervention: Promote Injury-Free Environment  Recent Flowsheet Documentation  Taken 5/22/2024 1300 by Jennifer Ovalle RN  Safety Promotion/Fall Prevention:   activity supervised   assistive device/personal items within reach   nonskid shoes/slippers when out of bed   room near nurse's station   safety round/check completed   room organization consistent     Problem: Knee Arthroplasty  Goal: Optimal Coping  Outcome: Progressing  Goal: Absence of Bleeding  Outcome: Progressing  Intervention: Monitor and Manage Bleeding  Recent Flowsheet Documentation  Taken 5/22/2024 1300 by Jennifer Ovalle RN  Bleeding Management: dressing monitored  Goal: Effective Bowel  Elimination  Outcome: Progressing  Goal: Fluid and Electrolyte Balance  Outcome: Progressing  Goal: Optimal Functional Ability  Outcome: Progressing  Intervention: Promote Optimal Functional Status  Recent Flowsheet Documentation  Taken 5/22/2024 1300 by Jennifer Ovalle RN  Assistive Device Utilized:   walker   gait belt  Activity Management:   activity adjusted per tolerance   activity encouraged  Taken 5/22/2024 1145 by Jennifer Ovalle RN  Assistive Device Utilized:   walker   gait belt  Activity Management:   ambulated to bathroom   up in chair  Goal: Absence of Infection Signs and Symptoms  Outcome: Progressing  Goal: Intact Neurovascular Status  Outcome: Progressing  Goal: Anesthesia/Sedation Recovery  Outcome: Progressing  Intervention: Optimize Anesthesia Recovery  Recent Flowsheet Documentation  Taken 5/22/2024 1300 by Jennifer Ovalle RN  Safety Promotion/Fall Prevention:   activity supervised   assistive device/personal items within reach   nonskid shoes/slippers when out of bed   room near nurse's station   safety round/check completed   room organization consistent  Goal: Optimal Pain Control and Function  Outcome: Progressing  Intervention: Prevent or Manage Pain  Recent Flowsheet Documentation  Taken 5/22/2024 1255 by Jennifer Ovalle RN  Pain Management Interventions: medication (see MAR)  Taken 5/22/2024 1011 by Jennifer Ovalle RN  Pain Management Interventions: medication (see MAR)  Taken 5/22/2024 0748 by Jennifer Ovalle RN  Pain Management Interventions: medication (see MAR)  Goal: Nausea and Vomiting Relief  Outcome: Progressing  Goal: Effective Urinary Elimination  Outcome: Progressing  Goal: Effective Oxygenation and Ventilation  Outcome: Progressing     Problem: Skin Injury Risk Increased  Goal: Skin Health and Integrity  Outcome: Progressing  Intervention: Plan: Nurse Driven Intervention: Moisture Management  Recent Flowsheet Documentation  Taken 5/22/2024 1300 by Jennifer Ovalle  RN  Moisture Interventions:   Encourage regular toileting   Incontinence pad   No brief in bed  Intervention: Optimize Skin Protection  Recent Flowsheet Documentation  Taken 5/22/2024 1300 by Jennifer Ovalle, RN  Activity Management:   activity adjusted per tolerance   activity encouraged  Taken 5/22/2024 1145 by Jennifer Ovalle, RN  Activity Management:   ambulated to bathroom   up in chair

## 2024-05-22 NOTE — PLAN OF CARE
Physical Therapy Discharge Summary    Reason for therapy discharge:    All goals and outcomes met, no further needs identified.    Progress towards therapy goal(s). See goals on Care Plan in [facility] electronic health record for goal details.  Goals met    Therapy recommendation(s):    Defer to ortho

## 2024-05-23 ENCOUNTER — TELEPHONE (OUTPATIENT)
Dept: ORTHOPEDICS | Facility: CLINIC | Age: 59
End: 2024-05-23

## 2024-05-23 NOTE — TELEPHONE ENCOUNTER
Form received via fax from [employer].     Once completed please fax back to [phone].     Kristin Pemberton ATC

## 2024-05-24 ENCOUNTER — TELEPHONE (OUTPATIENT)
Dept: INTERNAL MEDICINE | Facility: CLINIC | Age: 59
End: 2024-05-24
Payer: COMMERCIAL

## 2024-05-24 ENCOUNTER — MYC MEDICAL ADVICE (OUTPATIENT)
Dept: ORTHOPEDICS | Facility: CLINIC | Age: 59
End: 2024-05-24
Payer: COMMERCIAL

## 2024-05-24 ENCOUNTER — TELEPHONE (OUTPATIENT)
Dept: ORTHOPEDICS | Facility: CLINIC | Age: 59
End: 2024-05-24
Payer: COMMERCIAL

## 2024-05-24 DIAGNOSIS — M17.11 PRIMARY OSTEOARTHRITIS OF RIGHT KNEE: Primary | ICD-10-CM

## 2024-05-24 RX ORDER — METHOCARBAMOL 750 MG/1
750 TABLET, FILM COATED ORAL EVERY 8 HOURS PRN
Qty: 30 TABLET | Refills: 0 | Status: SHIPPED | OUTPATIENT
Start: 2024-05-24 | End: 2024-07-10

## 2024-05-24 NOTE — TELEPHONE ENCOUNTER
Forms/Letter Request    Type of form/letter: OTHER: Health Care Provider Report     Do we have the form/letter: Yes: mailed in     Who is the form from? [employer] (if other please explain)    Where did/will the form come from? form was mailed in    When is form/letter needed by: asap    How would you like the form/letter returned: Mail, stamped envelope attached

## 2024-05-24 NOTE — TELEPHONE ENCOUNTER
[facility] Call Center    Phone Message    May a detailed message be left on voicemail: yes     Reason for Call: Other: Elisa calling from [employer]. She needs PT orders. One more visit this week and then 3 times a week for one week and then two times a week for one week. They need a home health aid one time a week for 2 weeks.       Action Taken: Other: te    Travel Screening: Not Applicable

## 2024-05-26 LAB
BACTERIA BLD CULT: NO GROWTH
BACTERIA BLD CULT: NO GROWTH

## 2024-05-28 ENCOUNTER — MEDICAL CORRESPONDENCE (OUTPATIENT)
Dept: HEALTH INFORMATION MANAGEMENT | Facility: CLINIC | Age: 59
End: 2024-05-28

## 2024-05-28 NOTE — TELEPHONE ENCOUNTER
Phoned SAMANTHA Pérez back from [facility].    Provided VO for the home care request as stated below.  Understanding expressed.    Ptati Li RN on 5/28/2024 at 9:07 AM

## 2024-05-29 ENCOUNTER — VIRTUAL VISIT (OUTPATIENT)
Dept: INTERNAL MEDICINE | Facility: CLINIC | Age: 59
End: 2024-05-29
Payer: COMMERCIAL
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
## 2024-05-29 PROCEDURE — 99442 PR PHYSICIAN TELEPHONE EVALUATION 11-20 MIN: CPT | Mod: 24 | Performed by: INTERNAL MEDICINE

## 2024-05-29 NOTE — TELEPHONE ENCOUNTER
Please see [employer] message regarding [employer] forms that need to be corrected.     Phone call to patient. She states the plan was for her to go back to work on 7/2/24 full time, not 6/2/24. She states she was under the influence of anesthesia when she spoke with the provider about dates.   Simone called her this am and asks that she get the forms corrected as soon as possible. We can use the existing form and cross out and date and initial any changes.   She does not think anything will need to be changed for her daughter's forms.   Will discuss with Chente Waterman PA-C and get back with her via [employer].     Patient reports that her daughter has had to stay with her longer because she is not able to get out of a chair or bed by herself. She is working with home health physical therapy to assist her with improving this. They are coming again on Friday to help with getting down stairs. Right now she is not able to use the stairs either. Her daughter is planning on going back to work on 6/3/24.     Please advise if forms can be updated to RTW on 7/2/24 full time instead of 4 hrs/day. Copy of forms are in the brown accordion folder.     VANNESA Truong RN

## 2024-05-29 NOTE — PROGRESS NOTES
Jimmy is a 58 year old who is being evaluated via a billable telephone visit.    What phone number would you like to be contacted at? [phone]  How would you like to obtain your AVS? Cj  Originating Location (pt. Location): Home    Distant Location (provider location):  On-site    Assessment & Plan     Fall, subsequent encounter  Forms filled out on patient's behalf regarding the fall with no notable restrictions.        See Patient Instructions    Subjective   Jimmy is a 58 year old, presenting for the following health issues:  Forms (Work injury status )    History of Present Illness       Reason for visit:  Workers' comp follow up status forms. Fall 12/12/23    She eats 2-3 servings of fruits and vegetables daily.She consumes 0 sweetened beverage(s) daily.She exercises with enough effort to increase her heart rate 9 or less minutes per day.  She exercises with enough effort to increase her heart rate 5 days per week.   She is taking medications regularly.    Primarily contacted patient due to the fact that she has had some issues ongoing related to an apparent fall that occurred in December at her work environment.  She had [employer] or send me forms that I needed to complete on her behalf regarding her ongoing issues.  She reports to me that she did not fall in relationship to any work induced environmental change or work process.  She reports at present time there is no ongoing impact of her fall and that she has no current physical restrictions.  She has just undergone a total knee replacement per orthopedics but this is independent of issues related to her fall.    Review of Systems  CONSTITUTIONAL: NEGATIVE for fever, chills, change in weight  EYES: NEGATIVE for vision changes or irritation  ENT/MOUTH: NEGATIVE for ear, mouth and throat problems  RESP: NEGATIVE for significant cough or SOB  CV: NEGATIVE for chest pain, palpitations or peripheral edema  GI: NEGATIVE for nausea, abdominal  pain, heartburn, or change in bowel habits  : NEGATIVE for frequency, dysuria, or hematuria  NEURO: NEGATIVE for weakness, dizziness or paresthesias  ENDOCRINE: NEGATIVE for temperature intolerance, skin/hair changes  HEME: NEGATIVE for bleeding problems  PSYCHIATRIC: NEGATIVE for changes in mood or affect      Objective           Vitals:  No vitals were obtained today due to virtual visit.    Physical Exam   General: Alert and no distress //Respiratory: No audible wheeze, cough, or shortness of breath // Psychiatric:  Appropriate affect, tone, and pace of words            Phone call duration: 13 minutes  Signed Electronically by: Nick Calderon MD

## 2024-05-30 ENCOUNTER — TELEPHONE (OUTPATIENT)
Dept: ORTHOPEDICS | Facility: CLINIC | Age: 59
End: 2024-05-30
Payer: COMMERCIAL

## 2024-05-30 NOTE — TELEPHONE ENCOUNTER
Fax received from [facility] requesting provider complete Documentation of Face-to-Face Encounter form.     Form given to provider at  office for review/ completion.     Once completed, clinical staff to fax form and 5/22 clinical note to [facility] at fax #: [phone].     Kristin Pemberton ATC

## 2024-05-30 NOTE — TELEPHONE ENCOUNTER
Document completed by provider and faxed to [facility] before being sent to scan.     Kristin Pemberton MSA, ATC  Certified Athletic Trainer

## 2024-06-03 NOTE — PROGRESS NOTES
"HISTORY OF PRESENT ILLNESS:    Jimmy Stevens is a 58 year old female who is seen in follow up for right TKA 5/21/24 with Dr Rosales.  Presents with daughter.  Present symptoms: Pt reports overall she is doing well. She does note that the knee \"feels funny\" and notes some swelling in right lower leg.  Treatments include Ibuprofen 600mg - provides a little relief, Robaxin, Tylenol PRN, home care PT.  Using walker for ambulation.  Overall very cautious.  No new complaints.  Denies Chest pain, Calve pain, Fever, Chills.    PHYSICAL EXAM:  LMP  (LMP Unknown)   There is no height or weight on file to calculate BMI.   GENERAL APPEARANCE: healthy, alert, and no distress   PSYCH:  mentation appears normal and affect normal/bright    MSK:  Right:  Knee.  Ambulates: slowly with walker, near full extension..  Incision clean and dry, subcut closure with tails under aquacel present, healing.  Appropriate incisional erythema.   Yes Ecchymosis very mild resolving..  No calve pain on palpation.  Edema moderate at knee to ankle.  CMS: shan incisional numbness, otherwise grossly intact.  AROM Flexion 5-85.    IMAGING INTERPRETATION:  None today.     ASSESSMENT:  Jimmy Stevens is a 58 year old female S/P R TKA..    Cautious and painful but progressing.    Had a discussion to not be afraid to use the knee, cannot damage knee using it under her own power.    PLAN:  - Surgery discussed, images reviewed if applicable, and all questions were answered at this time.  - suture tails removed with sterile technique, steri-strips applied in usual fashion, care instructions given and verbally acknowledged.  - Medications: OTC PRN.  - Physical Therapy: continue with current physical therapy at home and transition to OP PT when leaving house is reasonable.  - AAT.    Return to clinic6 weeks for x ray.    Miko Waterman PA-C    Dept. Orthopedic Surgery  [facility]   6/3/2024     "

## 2024-06-06 ENCOUNTER — OFFICE VISIT (OUTPATIENT)
Dept: ORTHOPEDICS | Facility: CLINIC | Age: 59
End: 2024-06-06
Payer: COMMERCIAL

## 2024-06-06 DIAGNOSIS — Z47.89 ORTHOPEDIC AFTERCARE: Primary | ICD-10-CM

## 2024-06-06 PROCEDURE — 99024 POSTOP FOLLOW-UP VISIT: CPT | Performed by: PHYSICIAN ASSISTANT

## 2024-06-06 ASSESSMENT — KOOS JR
STANDING UPRIGHT: MILD
KOOS JR SCORING: 65.99
TWISING OR PIVOTING ON KNEE: MILD
HOW SEVERE IS YOUR KNEE STIFFNESS AFTER FIRST WAKING IN MORNING: MILD
GOING UP OR DOWN STAIRS: MILD
RISING FROM SITTING: MILD
STRAIGHTENING KNEE FULLY: MODERATE
BENDING TO THE FLOOR TO PICK UP OBJECT: MILD

## 2024-06-06 NOTE — Clinical Note
"6/6/2024      Jimmy Stevens  [address]  [place] [address]      Dear Colleague,    Thank you for referring your patient, Jimmy Stevens, to the [facility] [place]. Please see a copy of my visit note below.    HISTORY OF PRESENT ILLNESS:    Jimmy Stevens is a 58 year old female who is seen in follow up for right TKA 5/21/24 with Dr Rosales.  Present symptoms: Pt reports overall she is doing well. She does note that the knee \"feels funny\" and notes some swelling in right lower leg.  Treatments include Ibuprofen 600mg - provides a little relief, Robaxin, Tylenol PRN.  Using walker for ambulation.  ***.  No new complaints.  Denies Chest pain, Calve pain, Fever, Chills.    PHYSICAL EXAM:  LMP  (LMP Unknown)   There is no height or weight on file to calculate BMI.   GENERAL APPEARANCE: {ROMÁN GENERAL APPEARANCE:50::\"healthy\",\"alert\",\"no distress\"}   PSYCH:  {ROMÁN EXAM CPE - PSYCH:163639::\"mentation appears normal\",\"affect normal/bright\"}    MSK:  {RIGHT:827488} Knee.  Ambulates: ***.  Incision clean and dry, *** present, healing.  Appropriate incisional erythema.   {YES/NO -default:397813::\"No\"} Ecchymosis ***.  {YES/NO -default:755758::\"No\"} calve pain on palpation.  Edema *** at knee ***.  CMS: shan incisional numbness, otherwise grossly intact.  AROM Flexion ***.    IMAGING INTERPRETATION:  None today.     ASSESSMENT:  Jimmy Stevens is a 58 year old female S/P ***.  ***    PLAN:  - Surgery discussed, images reviewed if applicable, and all questions were answered at this time.  - *** removed with sterile technique, steri-strips applied in usual fashion, care instructions given and verbally acknowledged.  - Medications: ***  - Physical Therapy: {REHAB :554020}  - AAT.    Return to clinic PRN    Miko Waterman PA-C    Dept. Orthopedic Surgery  [facility]   6/3/2024       HISTORY OF PRESENT ILLNESS:    Jimmy Stevens is a 58 year old female " "who is seen in follow up for right TKA 5/21/24 with Dr Rosales.  Presents with daughter.  Present symptoms: Pt reports overall she is doing well. She does note that the knee \"feels funny\" and notes some swelling in right lower leg.  Treatments include Ibuprofen 600mg - provides a little relief, Robaxin, Tylenol PRN, home care PT.  Using walker for ambulation.  Overall very cautious.  No new complaints.  Denies Chest pain, Calve pain, Fever, Chills.    PHYSICAL EXAM:  LMP  (LMP Unknown)   There is no height or weight on file to calculate BMI.   GENERAL APPEARANCE: healthy, alert, and no distress   PSYCH:  mentation appears normal and affect normal/bright    MSK:  Right:  Knee.  Ambulates: slowly with walker, near full extension..  Incision clean and dry, subcut closure with tails under aquacel present, healing.  Appropriate incisional erythema.   Yes Ecchymosis very mild resolving..  No calve pain on palpation.  Edema moderate at knee to ankle.  CMS: sahn incisional numbness, otherwise grossly intact.  AROM Flexion 5-85.    IMAGING INTERPRETATION:  None today.     ASSESSMENT:  Jimmy Stevens is a 58 year old female S/P R TKA..    Cautious and painful but progressing.    Had a discussion to not be afraid to use the knee, cannot damage knee using it under her own power.    PLAN:  - Surgery discussed, images reviewed if applicable, and all questions were answered at this time.  - suture tails removed with sterile technique, steri-strips applied in usual fashion, care instructions given and verbally acknowledged.  - Medications: OTC PRN.  - Physical Therapy: continue with current physical therapy at home and transition to OP PT when leaving house is reasonable.  - AAT.    Return to clinic6 weeks for x ray.    Miko Waterman PA-C    Dept. Orthopedic Surgery  [facility]   6/3/2024         Again, thank you for allowing me to participate in the care of your patient.  "       Sincerely,        Miko Waterman PA-C

## 2024-06-06 NOTE — TELEPHONE ENCOUNTER
Form was completed. Form reviewed with patient at office visit today 6/6. Copy provided to patient. Copy sent to scan. Copy placed in accordion folder. Form faxed to [employer] at number listed below.    Emily Wade ATC

## 2024-06-06 NOTE — PATIENT INSTRUCTIONS
You may shower per normal.  Do not submerge incision until scabs are gone.  Tape strips should fall off on their own.    It is recommended that you delay any invasive procedures such as dental work, colonoscopy or other surgeries for 6 months after surgery unless it is an emergency.  Please notify the provider if an emergency occurs.    It is also recommended that all total joint patients should take antibiotics prior to dental visits for 1 year following surgery.  After the first year antibiotics are recommended for all high risk patients such as but not limited to:  previous joint infection, diabetes, immunosuppressive medications or being immunocompromised, multiple surgeries on your total joint, smoking, prior radiation to the joint area.    You may discontinue the aspirin or return to a dose recommended by your Primary care provider 4 weeks after surgery.    You may increase your activities as you can tolerate them.  It is recommended that you do not do prolonged kneeling.    It is also recommended that you continue your exercises on your own for several additional months to avoid regressing.    Also discussion that you do not need to be overly concerned with imaging of the knee while doing activities under your own power, I.e. unless you have a fall or trip there is nothing to damage your knee otherwise, doing exercises or walking.    Please follow up in 4-6 weeks as previously scheduled for an X ray of your knee.

## 2024-06-07 ENCOUNTER — MEDICAL CORRESPONDENCE (OUTPATIENT)
Dept: HEALTH INFORMATION MANAGEMENT | Facility: CLINIC | Age: 59
End: 2024-06-07
Payer: COMMERCIAL

## 2024-06-07 NOTE — TELEPHONE ENCOUNTER
Left voicemail for patient to return call to discuss paperwork. MyChart message also sent.     Kristin Pemberton ATC

## 2024-06-08 ENCOUNTER — MEDICAL CORRESPONDENCE (OUTPATIENT)
Dept: HEALTH INFORMATION MANAGEMENT | Facility: CLINIC | Age: 59
End: 2024-06-08
Payer: COMMERCIAL

## 2024-06-11 NOTE — TELEPHONE ENCOUNTER
Patient read the previous [employer] message on 6/7/24.     2nd [employer] message sent to patient.     VANNESA Truong RN

## 2024-06-26 ASSESSMENT — ACTIVITIES OF DAILY LIVING (ADL)
STAND: ACTIVITY IS MINIMALLY DIFFICULT
PAIN: THE SYMPTOM AFFECTS MY ACTIVITY SLIGHTLY
WALK: ACTIVITY IS MINIMALLY DIFFICULT
AS_A_RESULT_OF_YOUR_KNEE_INJURY,_HOW_WOULD_YOU_RATE_YOUR_CURRENT_LEVEL_OF_DAILY_ACTIVITY?: NEARLY NORMAL
STAND: ACTIVITY IS MINIMALLY DIFFICULT
GIVING WAY, BUCKLING OR SHIFTING OF KNEE: THE SYMPTOM AFFECTS MY ACTIVITY SLIGHTLY
PAIN: THE SYMPTOM AFFECTS MY ACTIVITY SLIGHTLY
RAW_SCORE: 44
HOW_WOULD_YOU_RATE_THE_OVERALL_FUNCTION_OF_YOUR_KNEE_DURING_YOUR_USUAL_DAILY_ACTIVITIES?: NEARLY NORMAL
HOW_WOULD_YOU_RATE_THE_CURRENT_FUNCTION_OF_YOUR_KNEE_DURING_YOUR_USUAL_DAILY_ACTIVITIES_ON_A_SCALE_FROM_0_TO_100_WITH_100_BEING_YOUR_LEVEL_OF_KNEE_FUNCTION_PRIOR_TO_YOUR_INJURY_AND_0_BEING_THE_INABILITY_TO_PERFORM_ANY_OF_YOUR_USUAL_DAILY_ACTIVITIES?: 70
WEAKNESS: THE SYMPTOM AFFECTS MY ACTIVITY SLIGHTLY
STIFFNESS: THE SYMPTOM AFFECTS MY ACTIVITY SLIGHTLY
GO UP STAIRS: ACTIVITY IS MINIMALLY DIFFICULT
KNEE_ACTIVITY_OF_DAILY_LIVING_SUM: 44
KNEEL ON THE FRONT OF YOUR KNEE: I AM UNABLE TO DO THE ACTIVITY
GO DOWN STAIRS: ACTIVITY IS MINIMALLY DIFFICULT
SIT WITH YOUR KNEE BENT: I AM UNABLE TO DO THE ACTIVITY
WEAKNESS: THE SYMPTOM AFFECTS MY ACTIVITY SLIGHTLY
WALK: ACTIVITY IS MINIMALLY DIFFICULT
SQUAT: ACTIVITY IS MINIMALLY DIFFICULT
GIVING WAY, BUCKLING OR SHIFTING OF KNEE: THE SYMPTOM AFFECTS MY ACTIVITY SLIGHTLY
SWELLING: I DO NOT HAVE THE SYMPTOM
STIFFNESS: THE SYMPTOM AFFECTS MY ACTIVITY SLIGHTLY
HOW_WOULD_YOU_RATE_THE_CURRENT_FUNCTION_OF_YOUR_KNEE_DURING_YOUR_USUAL_DAILY_ACTIVITIES_ON_A_SCALE_FROM_0_TO_100_WITH_100_BEING_YOUR_LEVEL_OF_KNEE_FUNCTION_PRIOR_TO_YOUR_INJURY_AND_0_BEING_THE_INABILITY_TO_PERFORM_ANY_OF_YOUR_USUAL_DAILY_ACTIVITIES?: 70
LIMPING: THE SYMPTOM AFFECTS MY ACTIVITY SLIGHTLY
GO UP STAIRS: ACTIVITY IS MINIMALLY DIFFICULT
KNEE_ACTIVITY_OF_DAILY_LIVING_SCORE: 62.86
PLEASE_INDICATE_YOR_PRIMARY_REASON_FOR_REFERRAL_TO_THERAPY:: KNEE
AS_A_RESULT_OF_YOUR_KNEE_INJURY,_HOW_WOULD_YOU_RATE_YOUR_CURRENT_LEVEL_OF_DAILY_ACTIVITY?: NEARLY NORMAL
RISE FROM A CHAIR: ACTIVITY IS MINIMALLY DIFFICULT
KNEEL ON THE FRONT OF YOUR KNEE: I AM UNABLE TO DO THE ACTIVITY
SWELLING: I DO NOT HAVE THE SYMPTOM
SIT WITH YOUR KNEE BENT: I AM UNABLE TO DO THE ACTIVITY
RISE FROM A CHAIR: ACTIVITY IS MINIMALLY DIFFICULT
HOW_WOULD_YOU_RATE_THE_OVERALL_FUNCTION_OF_YOUR_KNEE_DURING_YOUR_USUAL_DAILY_ACTIVITIES?: NEARLY NORMAL
GO DOWN STAIRS: ACTIVITY IS MINIMALLY DIFFICULT
SQUAT: ACTIVITY IS MINIMALLY DIFFICULT
LIMPING: THE SYMPTOM AFFECTS MY ACTIVITY SLIGHTLY

## 2024-06-27 ENCOUNTER — THERAPY VISIT (OUTPATIENT)
Dept: PHYSICAL THERAPY | Facility: CLINIC | Age: 59
End: 2024-06-27
Attending: STUDENT IN AN ORGANIZED HEALTH CARE EDUCATION/TRAINING PROGRAM
Payer: COMMERCIAL

## 2024-06-27 DIAGNOSIS — Z96.641 S/P TOTAL RIGHT HIP ARTHROPLASTY: ICD-10-CM

## 2024-06-27 DIAGNOSIS — Z96.651 AFTERCARE FOLLOWING RIGHT KNEE JOINT REPLACEMENT SURGERY: Primary | ICD-10-CM

## 2024-06-27 DIAGNOSIS — Z47.1 AFTERCARE FOLLOWING RIGHT KNEE JOINT REPLACEMENT SURGERY: Primary | ICD-10-CM

## 2024-06-27 DIAGNOSIS — M17.11 OSTEOARTHRITIS OF RIGHT KNEE: ICD-10-CM

## 2024-06-27 PROCEDURE — 97110 THERAPEUTIC EXERCISES: CPT | Mod: GP | Performed by: PHYSICAL THERAPIST

## 2024-06-27 PROCEDURE — 97161 PT EVAL LOW COMPLEX 20 MIN: CPT | Mod: GP | Performed by: PHYSICAL THERAPIST

## 2024-06-27 NOTE — PROGRESS NOTES
PHYSICAL THERAPY EVALUATION  Type of Visit: Evaluation       Fall Risk Screen:  Fall screen completed by: PT  Have you fallen 2 or more times in the past year?: Yes  Have you fallen and had an injury in the past year?: No  Timed Up and Go score (seconds): 16  Is patient a fall risk?: Yes; Department fall risk interventions implemented    Subjective  Pt. complains of right knee pain that has been present right TKA on 5/21/24.  Patient s chief complaints: Right knee pain and weakness.  Symptoms are exacerbated by walking, standing and stairs.  Symptoms are relieved by rest, elevation and ice.   Current function restrictions: Walking, standing and going up and down stairs patient lives with her daughter in an apartment complex that requires her to go up and down stairs.  Previous functional status as reported by patient: Unlimited prior to degenerative changes in the knee.  Patient had home therapy prior to coming to the clinic for outpatient therapy.        Presenting condition or subjective complaint: Total knee surgery therapy continued from [facility]  Date of onset:      Relevant medical history:     Dates & types of surgery:      Prior diagnostic imaging/testing results: MRI; X-ray     Prior therapy history for the same diagnosis, illness or injury: Yes Right after surgery therapy started on the 23 May    Prior Level of Function  Transfers: Independent  Ambulation: Independent  ADL: Independent  IADL:     Living Environment  Social support: With family members   Type of home: Apartment/condo; Multi-level   Stairs to enter the home: Yes 32 Is there a railing: Yes     Ramp: No   Stairs inside the home: No       Help at home: Self Cares (home health aide/personal care attendant, family, etc); Home management tasks (cooking, cleaning); Assist for driving and community activities  Equipment owned: Straight Cane; Walker; Raised toilet seat; Bath bench     Employment: [employer]  Hobbies/Interests:       Patient goals for therapy: To get better    Pain assessment: Pain present 4/10, 7/10 at worst     Objective   Right knee passive range of motion: 0-1-96  Fair quad set  Extensor lag evident with straight leg raise  Incision healing well  Minimal swelling evident  Patient ambulating with single ended cane    Assessment & Plan   CLINICAL IMPRESSIONS  Medical Diagnosis:      Treatment Diagnosis:     Impression/Assessment: Patient is a 58 year old female with right knee complaints.  The following significant findings have been identified: Pain, Decreased ROM/flexibility, Decreased joint mobility, Decreased strength, Impaired balance, Decreased proprioception, Impaired gait, Impaired muscle performance, and Decreased activity tolerance. These impairments interfere with their ability to perform self care tasks, recreational activities, household chores, driving , household mobility, and community mobility as compared to previous level of function.     Clinical Decision Making (Complexity):  Clinical Presentation: Stable/Uncomplicated  Clinical Presentation Rationale: based on medical and personal factors listed in PT evaluation  Clinical Decision Making (Complexity): Low complexity    PLAN OF CARE  Treatment Interventions:  Modalities: Cryotherapy  Interventions: Gait Training, Manual Therapy, Neuromuscular Re-education, Therapeutic Activity, Therapeutic Exercise    Long Term Goals            Frequency of Treatment:    Duration of Treatment:      Recommended Referrals to Other Professionals:   Education Assessment:        Risks and benefits of evaluation/treatment have been explained.   Patient/Family/caregiver agrees with Plan of Care.     Evaluation Time:             Signing Clinician: Abner Doan, PT

## 2024-07-02 ENCOUNTER — THERAPY VISIT (OUTPATIENT)
Dept: PHYSICAL THERAPY | Facility: CLINIC | Age: 59
End: 2024-07-02
Payer: COMMERCIAL

## 2024-07-02 DIAGNOSIS — Z96.651 AFTERCARE FOLLOWING RIGHT KNEE JOINT REPLACEMENT SURGERY: ICD-10-CM

## 2024-07-02 DIAGNOSIS — Z47.1 AFTERCARE FOLLOWING RIGHT KNEE JOINT REPLACEMENT SURGERY: ICD-10-CM

## 2024-07-02 DIAGNOSIS — M25.561 ACUTE PAIN OF RIGHT KNEE: Primary | ICD-10-CM

## 2024-07-02 PROCEDURE — 97116 GAIT TRAINING THERAPY: CPT | Mod: GP

## 2024-07-02 PROCEDURE — 97110 THERAPEUTIC EXERCISES: CPT | Mod: GP

## 2024-07-02 PROCEDURE — 97535 SELF CARE MNGMENT TRAINING: CPT | Mod: GP

## 2024-07-08 ENCOUNTER — THERAPY VISIT (OUTPATIENT)
Dept: PHYSICAL THERAPY | Facility: CLINIC | Age: 59
End: 2024-07-08
Attending: STUDENT IN AN ORGANIZED HEALTH CARE EDUCATION/TRAINING PROGRAM
Payer: COMMERCIAL

## 2024-07-08 DIAGNOSIS — M25.561 ACUTE PAIN OF RIGHT KNEE: Primary | ICD-10-CM

## 2024-07-08 DIAGNOSIS — Z47.1 AFTERCARE FOLLOWING RIGHT KNEE JOINT REPLACEMENT SURGERY: ICD-10-CM

## 2024-07-08 DIAGNOSIS — Z96.651 AFTERCARE FOLLOWING RIGHT KNEE JOINT REPLACEMENT SURGERY: ICD-10-CM

## 2024-07-08 PROCEDURE — 97110 THERAPEUTIC EXERCISES: CPT | Mod: GP | Performed by: PHYSICAL THERAPIST

## 2024-07-09 ASSESSMENT — KOOS JR
HOW SEVERE IS YOUR KNEE STIFFNESS AFTER FIRST WAKING IN MORNING: MILD
TWISING OR PIVOTING ON KNEE: MILD
GOING UP OR DOWN STAIRS: MILD
KOOS JR SCORING: 70.7
BENDING TO THE FLOOR TO PICK UP OBJECT: MILD
STRAIGHTENING KNEE FULLY: MILD
RISING FROM SITTING: MILD

## 2024-07-10 ENCOUNTER — MYC REFILL (OUTPATIENT)
Dept: INTERNAL MEDICINE | Facility: CLINIC | Age: 59
End: 2024-07-10

## 2024-07-10 ENCOUNTER — THERAPY VISIT (OUTPATIENT)
Dept: PHYSICAL THERAPY | Facility: CLINIC | Age: 59
End: 2024-07-10
Payer: COMMERCIAL

## 2024-07-10 ENCOUNTER — MYC REFILL (OUTPATIENT)
Dept: ORTHOPEDICS | Facility: CLINIC | Age: 59
End: 2024-07-10

## 2024-07-10 ENCOUNTER — TELEPHONE (OUTPATIENT)
Dept: PHYSICAL THERAPY | Facility: CLINIC | Age: 59
End: 2024-07-10

## 2024-07-10 DIAGNOSIS — I10 ESSENTIAL HYPERTENSION, BENIGN: ICD-10-CM

## 2024-07-10 DIAGNOSIS — M25.561 ACUTE PAIN OF RIGHT KNEE: Primary | ICD-10-CM

## 2024-07-10 DIAGNOSIS — M17.11 PRIMARY OSTEOARTHRITIS OF RIGHT KNEE: ICD-10-CM

## 2024-07-10 DIAGNOSIS — Z47.1 AFTERCARE FOLLOWING RIGHT KNEE JOINT REPLACEMENT SURGERY: ICD-10-CM

## 2024-07-10 DIAGNOSIS — Z96.651 AFTERCARE FOLLOWING RIGHT KNEE JOINT REPLACEMENT SURGERY: ICD-10-CM

## 2024-07-10 PROCEDURE — 97110 THERAPEUTIC EXERCISES: CPT | Mod: GP

## 2024-07-10 RX ORDER — AMLODIPINE BESYLATE 5 MG/1
5 TABLET ORAL DAILY
OUTPATIENT
Start: 2024-07-10

## 2024-07-10 RX ORDER — LISINOPRIL 20 MG/1
TABLET ORAL
OUTPATIENT
Start: 2024-07-10

## 2024-07-10 RX ORDER — GABAPENTIN 300 MG/1
600 CAPSULE ORAL 3 TIMES DAILY
OUTPATIENT
Start: 2024-07-10

## 2024-07-10 NOTE — TELEPHONE ENCOUNTER
Called pt regarding pt's extreme fatigue during today's session (pt verbalized the OK to contact during the session). Provided edu on pt's condition during session with fatigue and about dark spots on knee and LE. Daughter reports that pt had reached out to providers about it and they were not concerned. Edu on things to watch for (extreme heat, discoloration, swelling, extreme pain into the calf, SOB, dizziness, lightheadedness, confusion, etc...) where patient would likely need to go to urgent care/ED. Daughter understood and will keep an eye on patient over next couple of days before follow-up visit.    Dewayne Fu called and updated on findings and poc to discharge back to facility     Suzanne Abdul RN  12/31/22 0006

## 2024-07-10 NOTE — TELEPHONE ENCOUNTER
Medication Request for: methocarbamol 750mg            Prescription last written on 5/24/24 by Dr. Rosales   Sig: take 1 tab every 8 hrs prn   Last Fill Quantity: 30 with # refills: 0     Last Office Visit by provider: 6/6/24 Chente Waterman PA-C   Date of Surgery (if applicable): 5/21/24  Procedure Performed (if applicable): R TKA Dr. Rosales   Future Office Visit:   7/12/24 Willam Chowdhury PA-C     Please advise regarding refill request.     VANNESA Truong RN

## 2024-07-10 NOTE — TELEPHONE ENCOUNTER
Called and spoke with pt.   She states her neurologist was refilling this, however she does not see this provider anymore.  She states she has an appt with a specialty provider tomorrow who can potentially refill this for her.   She will contact the clinic if anything further is needed in the future.     Thank you,  Marisa Chavez RN

## 2024-07-10 NOTE — TELEPHONE ENCOUNTER
Unclear on prescription refill request as medication does not appear to be prescribed by me and it appears that per PDMP has not been filled for quite some time.  Who is prescribing physician?  Prescription refill should go to the ordering provider

## 2024-07-11 RX ORDER — METHOCARBAMOL 750 MG/1
750 TABLET, FILM COATED ORAL EVERY 8 HOURS PRN
Qty: 30 TABLET | Refills: 0 | Status: SHIPPED | OUTPATIENT
Start: 2024-07-11

## 2024-07-12 ENCOUNTER — ANCILLARY PROCEDURE (OUTPATIENT)
Dept: GENERAL RADIOLOGY | Facility: CLINIC | Age: 59
End: 2024-07-12
Attending: PHYSICIAN ASSISTANT
Payer: COMMERCIAL

## 2024-07-12 ENCOUNTER — OFFICE VISIT (OUTPATIENT)
Dept: ORTHOPEDICS | Facility: CLINIC | Age: 59
End: 2024-07-12
Payer: COMMERCIAL

## 2024-07-12 VITALS
SYSTOLIC BLOOD PRESSURE: 135 MMHG | DIASTOLIC BLOOD PRESSURE: 80 MMHG | HEIGHT: 64 IN | BODY MASS INDEX: 34.49 KG/M2 | WEIGHT: 202 LBS

## 2024-07-12 DIAGNOSIS — Z47.89 ORTHOPEDIC AFTERCARE: Primary | ICD-10-CM

## 2024-07-12 DIAGNOSIS — Z47.89 ORTHOPEDIC AFTERCARE: ICD-10-CM

## 2024-07-12 PROCEDURE — 99024 POSTOP FOLLOW-UP VISIT: CPT | Performed by: PHYSICIAN ASSISTANT

## 2024-07-12 PROCEDURE — 73562 X-RAY EXAM OF KNEE 3: CPT | Mod: TC | Performed by: RADIOLOGY

## 2024-07-12 NOTE — LETTER
7/12/2024      Jimmy Stevens  [address]  [place] [place] [address]      Dear Colleague,    Thank you for referring your patient, Jimmy Stevens, to the [facility] [place]. Please see a copy of my visit note below.        [facility]  Orthopaedic Surgery Consultation by Juan Rosales M.D.    Jimmy Stevens MRN# [id number]   Age: 58 year old YOB: 1965     Requesting physician: Nick Landrum     Background history:  DX:  Hyperlipidemia  Hypertension  Neuropathy  Chronic low back pain  Chronic right knee pain  Morbid obesity     TREATMENTS:  9/27/23, right kne CSI, Dr. Mae  5/21/2024, right TKA, [place]           History of Present Illness:     58-year-old female presents today approximately 6 weeks status post right total knee arthroplasty.  Patient was last seen on 6/06/24 with Chente Waterman. Patient reports that therapy is going well but is feeling strenuous. She reports that her ankles will get swollen after being up and walking or doing therapy but will go down after she sits and elevates. She is noticing some bruising around the shin that seems to be getting bigger. She reports her leg is getting stiff. She walks without any assistive devices but is slow moving and stiff while walking. She reports some numbness over the area of bruising and swelling around the calf and shin.  Her daughter and physical therapist are concerned about the amount of fatigue she is having and she states she has not been sleeping well at night.      Current symptoms:  Problem: right knee pain  Onset and duration: began after a fall in 2016  Awakens from sleep due to sx's:  Yes  Precipitating Injury:  Yes    Other joints or sites painful:  Yes    Social:   Occupation: [employer]   Living situation: lives with her daughter  Hobbies / Sports: cooking, shopping    Smoking: No  Alcohol: No  Illicit drug use: No         Physical  Exam:     EXAMINATION pertinent findings:   PSYCH: Pleasant, healthy-appearing, alert, oriented x3, cooperative. Normal mood and affect.  VITAL SIGNS: not currently breastfeeding.  Reviewed nursing intake notes.   There is no height or weight on file to calculate BMI.  RESP: non labored breathing   ABD: benign, soft, non-tender, no acute peritoneal findings  SKIN: grossly normal   LYMPHATIC: grossly normal, no adenopathy, no extremity edema  NEURO: grossly normal , no motor deficits  VASCULAR: satisfactory perfusion of all extremities   MUSCULOSKELETAL:   Alignment: Varus alignment of bilateral lower extremities  Gait: Antalgic gait  Knees:     R knee: The incision is clean, dry, and intact with no erythema, dehiscence, or discharge. ROM 95-0-0 .  Moderate effusion.  ligamentously stable in both ML and AP direction. Normal PF tracking.  Moderate peripheral edema noted bilaterally.  Negative Homans' sign.     BLE:   Thigh and leg compartments soft and compressible   +Quad/TA/GSC/FHL/EHL   SILT DP/SP/Angelica/Saph/Tib nerve distributions   Palpable dorsalis pedis pulse          Data:   All laboratory data reviewed  All imaging studies reviewed by me personally.    XR right knee on 7/12/2024:  My interpretation: Status post right total knee arthroplasty.  Adequate sizing, fixation and orientation of components.  No signs of immediate postoperative complications.          Assessment and Plan:   Assessment:  58-year-old female with history of chronic right knee pain due to end-stage osteoarthritic changes in all 3 compartments who was insufficiently responding to nonsurgical treatment options therefore underwent a right total knee arthroplasty on 5/21/2024.  Recovering appropriately     Plan:  I extensively discussed my findings with the patient.  Patient appears to be recovering appropriately.  In regards to her postoperative fatigue her last hemoglobin was 10.3 but I would like to get a another blood draw to assess for  anemia.  I will call her back with the significant findings also recommended a high iron diet. she had this done at her convenience.  For her peripheral edema I recommend she wear bilateral compression socks as her peripheral edema was a pre-existing to prior to surgery.  Patient will continue to work with physical therapy on range of motion, strengthening and gait training.  Patient has completed the DVT prophylaxis until 4 weeks postoperatively.  Antibiotics prior to dental procedures reviewed. all questions were answered.  Patient understands and agrees to the treatment plan as set forth.  We will follow-up with patient at the 1 year postoperative date with renewed radiographic imaging studies.     Willam Chowdhury PA-C  Physician Assistant   Oncology and Adult Reconstructive Surgery  Dept Orthopaedic Surgery, [facility]    This note was created using dictation software and may contain errors.  Please contact the creator for any clarifications that are needed.          Again, thank you for allowing me to participate in the care of your patient.        Sincerely,        Willam Chowdhury PA-C

## 2024-07-15 ENCOUNTER — THERAPY VISIT (OUTPATIENT)
Dept: PHYSICAL THERAPY | Facility: CLINIC | Age: 59
End: 2024-07-15
Attending: STUDENT IN AN ORGANIZED HEALTH CARE EDUCATION/TRAINING PROGRAM
Payer: COMMERCIAL

## 2024-07-15 ENCOUNTER — LAB (OUTPATIENT)
Dept: LAB | Facility: CLINIC | Age: 59
End: 2024-07-15
Payer: COMMERCIAL

## 2024-07-15 DIAGNOSIS — Z47.1 AFTERCARE FOLLOWING RIGHT KNEE JOINT REPLACEMENT SURGERY: ICD-10-CM

## 2024-07-15 DIAGNOSIS — M25.561 ACUTE PAIN OF RIGHT KNEE: Primary | ICD-10-CM

## 2024-07-15 DIAGNOSIS — Z47.89 ORTHOPEDIC AFTERCARE: ICD-10-CM

## 2024-07-15 DIAGNOSIS — Z96.651 AFTERCARE FOLLOWING RIGHT KNEE JOINT REPLACEMENT SURGERY: ICD-10-CM

## 2024-07-15 LAB — HGB BLD-MCNC: 12.2 G/DL (ref 11.7–15.7)

## 2024-07-15 PROCEDURE — 97010 HOT OR COLD PACKS THERAPY: CPT | Mod: GP | Performed by: PHYSICAL THERAPIST

## 2024-07-15 PROCEDURE — 85018 HEMOGLOBIN: CPT

## 2024-07-15 PROCEDURE — 97110 THERAPEUTIC EXERCISES: CPT | Mod: GP | Performed by: PHYSICAL THERAPIST

## 2024-07-15 PROCEDURE — 36415 COLL VENOUS BLD VENIPUNCTURE: CPT

## 2024-07-17 DIAGNOSIS — I10 ESSENTIAL HYPERTENSION, BENIGN: ICD-10-CM

## 2024-07-17 RX ORDER — AMLODIPINE BESYLATE 5 MG/1
5 TABLET ORAL DAILY
Qty: 90 TABLET | Refills: 1 | Status: SHIPPED | OUTPATIENT
Start: 2024-07-17

## 2024-07-17 RX ORDER — LISINOPRIL 20 MG/1
TABLET ORAL
Qty: 180 TABLET | Refills: 1 | Status: SHIPPED | OUTPATIENT
Start: 2024-07-17

## 2024-07-17 NOTE — TELEPHONE ENCOUNTER
Medication Question or Refill        What medication are you calling about (include dose and sig)?: Lisonopril 20 mg and Amlodipine 5 mg    Preferred Pharmacy:   [id number] - [facility] [address] AT [facility] [address]  [facility] [address]  Phone: [phone] Fax: [phone]      Controlled Substance Agreement on file:   CSA -- Patient Level:    CSA: None found at the patient level.       Who prescribed the medication?: Dr Calderon    Do you need a refill? Yes    When did you use the medication last? Amlodipine 4 days ago and she has 4 pills left for Lisonopril    Patient offered an appointment? No    Do you have any questions or concerns?  No      Could we send this information to you in [employer] or would you prefer to receive a phone call?:   Patient would like to be contacted via [employer]

## 2024-07-18 ENCOUNTER — THERAPY VISIT (OUTPATIENT)
Dept: PHYSICAL THERAPY | Facility: CLINIC | Age: 59
End: 2024-07-18
Payer: COMMERCIAL

## 2024-07-18 DIAGNOSIS — Z96.651 AFTERCARE FOLLOWING RIGHT KNEE JOINT REPLACEMENT SURGERY: ICD-10-CM

## 2024-07-18 DIAGNOSIS — M25.561 ACUTE PAIN OF RIGHT KNEE: Primary | ICD-10-CM

## 2024-07-18 DIAGNOSIS — Z47.1 AFTERCARE FOLLOWING RIGHT KNEE JOINT REPLACEMENT SURGERY: ICD-10-CM

## 2024-07-18 PROCEDURE — 97110 THERAPEUTIC EXERCISES: CPT | Mod: GP | Performed by: PHYSICAL THERAPIST

## 2024-07-22 ENCOUNTER — MEDICAL CORRESPONDENCE (OUTPATIENT)
Dept: HEALTH INFORMATION MANAGEMENT | Facility: CLINIC | Age: 59
End: 2024-07-22
Payer: COMMERCIAL

## 2024-07-25 ENCOUNTER — THERAPY VISIT (OUTPATIENT)
Dept: PHYSICAL THERAPY | Facility: CLINIC | Age: 59
End: 2024-07-25
Payer: COMMERCIAL

## 2024-07-25 DIAGNOSIS — Z96.651 AFTERCARE FOLLOWING RIGHT KNEE JOINT REPLACEMENT SURGERY: ICD-10-CM

## 2024-07-25 DIAGNOSIS — M25.561 ACUTE PAIN OF RIGHT KNEE: Primary | ICD-10-CM

## 2024-07-25 DIAGNOSIS — Z47.1 AFTERCARE FOLLOWING RIGHT KNEE JOINT REPLACEMENT SURGERY: ICD-10-CM

## 2024-07-25 PROCEDURE — 97110 THERAPEUTIC EXERCISES: CPT | Mod: GP | Performed by: PHYSICAL THERAPIST

## 2024-08-01 ENCOUNTER — THERAPY VISIT (OUTPATIENT)
Dept: PHYSICAL THERAPY | Facility: CLINIC | Age: 59
End: 2024-08-01
Payer: COMMERCIAL

## 2024-08-01 DIAGNOSIS — Z47.1 AFTERCARE FOLLOWING RIGHT KNEE JOINT REPLACEMENT SURGERY: ICD-10-CM

## 2024-08-01 DIAGNOSIS — M25.561 ACUTE PAIN OF RIGHT KNEE: Primary | ICD-10-CM

## 2024-08-01 DIAGNOSIS — Z96.651 AFTERCARE FOLLOWING RIGHT KNEE JOINT REPLACEMENT SURGERY: ICD-10-CM

## 2024-08-01 PROCEDURE — 97110 THERAPEUTIC EXERCISES: CPT | Mod: GP | Performed by: PHYSICAL THERAPIST

## 2024-08-09 ENCOUNTER — THERAPY VISIT (OUTPATIENT)
Dept: PHYSICAL THERAPY | Facility: CLINIC | Age: 59
End: 2024-08-09
Payer: COMMERCIAL

## 2024-08-09 DIAGNOSIS — Z96.651 AFTERCARE FOLLOWING RIGHT KNEE JOINT REPLACEMENT SURGERY: ICD-10-CM

## 2024-08-09 DIAGNOSIS — Z47.1 AFTERCARE FOLLOWING RIGHT KNEE JOINT REPLACEMENT SURGERY: ICD-10-CM

## 2024-08-09 DIAGNOSIS — M25.561 ACUTE PAIN OF RIGHT KNEE: Primary | ICD-10-CM

## 2024-08-09 PROCEDURE — 97110 THERAPEUTIC EXERCISES: CPT | Mod: GP | Performed by: PHYSICAL THERAPIST

## 2024-08-16 ENCOUNTER — TELEPHONE (OUTPATIENT)
Dept: ORTHOPEDICS | Facility: CLINIC | Age: 59
End: 2024-08-16

## 2024-08-16 NOTE — TELEPHONE ENCOUNTER
Order(s): Home Care Orders: Other: Home health aid change in orders from last May.  They received the plan of care.  They need the changed visit order for Home Health Aid faxed.  Jennifer just refaxed the form [phone] and  [phone]. Please     Fax: [phone]  Order ID #[id number]    Could we send this information to you in [employer] or would you prefer to receive a phone call?:   No preference   Okay to leave a detailed message?: yes

## 2024-08-16 NOTE — TELEPHONE ENCOUNTER
Form has been received at , Form requesting provider signature. Placed in provider basket for review.      Сергей Samaniego ATC

## 2024-08-23 ENCOUNTER — MEDICAL CORRESPONDENCE (OUTPATIENT)
Dept: HEALTH INFORMATION MANAGEMENT | Facility: CLINIC | Age: 59
End: 2024-08-23
Payer: COMMERCIAL

## 2024-08-23 NOTE — TELEPHONE ENCOUNTER
Other: Jennifer from [facility] calling again to confirm if forms were faxed? She has not received it yet. Is needing signature from provider.  Phone [phone]. Fax: [phone] She needs the form with the ORDER ID #: [id number]     Could we send this information to you in [employer] or would you prefer to receive a phone call?:   Patient would prefer a phone call   Okay to leave a detailed message?: No at Other phone number:  [phone]*

## 2024-08-23 NOTE — TELEPHONE ENCOUNTER
Document signed by provider and faxed. Sent for scanning.     Kristin Pemberton MSA, ATC  Certified Athletic Trainer

## 2024-08-26 ENCOUNTER — MEDICAL CORRESPONDENCE (OUTPATIENT)
Dept: HEALTH INFORMATION MANAGEMENT | Facility: CLINIC | Age: 59
End: 2024-08-26
Payer: COMMERCIAL

## 2024-09-13 ENCOUNTER — TELEPHONE (OUTPATIENT)
Dept: INTERNAL MEDICINE | Facility: CLINIC | Age: 59
End: 2024-09-13
Payer: COMMERCIAL

## 2024-09-13 NOTE — TELEPHONE ENCOUNTER
Reason for Call:  Appointment Request    Patient requesting this type of appt:  Preventive     Requested provider: Nick Calderon    Reason patient unable to be scheduled: Not within requested timeframe    When does patient want to be seen/preferred time: 1-2 weeks    Comments: patient had appointment scheduled for 9/12 but was cancelled due to provider being out - nothing availble until 01/2025    Could we send this information to you in [employer] or would you prefer to receive a phone call?:   Patient would prefer a phone call   Okay to leave a detailed message?: Yes at Home number on file [phone] (home)    Call taken on 9/13/2024 at 9:38 AM by Shakila Norris

## 2024-09-16 ENCOUNTER — ANCILLARY PROCEDURE (OUTPATIENT)
Dept: MAMMOGRAPHY | Facility: CLINIC | Age: 59
End: 2024-09-16
Attending: INTERNAL MEDICINE
Payer: COMMERCIAL

## 2024-09-16 ENCOUNTER — TELEPHONE (OUTPATIENT)
Dept: INTERNAL MEDICINE | Facility: CLINIC | Age: 59
End: 2024-09-16

## 2024-09-16 DIAGNOSIS — Z12.31 VISIT FOR SCREENING MAMMOGRAM: ICD-10-CM

## 2024-09-16 PROCEDURE — 77063 BREAST TOMOSYNTHESIS BI: CPT | Mod: TC | Performed by: RADIOLOGY

## 2024-09-16 PROCEDURE — 77067 SCR MAMMO BI INCL CAD: CPT | Mod: TC | Performed by: RADIOLOGY

## 2024-09-16 NOTE — TELEPHONE ENCOUNTER
Reason for Call:  Appointment Request    Patient requesting this type of appt:  Preventive     Requested provider: Nick Calderon    Reason patient unable to be scheduled:  pt had appt but clinic cancelled and now [facility] Arzate is booked out until January    When does patient want to be seen/preferred time:  as soon as possible    Comments: pt needs an annual exam as soon as possible she can't wait too long with her health issues    Could we send this information to you in [employer] or would you prefer to receive a phone call?:   Patient would like to be contacted via [employer]    Call taken on 9/16/2024 at 2:00 PM by Madeleine Oleary

## 2024-09-17 ENCOUNTER — ONCOLOGY VISIT (OUTPATIENT)
Dept: ONCOLOGY | Facility: CLINIC | Age: 59
End: 2024-09-17
Attending: INTERNAL MEDICINE
Payer: COMMERCIAL

## 2024-09-17 VITALS
HEIGHT: 64 IN | DIASTOLIC BLOOD PRESSURE: 66 MMHG | SYSTOLIC BLOOD PRESSURE: 93 MMHG | HEART RATE: 95 BPM | BODY MASS INDEX: 35.1 KG/M2 | OXYGEN SATURATION: 97 % | WEIGHT: 205.6 LBS | RESPIRATION RATE: 16 BRPM

## 2024-09-17 DIAGNOSIS — D47.2 MGUS (MONOCLONAL GAMMOPATHY OF UNKNOWN SIGNIFICANCE): Primary | ICD-10-CM

## 2024-09-17 LAB
ANION GAP SERPL CALCULATED.3IONS-SCNC: 9 MMOL/L (ref 7–15)
BUN SERPL-MCNC: 11.2 MG/DL (ref 6–20)
CALCIUM SERPL-MCNC: 9.6 MG/DL (ref 8.8–10.4)
CHLORIDE SERPL-SCNC: 101 MMOL/L (ref 98–107)
CREAT SERPL-MCNC: 0.85 MG/DL (ref 0.51–0.95)
EGFRCR SERPLBLD CKD-EPI 2021: 79 ML/MIN/1.73M2
ERYTHROCYTE [DISTWIDTH] IN BLOOD BY AUTOMATED COUNT: 13.5 % (ref 10–15)
GLUCOSE SERPL-MCNC: 99 MG/DL (ref 70–99)
HCO3 SERPL-SCNC: 29 MMOL/L (ref 22–29)
HCT VFR BLD AUTO: 40.9 % (ref 35–47)
HGB BLD-MCNC: 13.1 G/DL (ref 11.7–15.7)
MCH RBC QN AUTO: 27.3 PG (ref 26.5–33)
MCHC RBC AUTO-ENTMCNC: 32 G/DL (ref 31.5–36.5)
MCV RBC AUTO: 85 FL (ref 78–100)
PLATELET # BLD AUTO: 373 10E3/UL (ref 150–450)
POTASSIUM SERPL-SCNC: 4.6 MMOL/L (ref 3.4–5.3)
RBC # BLD AUTO: 4.79 10E6/UL (ref 3.8–5.2)
SODIUM SERPL-SCNC: 139 MMOL/L (ref 135–145)
TOTAL PROTEIN SERUM FOR ELP: 7.1 G/DL (ref 6.4–8.3)
WBC # BLD AUTO: 7.8 10E3/UL (ref 4–11)

## 2024-09-17 PROCEDURE — 84155 ASSAY OF PROTEIN SERUM: CPT | Performed by: INTERNAL MEDICINE

## 2024-09-17 PROCEDURE — 82374 ASSAY BLOOD CARBON DIOXIDE: CPT | Performed by: INTERNAL MEDICINE

## 2024-09-17 PROCEDURE — 82784 ASSAY IGA/IGD/IGG/IGM EACH: CPT | Performed by: INTERNAL MEDICINE

## 2024-09-17 PROCEDURE — 85027 COMPLETE CBC AUTOMATED: CPT | Performed by: INTERNAL MEDICINE

## 2024-09-17 PROCEDURE — 99213 OFFICE O/P EST LOW 20 MIN: CPT | Performed by: INTERNAL MEDICINE

## 2024-09-17 PROCEDURE — 83521 IG LIGHT CHAINS FREE EACH: CPT | Performed by: INTERNAL MEDICINE

## 2024-09-17 PROCEDURE — 36415 COLL VENOUS BLD VENIPUNCTURE: CPT | Performed by: INTERNAL MEDICINE

## 2024-09-17 PROCEDURE — 84165 PROTEIN E-PHORESIS SERUM: CPT | Mod: 26 | Performed by: STUDENT IN AN ORGANIZED HEALTH CARE EDUCATION/TRAINING PROGRAM

## 2024-09-17 PROCEDURE — G2211 COMPLEX E/M VISIT ADD ON: HCPCS | Performed by: INTERNAL MEDICINE

## 2024-09-17 PROCEDURE — 82565 ASSAY OF CREATININE: CPT | Performed by: INTERNAL MEDICINE

## 2024-09-17 PROCEDURE — 84165 PROTEIN E-PHORESIS SERUM: CPT | Mod: TC | Performed by: STUDENT IN AN ORGANIZED HEALTH CARE EDUCATION/TRAINING PROGRAM

## 2024-09-17 PROCEDURE — 86334 IMMUNOFIX E-PHORESIS SERUM: CPT | Performed by: PATHOLOGY

## 2024-09-17 PROCEDURE — 99214 OFFICE O/P EST MOD 30 MIN: CPT | Performed by: INTERNAL MEDICINE

## 2024-09-17 PROCEDURE — 86334 IMMUNOFIX E-PHORESIS SERUM: CPT | Mod: 26 | Performed by: PATHOLOGY

## 2024-09-17 ASSESSMENT — PAIN SCALES - GENERAL: PAINLEVEL: SEVERE PAIN (6)

## 2024-09-17 NOTE — PROGRESS NOTES
"Oncology Rooming Note    September 17, 2024 9:46 AM   Jimmy Stevens is a 58 year old female who presents for:    Chief Complaint   Patient presents with    Oncology Clinic Visit     Initial Vitals: LMP  (LMP Unknown)  Estimated body mass index is 34.67 kg/m  as calculated from the following:    Height as of 7/12/24: 1.626 m (5' 4\").    Weight as of 7/12/24: 91.6 kg (202 lb). There is no height or weight on file to calculate BSA.  Data Unavailable Comment: Data Unavailable   No LMP recorded (lmp unknown). Patient has had a hysterectomy.  Allergies reviewed: Yes  Medications reviewed: Yes    Medications: Medication refills not needed today.  Pharmacy name entered into [employer]: [id number] - [place], [place] - [address] AT [facility] [address]    Frailty Screening:   Is the patient here for a new oncology consult visit in cancer care? 2. No        Vita Gagnon MA            "

## 2024-09-17 NOTE — LETTER
"9/17/2024      Jimmy Stevens  [address]  [place] [facility] [address]      Dear Colleague,    Thank you for referring your patient, Jimmy Stevens, to the [facility]. Please see a copy of my visit note below.    Oncology Rooming Note    September 17, 2024 9:46 AM   Jimmy Stevens is a 58 year old female who presents for:    Chief Complaint   Patient presents with     Oncology Clinic Visit     Initial Vitals: LMP  (LMP Unknown)  Estimated body mass index is 34.67 kg/m  as calculated from the following:    Height as of 7/12/24: 1.626 m (5' 4\").    Weight as of 7/12/24: 91.6 kg (202 lb). There is no height or weight on file to calculate BSA.  Data Unavailable Comment: Data Unavailable   No LMP recorded (lmp unknown). Patient has had a hysterectomy.  Allergies reviewed: Yes  Medications reviewed: Yes    Medications: Medication refills not needed today.  Pharmacy name entered into [employer]: [id number] - [place], [place] - [address] AT [facility] [address]    Frailty Screening:   Is the patient here for a new oncology consult visit in cancer care? 2. No        Vita Gagnon MA              HEMATOLOGY HISTORY: Ms. Stevens is a female with MGUS.  1.  On 03/08/2022:    -Normal CBC.  -Normal CMP.  2.  On 03/09/2022:    -Normal vitamin B2.  -Normal TSH.  -Normal MMA.  -Normal folate.  -Serum immunofixation reveals possible faint monoclonal IgG lambda.  3.  On 03/11/2022, SPEP reveals monoclonal peak of 0.1.  4.  On 03/23/2022:    -Serum immunofixation reveals very small monoclonal IgG lambda.  -Urine immunofixation reveals very small monoclonal IgG lambda.     SUBJECTIVE: Ms. Stevens is a 58 year old female with MGUS.  She is here for follow-up.    She had right knee replacement in 05/2024. She has mild pain in knee.     Overall she is doing well. No headache.  No dizziness.  No chest pain.  No shortness of breath at rest.  No abdominal pain.  No nausea or " vomiting.  Appetite is good.  No urinary or bowel complaints.  No bleeding.  All other review of system is negative.     PHYSICAL EXAMINATION:   GENERAL:  Alert and oriented x 3.   VITAL SIGNS:  Reviewed.    Rest of the system not examined.     LABS: CBC, BMP, SPEP, IgG and free light chain reviewed.     ASSESSMENT:  A 58-year-old female with monoclonal gammopathy of undetermined significance (MGUS).  MGUS is stable.     PLAN:  -Labs today.  -Follow-up in 1 year with labs.    DISCUSSION:  1.  Patient is doing well.  Labs were reviewed.  CBC and BMP is normal.  SPEP is normal.  No monoclonal peak.  IgG level is normal.  [place] free light chain is mildly elevated.  Will get a serum immunofixation.    MGUS is stable.  Risk of progression to myeloma is low at about 1 %/year.  Will monitor it once a year.  She is agreeable for it.    2.  She had a few questions which were all answered.  I will see her in a year with labs.     Total visit time of 30 minutes.  Time spent in today's visit, review of chart/investigations today and documentation.      Again, thank you for allowing me to participate in the care of your patient.        Sincerely,        Ronen Ortega MD

## 2024-09-17 NOTE — PROGRESS NOTES
HEMATOLOGY HISTORY: Ms. Stevens is a female with MGUS.  1.  On 03/08/2022:    -Normal CBC.  -Normal CMP.  2.  On 03/09/2022:    -Normal vitamin B2.  -Normal TSH.  -Normal MMA.  -Normal folate.  -Serum immunofixation reveals possible faint monoclonal IgG lambda.  3.  On 03/11/2022, SPEP reveals monoclonal peak of 0.1.  4.  On 03/23/2022:    -Serum immunofixation reveals very small monoclonal IgG lambda.  -Urine immunofixation reveals very small monoclonal IgG lambda.     SUBJECTIVE: Ms. Stevens is a 58 year old female with MGUS.  She is here for follow-up.    She had right knee replacement in 05/2024. She has mild pain in knee.     Overall she is doing well. No headache.  No dizziness.  No chest pain.  No shortness of breath at rest.  No abdominal pain.  No nausea or vomiting.  Appetite is good.  No urinary or bowel complaints.  No bleeding.  All other review of system is negative.     PHYSICAL EXAMINATION:   GENERAL:  Alert and oriented x 3.   VITAL SIGNS:  Reviewed.    Rest of the system not examined.     LABS: CBC, BMP, SPEP, IgG and free light chain reviewed.     ASSESSMENT:  A 58-year-old female with monoclonal gammopathy of undetermined significance (MGUS).  MGUS is stable.     PLAN:  -Labs today.  -Follow-up in 1 year with labs.    DISCUSSION:  1.  Patient is doing well.  Labs were reviewed.  CBC and BMP is normal.  SPEP is normal.  No monoclonal peak.  IgG level is normal.  [place] free light chain is mildly elevated.  Will get a serum immunofixation.    MGUS is stable.  Risk of progression to myeloma is low at about 1 %/year.  Will monitor it once a year.  She is agreeable for it.    2.  She had a few questions which were all answered.  I will see her in a year with labs.     Total visit time of 30 minutes.  Time spent in today's visit, review of chart/investigations today and documentation.

## 2024-09-18 LAB
ALBUMIN SERPL ELPH-MCNC: 4 G/DL (ref 3.7–5.1)
ALPHA1 GLOB SERPL ELPH-MCNC: 0.3 G/DL (ref 0.2–0.4)
ALPHA2 GLOB SERPL ELPH-MCNC: 0.7 G/DL (ref 0.5–0.9)
B-GLOBULIN SERPL ELPH-MCNC: 0.9 G/DL (ref 0.6–1)
GAMMA GLOB SERPL ELPH-MCNC: 1.2 G/DL (ref 0.7–1.6)
IGG SERPL-MCNC: 1269 MG/DL (ref 610–1616)
KAPPA LC FREE SER-MCNC: 2.86 MG/DL (ref 0.33–1.94)
KAPPA LC FREE/LAMBDA FREE SER NEPH: 1.48 {RATIO} (ref 0.26–1.65)
LAMBDA LC FREE SERPL-MCNC: 1.93 MG/DL (ref 0.57–2.63)
LOCATION OF TASK: NORMAL
M PROTEIN SERPL ELPH-MCNC: 0 G/DL
PROT PATTERN SERPL ELPH-IMP: NORMAL

## 2024-09-20 NOTE — RESULT ENCOUNTER NOTE
Dear Ms. Stevens,    Blood test reveals MGUS to be stable.    Please, call me with any questions.    Ronen Ortega MD

## 2024-09-23 LAB
LOCATION OF TASK: NORMAL
PROT PATTERN SERPL IFE-IMP: NORMAL

## 2024-09-30 ENCOUNTER — THERAPY VISIT (OUTPATIENT)
Dept: PHYSICAL THERAPY | Facility: CLINIC | Age: 59
End: 2024-09-30
Payer: COMMERCIAL

## 2024-09-30 DIAGNOSIS — Z47.1 AFTERCARE FOLLOWING RIGHT KNEE JOINT REPLACEMENT SURGERY: ICD-10-CM

## 2024-09-30 DIAGNOSIS — Z96.651 AFTERCARE FOLLOWING RIGHT KNEE JOINT REPLACEMENT SURGERY: ICD-10-CM

## 2024-09-30 DIAGNOSIS — M25.561 ACUTE PAIN OF RIGHT KNEE: Primary | ICD-10-CM

## 2024-09-30 PROCEDURE — 97110 THERAPEUTIC EXERCISES: CPT | Mod: GP | Performed by: PHYSICAL THERAPIST

## 2024-09-30 ASSESSMENT — ACTIVITIES OF DAILY LIVING (ADL)
WALK: ACTIVITY IS NOT DIFFICULT
AS_A_RESULT_OF_YOUR_KNEE_INJURY,_HOW_WOULD_YOU_RATE_YOUR_CURRENT_LEVEL_OF_DAILY_ACTIVITY?: NORMAL
SQUAT: ACTIVITY IS NOT DIFFICULT
GIVING WAY, BUCKLING OR SHIFTING OF KNEE: I HAVE THE SYMPTOM BUT IT DOES NOT AFFECT MY ACTIVITY
KNEE_ACTIVITY_OF_DAILY_LIVING_SUM: 63
STIFFNESS: I HAVE THE SYMPTOM BUT IT DOES NOT AFFECT MY ACTIVITY
WEAKNESS: I HAVE THE SYMPTOM BUT IT DOES NOT AFFECT MY ACTIVITY
HOW_WOULD_YOU_RATE_THE_CURRENT_FUNCTION_OF_YOUR_KNEE_DURING_YOUR_USUAL_DAILY_ACTIVITIES_ON_A_SCALE_FROM_0_TO_100_WITH_100_BEING_YOUR_LEVEL_OF_KNEE_FUNCTION_PRIOR_TO_YOUR_INJURY_AND_0_BEING_THE_INABILITY_TO_PERFORM_ANY_OF_YOUR_USUAL_DAILY_ACTIVITIES?: 95
SIT WITH YOUR KNEE BENT: ACTIVITY IS NOT DIFFICULT
KNEE_ACTIVITY_OF_DAILY_LIVING_SCORE: 90
KNEEL ON THE FRONT OF YOUR KNEE: ACTIVITY IS SOMEWHAT DIFFICULT
LIMPING: I HAVE THE SYMPTOM BUT IT DOES NOT AFFECT MY ACTIVITY
GO UP STAIRS: ACTIVITY IS NOT DIFFICULT
RAW_SCORE: 63
STAND: ACTIVITY IS NOT DIFFICULT
RISE FROM A CHAIR: ACTIVITY IS NOT DIFFICULT
SWELLING: I DO NOT HAVE THE SYMPTOM
PAIN: I HAVE THE SYMPTOM BUT IT DOES NOT AFFECT MY ACTIVITY
GO DOWN STAIRS: ACTIVITY IS NOT DIFFICULT
HOW_WOULD_YOU_RATE_THE_OVERALL_FUNCTION_OF_YOUR_KNEE_DURING_YOUR_USUAL_DAILY_ACTIVITIES?: NORMAL

## 2024-09-30 NOTE — PROGRESS NOTES
09/30/24 0500   Appointment Info   Signing clinician's name / credentials Derek Doan PT   Total/Authorized Visits 12 E and T   Visits Used 10   Medical Diagnosis s/p R TKA   PT Tx Diagnosis s/p R TKA   Other pertinent information Pt arrived 10 min late   Progress Note/Certification   Onset of illness/injury or Date of Surgery 05/21/24   Therapy Frequency 2 x week tapering to 1 time a week   Predicted Duration 12 weeks   Progress Note Completed Date 06/27/24       Present No   GOALS   PT Goals 2   PT Goal 1   Goal Description Pt will be able to tolerate walking for 20 minutes without cane   Rationale to maximize safety and independence with performance of ADLs and functional tasks;to maximize safety and independence within the home;to maximize safety and independence within the community   Goal Progress met   Target Date 07/25/24   Date Met 09/30/24   PT Goal 2   Goal Description Pt will be able to ambulate down steps in reciprocal pattern   Rationale to maximize safety and independence with performance of ADLs and functional tasks;to maximize safety and independence within the home;to maximize safety and independence within the community   Goal Progress met   Target Date 09/19/24   Date Met 09/30/24   Subjective Report   Subjective Report Pt   Objective Measure 1   Objective Measure ROM   Details PROM to 0-0-113 on wall   Objective Measure 2   Objective Measure Gait   Details No AD: continued decreased romaine and knee ext   PT Modalities   PT Modalities Cryotherapy   Treatment Interventions (PT)   Interventions Therapeutic Procedure/Exercise;Therapeutic Activity;Neuromuscular Re-education;Manual Therapy;Gait Training;Self Care/Home Management   Therapeutic Procedure/Exercise   Therapeutic Procedures: strength, endurance, ROM, flexibility minutes (35985) 30   Ther Proc 2 Stat bike seat #4-2 x6' around   Ther Proc 2 - Details Leg press, foot platform at 7   Ther Proc 3 90# x 25 bilat, 45#  "x 25 right   PTRx Ther Proc 1 Active Assisted Knee Flexion   PTRx Ther Proc 1 - Details HEP   PTRx Ther Proc 2 Stair Knee Flexion Stretch   PTRx Ther Proc 2 - Details 5\"x15 R cues to hold cues for end range   PTRx Ther Proc 3 Passive Knee Extension Stretch   PTRx Ther Proc 3 - Details 3 min with therapist oscilations/overpressure on wall   PTRx Ther Proc 4 Prone Knee Hang   PTRx Ther Proc 4 - Details HEP   PTRx Ther Proc 5 Seated Hamstring Stretch   PTRx Ther Proc 5 - Details HEP   PTRx Ther Proc 6 Hip Flexion Straight Leg Raise   PTRx Ther Proc 6 - Details HEP   PTRx Ther Proc 7 Standing Hip Abduction   PTRx Ther Proc 7 - Details HEP   PTRx Ther Proc 8 Seated Hamstring Curl with Tubing   PTRx Ther Proc 8 - Details home   PTRx Ther Proc 9 Knee Bends   PTRx Ther Proc 9 - Details x24 at sink 1x/day--cues for squat mechanics   PTRx Ther Proc 10 Stepdown Backward   PTRx Ther Proc 10 - Details 4\"x 20 1x/day--both hands on counter cues not to launch off back leg   PTRx Ther Proc 11 Stepdown Forward   PTRx Ther Proc 11 - Details 3\" x 10 with UE support clinic only poor control   Skilled Intervention significant cuing for form   Patient Response/Progress very apprehensive today   Neuromuscular Re-education   PTRx Neuro Re-ed 1 Cup Walking   PTRx Neuro Re-ed 1 - Details 4 cups down/back x 3 with hands on counter Clinic only--significant cues to go over top of cup   PTRx Neuro Re-ed 2 Balance Single Leg Stance Supported and Unsupported   PTRx Neuro Re-ed 2 - Details 30\"x3 1x/day--with 2 fingertip support   Skilled Intervention vc, vsc   Patient Response/Progress very apprehensive today   Education   Learner/Method Patient;Listening;Demonstration;Pictures/Video   Plan   Plan for next session focus on ROM, consider manual stretching   Total Session Time   Timed Code Treatment Minutes 30   Total Treatment Time (sum of timed and untimed services) 30     DISCHARGE  Reason for Discharge: Patient has met all goals.    Equipment " Issued:     Discharge Plan: Patient to continue home program.    Referring Provider:  Juan Rosales

## 2024-10-10 ENCOUNTER — OFFICE VISIT (OUTPATIENT)
Dept: INTERNAL MEDICINE | Facility: CLINIC | Age: 59
End: 2024-10-10
Payer: COMMERCIAL

## 2024-10-10 VITALS
TEMPERATURE: 97.8 F | DIASTOLIC BLOOD PRESSURE: 84 MMHG | SYSTOLIC BLOOD PRESSURE: 136 MMHG | HEART RATE: 97 BPM | OXYGEN SATURATION: 96 % | WEIGHT: 208.6 LBS | BODY MASS INDEX: 35.81 KG/M2

## 2024-10-10 DIAGNOSIS — E66.01 MORBID OBESITY (H): ICD-10-CM

## 2024-10-10 DIAGNOSIS — Z12.11 SCREEN FOR COLON CANCER: ICD-10-CM

## 2024-10-10 DIAGNOSIS — E78.5 HYPERLIPIDEMIA LDL GOAL <160: ICD-10-CM

## 2024-10-10 DIAGNOSIS — M54.50 CHRONIC BILATERAL LOW BACK PAIN WITHOUT SCIATICA: ICD-10-CM

## 2024-10-10 DIAGNOSIS — G89.29 CHRONIC BILATERAL LOW BACK PAIN WITHOUT SCIATICA: ICD-10-CM

## 2024-10-10 DIAGNOSIS — D47.2 MGUS (MONOCLONAL GAMMOPATHY OF UNKNOWN SIGNIFICANCE): ICD-10-CM

## 2024-10-10 DIAGNOSIS — Z00.00 ENCOUNTER FOR ROUTINE ADULT HEALTH EXAMINATION WITHOUT ABNORMAL FINDINGS: Primary | ICD-10-CM

## 2024-10-10 DIAGNOSIS — I10 ESSENTIAL HYPERTENSION, BENIGN: ICD-10-CM

## 2024-10-10 LAB
ALBUMIN SERPL BCG-MCNC: 4.2 G/DL (ref 3.5–5.2)
ALP SERPL-CCNC: 78 U/L (ref 40–150)
ALT SERPL W P-5'-P-CCNC: 12 U/L (ref 0–50)
AST SERPL W P-5'-P-CCNC: 16 U/L (ref 0–45)
BILIRUB DIRECT SERPL-MCNC: <0.2 MG/DL (ref 0–0.3)
BILIRUB SERPL-MCNC: 0.3 MG/DL
CHOLEST SERPL-MCNC: 200 MG/DL
FASTING STATUS PATIENT QL REPORTED: YES
HDLC SERPL-MCNC: 42 MG/DL
LDLC SERPL CALC-MCNC: 107 MG/DL
NONHDLC SERPL-MCNC: 158 MG/DL
PROT SERPL-MCNC: 7.4 G/DL (ref 6.4–8.3)
TRIGL SERPL-MCNC: 253 MG/DL

## 2024-10-10 PROCEDURE — 99396 PREV VISIT EST AGE 40-64: CPT | Performed by: INTERNAL MEDICINE

## 2024-10-10 PROCEDURE — 80076 HEPATIC FUNCTION PANEL: CPT | Performed by: INTERNAL MEDICINE

## 2024-10-10 PROCEDURE — 36415 COLL VENOUS BLD VENIPUNCTURE: CPT | Performed by: INTERNAL MEDICINE

## 2024-10-10 PROCEDURE — 80061 LIPID PANEL: CPT | Performed by: INTERNAL MEDICINE

## 2024-10-10 SDOH — HEALTH STABILITY: PHYSICAL HEALTH: ON AVERAGE, HOW MANY MINUTES DO YOU ENGAGE IN EXERCISE AT THIS LEVEL?: 20 MIN

## 2024-10-10 SDOH — HEALTH STABILITY: PHYSICAL HEALTH: ON AVERAGE, HOW MANY DAYS PER WEEK DO YOU ENGAGE IN MODERATE TO STRENUOUS EXERCISE (LIKE A BRISK WALK)?: 7 DAYS

## 2024-10-10 ASSESSMENT — SOCIAL DETERMINANTS OF HEALTH (SDOH): HOW OFTEN DO YOU GET TOGETHER WITH FRIENDS OR RELATIVES?: PATIENT DECLINED

## 2024-10-10 NOTE — PROGRESS NOTES
"Preventive Care Visit  [facility]  Nick Calderon MD, Internal Medicine  Oct 10, 2024      Assessment & Plan     Encounter for routine adult health examination without abnormal findings  Vies patient update routine vaccines and she would like to wait and do this of her own accord.    Essential hypertension, benign  Stable on therapy and continue with current medical management    MGUS (monoclonal gammopathy of unknown significance)  Following up with hematology as directed with recent lab work reviewed    Chronic bilateral low back pain without sciatica  Stable and continuing with ongoing support.  Encouraged ongoing weight loss and weight reduction    Morbid obesity (H)  Encouraged ongoing weight reduction as a limiting factor to knee pain    HYPERLIPIDEMIA LDL GOAL <160  Labs ordered as fasting per routine.  - Hepatic function panel; Future  - Lipid Profile; Future    Screen for colon cancer  Prior colonoscopy reviewed and again asked patient to obtain copy for me.  Suggest annual FIT testing  - Fecal colorectal cancer screen FIT; Future    Patient has been advised of split billing requirements and indicates understanding: Yes        BMI  Estimated body mass index is 35.81 kg/m  as calculated from the following:    Height as of 9/17/24: 1.626 m (5' 4\").    Weight as of this encounter: 94.6 kg (208 lb 9.6 oz).   Weight management plan: Discussed healthy diet and exercise guidelines    Counseling  Appropriate preventive services were addressed with this patient via screening, questionnaire, or discussion as appropriate for fall prevention, nutrition, physical activity, Tobacco-use cessation, social engagement, weight loss and cognition.  Checklist reviewing preventive services available has been given to the patient.  Reviewed patient's diet, addressing concerns and/or questions.   She is at risk for psychosocial distress and has been provided with information to reduce risk.       Work on " weight loss  Regular exercise    Jordyn Marquez is a 58 year old, presenting for the following:  Physical        10/10/2024    10:03 AM   Additional Questions   Roomed by Elizabeth     Health Care Directive  Patient does not have a Health Care Directive or Living Will: Advance Directive received and scanned. Click on Code in the patient header to view.    HPI:    Wellness exam          10/10/2024   General Health   How would you rate your overall physical health? (!) FAIR   Feel stress (tense, anxious, or unable to sleep) Only a little      (!) STRESS CONCERN      10/10/2024   Nutrition   Three or more servings of calcium each day? Yes   Diet: Low salt    Carbohydrate counting    Vegetarian/vegan   How many servings of fruit and vegetables per day? (!) 2-3   How many sweetened beverages each day? 0-1       Multiple values from one day are sorted in reverse-chronological order         10/10/2024   Exercise   Days per week of moderate/strenous exercise 7 days   Average minutes spent exercising at this level 20 min            10/10/2024   Social Factors   Frequency of gathering with friends or relatives Patient declined   Worry food won't last until get money to buy more Patient declined   Food not last or not have enough money for food? Patient declined   Do you have housing? (Housing is defined as stable permanent housing and does not include staying ouside in a car, in a tent, in an abandoned building, in an overnight shelter, or couch-surfing.) Yes   Are you worried about losing your housing? No   Lack of transportation? Patient declined   Unable to get utilities (heat,electricity)? No            10/10/2024   Fall Risk   Fallen 2 or more times in the past year? Yes   Trouble with walking or balance? Yes            10/10/2024   Dental   Dentist two times every year? Yes            10/10/2024   TB Screening   Were you born outside of the [place]? Yes          Today's PHQ-2 Score:       5/7/2024     2:17 AM   PHQ-2  ( 1999 [employer])   Q1: Little interest or pleasure in doing things 2   Q2: Feeling down, depressed or hopeless 0   PHQ-2 Score 2   Q1: Little interest or pleasure in doing things More than half the days   Q2: Feeling down, depressed or hopeless Not at all   PHQ-2 Score 2         10/10/2024   Substance Use   Alcohol more than 3/day or more than 7/wk No   Do you use any other substances recreationally? (!) DECLINE        Social History     Tobacco Use    Smoking status: Never    Smokeless tobacco: Never   Vaping Use    Vaping status: Never Used   Substance Use Topics    Alcohol use: Never    Drug use: Never           9/16/2024   LAST FHS-7 RESULTS   1st degree relative breast or ovarian cancer No   Any relative bilateral breast cancer No   Any male have breast cancer No   Any ONE woman have BOTH breast AND ovarian cancer No   Any woman with breast cancer before 50yrs No   2 or more relatives with breast AND/OR ovarian cancer No   2 or more relatives with breast AND/OR bowel cancer No        Mammogram Screening - Mammogram every 1-2 years updated in Health Maintenance based on mutual decision making        10/10/2024   STI Screening   New sexual partner(s) since last STI/HIV test? (!) DECLINE        History of abnormal Pap smear: No - age 65 or older with adequate negative prior screening test results (3 consecutive negative cytology results, 2 consecutive negative cotesting results, or 2 consecutive negative HrHPV test results within 10 years, with the most recent test occurring within the recommended screening interval for the test used)        Latest Ref Rng & Units 3/10/2016     7:00 AM 3/10/2016    12:00 AM 11/19/2007    12:00 AM   PAP / HPV   PAP (Historical)   NIL  NIL    HPV 16 DNA NEG Negative      HPV 18 DNA NEG Negative      Other HR HPV NEG Negative        ASCVD Risk   The 10-year ASCVD risk score (Adriana JANG, et al., 2019) is: 2.3%    Values used to calculate the score:      Age: 58 years      Sex:  "Female      Is Non- : No      Diabetic: No      Tobacco smoker: No      Systolic Blood Pressure: 93 mmHg      Is BP treated: Yes      HDL Cholesterol: 38 mg/dL      Total Cholesterol: 180 mg/dL    Reviewed and updated as needed this visit by Provider                    Lab work is in process pending      Review of Systems  CONSTITUTIONAL: NEGATIVE for fever, chills, change in weight  EYES: NEGATIVE for vision changes or irritation  ENT/MOUTH: NEGATIVE for ear, mouth and throat problems  RESP: NEGATIVE for significant cough or SOB  CV: NEGATIVE for chest pain, palpitations or peripheral edema  GI: NEGATIVE for nausea, abdominal pain, heartburn, or change in bowel habits  : NEGATIVE for frequency, dysuria, or hematuria  MUSCULOSKELETAL: + for significant arthralgias or myalgia back and knees.  NEURO: NEGATIVE for weakness, dizziness or paresthesias  ENDOCRINE: NEGATIVE for temperature intolerance, skin/hair changes  HEME: NEGATIVE for bleeding problems  PSYCHIATRIC: NEGATIVE for changes in mood or affect     Objective :    Exam  /84   Pulse 97   Temp 97.8  F (36.6  C) (Temporal)   Wt 94.6 kg (208 lb 9.6 oz)   LMP  (LMP Unknown)   SpO2 96%   BMI 35.81 kg/m     Estimated body mass index is 35.81 kg/m  as calculated from the following:    Height as of 9/17/24: 1.626 m (5' 4\").    Weight as of this encounter: 94.6 kg (208 lb 9.6 oz).    Physical Exam:    GENERAL: alert and no distress  EYES: Eyes grossly normal to inspection, PERRL and conjunctivae and sclerae normal  HENT: ear canals and TM's normal, nose and mouth without ulcers or lesions  NECK: no adenopathy, no asymmetry, masses, or scars  RESP: lungs clear to auscultation - no rales, rhonchi or wheezes  CV: regular rate and rhythm, normal S1 S2, no S3 or S4, no murmur, click or rub  Obese  Gait antalgic  NEURO: No focal changes  PSYCH: mentation appears normal, affect normal/bright      Signed Electronically by: Nick Calderon, " MD

## 2024-11-21 ENCOUNTER — HOSPITAL ENCOUNTER (EMERGENCY)
Facility: CLINIC | Age: 59
Discharge: HOME | End: 2024-11-21
Attending: PHYSICIAN ASSISTANT | Admitting: PHYSICIAN ASSISTANT
Payer: COMMERCIAL

## 2024-11-21 VITALS
TEMPERATURE: 96.8 F | WEIGHT: 211.64 LBS | HEART RATE: 105 BPM | HEIGHT: 64 IN | RESPIRATION RATE: 22 BRPM | OXYGEN SATURATION: 98 % | BODY MASS INDEX: 36.13 KG/M2 | DIASTOLIC BLOOD PRESSURE: 92 MMHG | SYSTOLIC BLOOD PRESSURE: 132 MMHG

## 2024-11-21 DIAGNOSIS — R21 RASH AND NONSPECIFIC SKIN ERUPTION: ICD-10-CM

## 2024-11-21 PROCEDURE — 99284 EMERGENCY DEPT VISIT MOD MDM: CPT

## 2024-11-21 RX ORDER — HYDROXYZINE HYDROCHLORIDE 25 MG/1
50 TABLET, FILM COATED ORAL 3 TIMES DAILY PRN
Qty: 30 TABLET | Refills: 0 | Status: SHIPPED | OUTPATIENT
Start: 2024-11-21

## 2024-11-21 RX ORDER — PREDNISONE 20 MG/1
TABLET ORAL
Qty: 10 TABLET | Refills: 0 | Status: SHIPPED | OUTPATIENT
Start: 2024-11-21

## 2024-11-21 ASSESSMENT — COLUMBIA-SUICIDE SEVERITY RATING SCALE - C-SSRS
2. HAVE YOU ACTUALLY HAD ANY THOUGHTS OF KILLING YOURSELF IN THE PAST MONTH?: NO
6. HAVE YOU EVER DONE ANYTHING, STARTED TO DO ANYTHING, OR PREPARED TO DO ANYTHING TO END YOUR LIFE?: NO
1. IN THE PAST MONTH, HAVE YOU WISHED YOU WERE DEAD OR WISHED YOU COULD GO TO SLEEP AND NOT WAKE UP?: NO

## 2024-11-21 NOTE — ED PROVIDER NOTES
History     Chief Complaint:  Rash       HPI   Jimmy Stevens is a 59 year old female presents with concerns regarding a rash.  The rash has been present for 2 weeks.  It started on her right shoulder or arm but that has spread to her back abdomen left chest and left arm.  She notes it is itchy and not painful.  No fevers.  No new medications, lotions, detergents, foods.  Precipitant seems to be unknown.  She did report about a month ago she refilled her medication one of her pills looked different as it was a from a different carrier.  She is having no difficulty breathing.  She notes that she has a different type of rash as well with located rash on her knee following her knee surgery that has been more of a dark brown appearance.  This does not bother her.  She is try hydrocortisone twice a day for a week without relief.  She is also taken Benadryl after she was taking 50 mg then reduce to 25 mg as it was making her sleepy.  She is accompanied by her daughter today.      Independent Historian:    Daughter - They report partial HPI    Review of External Notes:        Medications:    hydrOXYzine HCl (ATARAX) 25 MG tablet  predniSONE (DELTASONE) 20 MG tablet  acetaminophen (TYLENOL) 325 MG tablet  amLODIPine (NORVASC) 5 MG tablet  aspirin 81 MG EC tablet  ibuprofen (ADVIL/MOTRIN) 600 MG tablet  lisinopril (ZESTRIL) 20 MG tablet  methocarbamol (ROBAXIN) 750 MG tablet  Multiple Vitamin (MULTIVITAMIN ADULT PO)  Turmeric POWD        Past Medical History:    Past Medical History:   Diagnosis Date    Acute stress reaction 09/09/2009    Arthritis     Essential hypertension, benign 03/15/2016    History of blood transfusion 1989    Hyperlipidemia LDL goal <160 10/31/2010    Iron deficiency anemia, unspecified     Knee pain 09/09/2009       Past Surgical History:    Past Surgical History:   Procedure Laterality Date    ARTHROPLASTY KNEE Right 5/21/2024    Procedure: Right total knee arthroplasty;  Surgeon:  "Juan Rosales MD;  Location: [facility] OR    HC REMOVAL OF OVARY/TUBE(S)  3/99    Salpingo-Oophorectomy, Unilateral    HYSTERECTOMY, PAP NO LONGER INDICATED  3/99    [facility] NONSPECIFIC PROCEDURE  10/29/99    MRI brain normal    [facility] NONSPECIFIC PROCEDURE  02/00    B breast reduction surgery    [facility] TOTAL ABDOM HYSTERECTOMY  3/99    Hysterectomy, Total Abdominal          Physical Exam   Patient Vitals for the past 24 hrs:   BP Temp Temp src Pulse Resp SpO2 Height Weight   11/21/24 1224 (!) 132/92 96.8  F (36  C) Temporal 105 22 98 % 1.626 m (5' 4\") --   11/21/24 1222 -- -- -- -- -- -- -- 96 kg (211 lb 10.3 oz)        Physical Exam  Vitals and nursing note reviewed.   Constitutional:       Appearance: She is not diaphoretic.   Eyes:      General: No scleral icterus.  Cardiovascular:      Rate and Rhythm: Normal rate and regular rhythm.      Heart sounds: Normal heart sounds. No murmur heard.     No friction rub. No gallop.   Pulmonary:      Effort: Pulmonary effort is normal. No respiratory distress.      Breath sounds: Normal breath sounds. No stridor. No wheezing, rhonchi or rales.   Skin:     Findings: Rash present. Rash is urticarial.             Comments: Flat wheal and hive appearance erythema what appear to be hives located on the right arm abdomen left chest.  This blanches.  Not raised not crusty and not nodule not scaly not vesicular.  Back she has erythematous-violaceous spots on her back that are itchy.    Separate type of rash on the right knee irregular dark pigmented flat rash.  Not bleeding not tender not itchy.   Neurological:      Mental Status: She is oriented to person, place, and time. Mental status is at baseline.   Psychiatric:         Mood and Affect: Mood normal.         Behavior: Behavior normal.         Thought Content: Thought content normal.           Emergency Department Course       Imaging:  No orders to display       Laboratory:  Labs Ordered and Resulted from Time of ED Arrival to Time of ED " Departure - No data to display     Procedures       Emergency Department Course & Assessments:    Interventions:  Medications - No data to display         Independent Interpretation (X-rays, CTs, rhythm strip):      Consultations/Discussion of Management or Tests:  None       Social Drivers of Health affecting care:  Stress/Adjustment Disorders     Disposition:  The patient was discharged.    Impression & Plan    CMS Diagnoses: None       Medical Decision Making:    This is a very pleasant 59 year old female who presented with generalized urticaria.  The precipitant appears to be  unclear.  There is no oropharyngeal swelling, respiratory distress, or GI symptoms to suggest anaphylaxis. I recommended 4 days of prednisone, sertraline, hydroxyzine, prn itching/rash, and primary care follow up in 2-3 days.  This could be urticarial rash versus urticarial vasculitis versus other.  I do not think this is cellulitis impetigo.  There is no severe signs of SJS or TE N.  Patient rash on her right knee is different than the more widespread rash and this looks like it should be evaluated by dermatology to make sure it is not melanoma versus other.  I placed a referral for allergy and dermatology at the patient's request.  In the meantime she should follow with her primary care provider.  I advised the patient to return for worse itching, oropharyngeal swelling, difficulty breathing, or for any other concerning symptoms.        Diagnosis:    ICD-10-CM    1. Rash and nonspecific skin eruption  R21 Adult Allergy/Asthma  Referral     Adult Dermatology  Referral           Discharge Medications:  Discharge Medication List as of 11/21/2024  1:36 PM        START taking these medications    Details   hydrOXYzine HCl (ATARAX) 25 MG tablet Take 2 tablets (50 mg) by mouth 3 times daily as needed for itching., Disp-30 tablet, R-0, Local Print      predniSONE (DELTASONE) 20 MG tablet Take two tablets (= 40mg) each day for 5  (five) days, Disp-10 tablet, R-0, Local Print            11/21/2024   Thor Shelton PA-C Kruger, Jacob C, PA-C  11/21/24 0364

## 2024-11-21 NOTE — ED TRIAGE NOTES
Pt here for generalized pruritic rash that began on her back and is now spread to other parts of her body. Denies pain. No new soaps, detergents, or medications. Has tried vaseline, cortisone cream, and benadryl w/o relief or improvement in her symptoms. Denies fevers, SOB, or swelling.

## 2024-11-22 ENCOUNTER — PATIENT OUTREACH (OUTPATIENT)
Dept: INTERNAL MEDICINE | Facility: CLINIC | Age: 59
End: 2024-11-22
Payer: COMMERCIAL

## 2025-03-03 ENCOUNTER — OFFICE VISIT (OUTPATIENT)
Dept: DERMATOLOGY | Facility: CLINIC | Age: 60
End: 2025-03-03
Attending: PHYSICIAN ASSISTANT
Payer: COMMERCIAL

## 2025-03-03 DIAGNOSIS — L24.89 IRRITANT CONTACT DERMATITIS DUE TO OTHER AGENTS: Primary | ICD-10-CM

## 2025-03-03 DIAGNOSIS — L23.89 ALLERGIC CONTACT DERMATITIS DUE TO OTHER AGENTS: ICD-10-CM

## 2025-03-03 DIAGNOSIS — R21 RASH AND NONSPECIFIC SKIN ERUPTION: ICD-10-CM

## 2025-03-03 PROCEDURE — 99204 OFFICE O/P NEW MOD 45 MIN: CPT | Performed by: STUDENT IN AN ORGANIZED HEALTH CARE EDUCATION/TRAINING PROGRAM

## 2025-03-03 PROCEDURE — 1126F AMNT PAIN NOTED NONE PRSNT: CPT | Performed by: STUDENT IN AN ORGANIZED HEALTH CARE EDUCATION/TRAINING PROGRAM

## 2025-03-03 RX ORDER — TRIAMCINOLONE ACETONIDE 1 MG/G
CREAM TOPICAL 2 TIMES DAILY
Qty: 454 G | Refills: 4 | Status: SHIPPED | OUTPATIENT
Start: 2025-03-03

## 2025-03-03 ASSESSMENT — PAIN SCALES - GENERAL: PAINLEVEL_OUTOF10: NO PAIN (0)

## 2025-03-03 NOTE — PROGRESS NOTES
[facility] Dermatology Note    Encounter Date: Mar 3, 2025    Dermatology Problem List:    ______________________________________    Impression/Plan:  Jimmy was seen today for rash.    Diagnoses and all orders for this visit:    Irritant contact dermatitis due to other agents  Allergic contact dermatitis due to other agents  Rash and nonspecific skin eruption  -     Adult Dermatology  Referral  -     triamcinolone (KENALOG) 0.1 % external cream; Apply topically 2 times daily.  -Eczematous dermatitis on the abdomen stopping where her black elastic pants stop.  Also has a hyperpigmented patch on her right knee and describes itching on her bilateral shoulders  -Recommend switching to white cotton  - Recommend starting triamcinolone twice daily along with continued emollient use  - She is concerned that she could be having systemic allergic contact dermatitis given her recent knee replacement plan-discussed that you can have systemic allergic contact dermatitis due to metals being released from orthopedic implants, although these are often titanium given that there was a 4-month delay between the procedure and onset this is also unlikely  - Even if it were, we could probably start her on an immune modulator rather than having her undergo a revision  - If she is not improved with these conservative measures we can send her for patch testing        Follow-up in 1 mo.       Staff Involved:  Staff Only    Roverto Nunn MD   of Dermatology  Department of Dermatology  [facility]      CC:   Chief Complaint   Patient presents with    Rash       History of Present Illness:  Ms. Jimmy Stevens is a 59 year old female who presents as a new patient.      Had knee surgery (replacement) in may. In October she started feeling itching on knee. Has been using Cerave and other moisturizers.     Has developed itching on abdomen stopping at level of  pants over abdomen    Is having itching on shoulders as well.     Had vaccines, asked pharmacist if it could be a trigger.     Benadryl made her sleepy     Labs:      Physical exam:  Vitals: LMP  (LMP Unknown)   Breastfeeding No   GEN: well developed, well-nourished, in no acute distress, in a pleasant mood.     SKIN: Jose phototype 4  - Focused examination of the abdomen was performed.  -Patchy hyperpigmentation started with excoriations and fine scaling  - No other lesions of concern on areas examined.     Past Medical History:   Past Medical History:   Diagnosis Date    Acute stress reaction 09/09/2009    Arthritis     Essential hypertension, benign 03/15/2016    History of blood transfusion 1989    Hyperlipidemia LDL goal <160 10/31/2010    Iron deficiency anemia, unspecified     Knee pain 09/09/2009     Past Surgical History:   Procedure Laterality Date    ARTHROPLASTY KNEE Right 5/21/2024    Procedure: Right total knee arthroplasty;  Surgeon: Juan Rosales MD;  Location: [facility] OR    HC REMOVAL OF OVARY/TUBE(S)  3/99    Salpingo-Oophorectomy, Unilateral    HYSTERECTOMY, PAP NO LONGER INDICATED  3/99    [facility] NONSPECIFIC PROCEDURE  10/29/99    MRI brain normal    [facility] NONSPECIFIC PROCEDURE  02/00    B breast reduction surgery    [facility] TOTAL ABDOM HYSTERECTOMY  3/99    Hysterectomy, Total Abdominal       Social History:   reports that she has never smoked. She has never used smokeless tobacco. She reports that she does not drink alcohol and does not use drugs.    Family History:  Family History   Problem Relation Age of Onset    Genitourinary Problems Mother         B:1942 Alive    Family History Negative Father         B:1940 Alive    Family History Negative Sister         5 sisters all healthy    Family History Negative Brother         1 brother healthy       Medications:  Current Outpatient Medications   Medication Sig Dispense Refill    triamcinolone (KENALOG) 0.1 % external cream Apply topically 2 times  daily. 454 g 4    acetaminophen (TYLENOL) 325 MG tablet Take 2 tablets (650 mg) by mouth every 4 hours as needed for other (mild pain) (Patient not taking: Reported on 3/3/2025) 100 tablet 0    amLODIPine (NORVASC) 5 MG tablet Take 1 tablet (5 mg) by mouth daily. Hold for blood pressure lower than 140 on the higher number (Patient not taking: Reported on 3/3/2025) 90 tablet 1    aspirin 81 MG EC tablet Take 1 tablet (81 mg) by mouth 2 times daily (Patient not taking: Reported on 3/3/2025) 60 tablet 0    hydrOXYzine HCl (ATARAX) 25 MG tablet Take 2 tablets (50 mg) by mouth 3 times daily as needed for itching. (Patient not taking: Reported on 3/3/2025) 30 tablet 0    ibuprofen (ADVIL/MOTRIN) 600 MG tablet Take 1 tablet (600 mg) by mouth every 6 hours as needed for mild pain (Patient not taking: Reported on 3/3/2025) 30 tablet 0    lisinopril (ZESTRIL) 20 MG tablet TAKE 1 TABLET BY MOUTH TWICE A DAY (Hold for blood pressure less than 140 on the higher number) (Patient not taking: Reported on 3/3/2025) 180 tablet 1    methocarbamol (ROBAXIN) 750 MG tablet Take 1 tablet (750 mg) by mouth every 8 hours as needed for muscle spasms (Patient not taking: Reported on 3/3/2025) 30 tablet 0    Multiple Vitamin (MULTIVITAMIN ADULT PO) Take 1 tablet by mouth daily (Patient not taking: Reported on 3/3/2025)      predniSONE (DELTASONE) 20 MG tablet Take two tablets (= 40mg) each day for 5 (five) days (Patient not taking: Reported on 3/3/2025) 10 tablet 0    Turmeric POWD daily (Patient not taking: Reported on 3/3/2025)       Allergies   Allergen Reactions    H2 Antagonists      Mylan (itching)    Minocycline Hives

## 2025-03-03 NOTE — PATIENT INSTRUCTIONS
Your skin looks like it has a combination of irritant and possibly allergic contact dermatitis. Chronic friction can cause dryness and inflammation    use triamcinolone to the rash twice daily until rash is smooth and you cannot feel it when you rub your finger over the area with your eyes closed     Moisturizer twice daily with ointment > cream > lotion

## 2025-03-03 NOTE — LETTER
3/3/2025      Jimmy Stevens  [address]  [facility] [address]      Dear Colleague,    Thank you for referring your patient, Jimmy Stevens, to the [facility]. Please see a copy of my visit note below.    [facility] Dermatology Note    Encounter Date: Mar 3, 2025    Dermatology Problem List:    ______________________________________    Impression/Plan:  Jimmy was seen today for rash.    Diagnoses and all orders for this visit:    Irritant contact dermatitis due to other agents  Allergic contact dermatitis due to other agents  Rash and nonspecific skin eruption  -     Adult Dermatology  Referral  -     triamcinolone (KENALOG) 0.1 % external cream; Apply topically 2 times daily.  -Eczematous dermatitis on the abdomen stopping where her black elastic pants stop.  Also has a hyperpigmented patch on her right knee and describes itching on her bilateral shoulders  -Recommend switching to white cotton  - Recommend starting triamcinolone twice daily along with continued emollient use  - She is concerned that she could be having systemic allergic contact dermatitis given her recent knee replacement plan-discussed that you can have systemic allergic contact dermatitis due to metals being released from orthopedic implants, although these are often titanium given that there was a 4-month delay between the procedure and onset this is also unlikely  - Even if it were, we could probably start her on an immune modulator rather than having her undergo a revision  - If she is not improved with these conservative measures we can send her for patch testing        Follow-up in 1 mo.       Staff Involved:  Staff Only    Roverto Nunn MD   of Dermatology  Department of Dermatology  [facility]      CC:   Chief Complaint   Patient presents with     Rash       History of Present Illness:  Ms. Marquez  Rodney is a 59 year old female who presents as a new patient.      Had knee surgery (replacement) in may. In October she started feeling itching on knee. Has been using Cerave and other moisturizers.     Has developed itching on abdomen stopping at level of pants over abdomen    Is having itching on shoulders as well.     Had vaccines, asked pharmacist if it could be a trigger.     Benadryl made her sleepy     Labs:      Physical exam:  Vitals: LMP  (LMP Unknown)   Breastfeeding No   GEN: well developed, well-nourished, in no acute distress, in a pleasant mood.     SKIN: Jose phototype 4  - Focused examination of the abdomen was performed.  -Patchy hyperpigmentation started with excoriations and fine scaling  - No other lesions of concern on areas examined.     Past Medical History:   Past Medical History:   Diagnosis Date     Acute stress reaction 09/09/2009     Arthritis      Essential hypertension, benign 03/15/2016     History of blood transfusion 1989     Hyperlipidemia LDL goal <160 10/31/2010     Iron deficiency anemia, unspecified      Knee pain 09/09/2009     Past Surgical History:   Procedure Laterality Date     ARTHROPLASTY KNEE Right 5/21/2024    Procedure: Right total knee arthroplasty;  Surgeon: Juan Rosales MD;  Location: [facility] OR     HC REMOVAL OF OVARY/TUBE(S)  3/99    Salpingo-Oophorectomy, Unilateral     HYSTERECTOMY, PAP NO LONGER INDICATED  3/99     [facility] NONSPECIFIC PROCEDURE  10/29/99    MRI brain normal     [facility] NONSPECIFIC PROCEDURE  02/00    B breast reduction surgery     [facility] TOTAL ABDOM HYSTERECTOMY  3/99    Hysterectomy, Total Abdominal       Social History:   reports that she has never smoked. She has never used smokeless tobacco. She reports that she does not drink alcohol and does not use drugs.    Family History:  Family History   Problem Relation Age of Onset     Genitourinary Problems Mother         B:1942 Alive     Family History Negative Father         B:1940 Alive      Family History Negative Sister         5 sisters all healthy     Family History Negative Brother         1 brother healthy       Medications:  Current Outpatient Medications   Medication Sig Dispense Refill     triamcinolone (KENALOG) 0.1 % external cream Apply topically 2 times daily. 454 g 4     acetaminophen (TYLENOL) 325 MG tablet Take 2 tablets (650 mg) by mouth every 4 hours as needed for other (mild pain) (Patient not taking: Reported on 3/3/2025) 100 tablet 0     amLODIPine (NORVASC) 5 MG tablet Take 1 tablet (5 mg) by mouth daily. Hold for blood pressure lower than 140 on the higher number (Patient not taking: Reported on 3/3/2025) 90 tablet 1     aspirin 81 MG EC tablet Take 1 tablet (81 mg) by mouth 2 times daily (Patient not taking: Reported on 3/3/2025) 60 tablet 0     hydrOXYzine HCl (ATARAX) 25 MG tablet Take 2 tablets (50 mg) by mouth 3 times daily as needed for itching. (Patient not taking: Reported on 3/3/2025) 30 tablet 0     ibuprofen (ADVIL/MOTRIN) 600 MG tablet Take 1 tablet (600 mg) by mouth every 6 hours as needed for mild pain (Patient not taking: Reported on 3/3/2025) 30 tablet 0     lisinopril (ZESTRIL) 20 MG tablet TAKE 1 TABLET BY MOUTH TWICE A DAY (Hold for blood pressure less than 140 on the higher number) (Patient not taking: Reported on 3/3/2025) 180 tablet 1     methocarbamol (ROBAXIN) 750 MG tablet Take 1 tablet (750 mg) by mouth every 8 hours as needed for muscle spasms (Patient not taking: Reported on 3/3/2025) 30 tablet 0     Multiple Vitamin (MULTIVITAMIN ADULT PO) Take 1 tablet by mouth daily (Patient not taking: Reported on 3/3/2025)       predniSONE (DELTASONE) 20 MG tablet Take two tablets (= 40mg) each day for 5 (five) days (Patient not taking: Reported on 3/3/2025) 10 tablet 0     Turmeric POWD daily (Patient not taking: Reported on 3/3/2025)       Allergies   Allergen Reactions     H2 Antagonists      Mylan (itching)     Minocycline Hives               Again, thank you  for allowing me to participate in the care of your patient.        Sincerely,        Roverto Nunn MD    Electronically signed

## 2025-04-29 NOTE — PROGRESS NOTES
[facility]  Orthopaedic Surgery Consultation by Juan Rosales M.D.    Jimmy Stevens MRN# [id number]   Age: 58 year old YOB: 1965     Requesting physician: Nick Landrum     Background history:  DX:  Hyperlipidemia  Hypertension  Neuropathy  Chronic low back pain  Chronic right knee pain  Morbid obesity     TREATMENTS:  9/27/23, right kne CSI, Dr. Mae  5/21/2024, right TKA, [place]           History of Present Illness:     58-year-old female presents today approximately 6 weeks status post right total knee arthroplasty.  Patient was last seen on 6/06/24 with Chente Waterman. Patient reports that therapy is going well but is feeling strenuous. She reports that her ankles will get swollen after being up and walking or doing therapy but will go down after she sits and elevates. She is noticing some bruising around the shin that seems to be getting bigger. She reports her leg is getting stiff. She walks without any assistive devices but is slow moving and stiff while walking. She reports some numbness over the area of bruising and swelling around the calf and shin.  Her daughter and physical therapist are concerned about the amount of fatigue she is having and she states she has not been sleeping well at night.      Current symptoms:  Problem: right knee pain  Onset and duration: began after a fall in 2016  Awakens from sleep due to sx's:  Yes  Precipitating Injury:  Yes    Other joints or sites painful:  Yes    Social:   Occupation: [employer]   Living situation: lives with her daughter  Hobbies / Sports: cooking, shopping    Smoking: No  Alcohol: No  Illicit drug use: No         Physical Exam:     EXAMINATION pertinent findings:   PSYCH: Pleasant, healthy-appearing, alert, oriented x3, cooperative. Normal mood and affect.  VITAL SIGNS: not currently breastfeeding.  Reviewed nursing intake notes.   There is no height or weight on file to calculate  BMI.  RESP: non labored breathing   ABD: benign, soft, non-tender, no acute peritoneal findings  SKIN: grossly normal   LYMPHATIC: grossly normal, no adenopathy, no extremity edema  NEURO: grossly normal , no motor deficits  VASCULAR: satisfactory perfusion of all extremities   MUSCULOSKELETAL:   Alignment: Varus alignment of bilateral lower extremities  Gait: Antalgic gait  Knees:     R knee: The incision is clean, dry, and intact with no erythema, dehiscence, or discharge. ROM 95-0-0 .  Moderate effusion.  ligamentously stable in both ML and AP direction. Normal PF tracking.  Moderate peripheral edema noted bilaterally.  Negative Homans' sign.     BLE:   Thigh and leg compartments soft and compressible   +Quad/TA/GSC/FHL/EHL   SILT DP/SP/Angelica/Saph/Tib nerve distributions   Palpable dorsalis pedis pulse          Data:   All laboratory data reviewed  All imaging studies reviewed by me personally.    XR right knee on 7/12/2024:  My interpretation: Status post right total knee arthroplasty.  Adequate sizing, fixation and orientation of components.  No signs of immediate postoperative complications.          Assessment and Plan:   Assessment:  58-year-old female with history of chronic right knee pain due to end-stage osteoarthritic changes in all 3 compartments who was insufficiently responding to nonsurgical treatment options therefore underwent a right total knee arthroplasty on 5/21/2024.  Recovering appropriately     Plan:  I extensively discussed my findings with the patient.  Patient appears to be recovering appropriately.  In regards to her postoperative fatigue her last hemoglobin was 10.3 but I would like to get a another blood draw to assess for anemia.  I will call her back with the significant findings also recommended a high iron diet. she had this done at her convenience.  For her peripheral edema I recommend she wear bilateral compression socks as her peripheral edema was a pre-existing to prior to  surgery.  Patient will continue to work with physical therapy on range of motion, strengthening and gait training.  Patient has completed the DVT prophylaxis until 4 weeks postoperatively.  Antibiotics prior to dental procedures reviewed. all questions were answered.  Patient understands and agrees to the treatment plan as set forth.  We will follow-up with patient at the 1 year postoperative date with renewed radiographic imaging studies.     Willam Chowdhury PA-C  Physician Assistant   Oncology and Adult Reconstructive Surgery  Dept Orthopaedic Surgery, [facility]    This note was created using dictation software and may contain errors.  Please contact the creator for any clarifications that are needed.       w/ RW/good balance

## 2025-05-22 ENCOUNTER — MYC REFILL (OUTPATIENT)
Dept: INTERNAL MEDICINE | Facility: CLINIC | Age: 60
End: 2025-05-22
Payer: COMMERCIAL

## 2025-05-22 DIAGNOSIS — I10 ESSENTIAL HYPERTENSION, BENIGN: ICD-10-CM

## 2025-05-22 RX ORDER — AMLODIPINE BESYLATE 5 MG/1
5 TABLET ORAL DAILY
Qty: 90 TABLET | Refills: 1 | Status: SHIPPED | OUTPATIENT
Start: 2025-05-22

## 2025-05-22 RX ORDER — LISINOPRIL 20 MG/1
TABLET ORAL
Qty: 180 TABLET | Refills: 1 | Status: SHIPPED | OUTPATIENT
Start: 2025-05-22

## 2025-06-11 ENCOUNTER — TELEPHONE (OUTPATIENT)
Dept: ONCOLOGY | Facility: CLINIC | Age: 60
End: 2025-06-11
Payer: COMMERCIAL

## 2025-07-01 ENCOUNTER — TELEPHONE (OUTPATIENT)
Dept: ONCOLOGY | Facility: CLINIC | Age: 60
End: 2025-07-01
Payer: COMMERCIAL

## 2025-07-24 ENCOUNTER — LAB (OUTPATIENT)
Dept: INFUSION THERAPY | Facility: CLINIC | Age: 60
End: 2025-07-24
Attending: INTERNAL MEDICINE
Payer: COMMERCIAL

## 2025-07-24 ENCOUNTER — ONCOLOGY VISIT (OUTPATIENT)
Dept: ONCOLOGY | Facility: CLINIC | Age: 60
End: 2025-07-24
Attending: INTERNAL MEDICINE
Payer: COMMERCIAL

## 2025-07-24 VITALS
SYSTOLIC BLOOD PRESSURE: 143 MMHG | OXYGEN SATURATION: 98 % | DIASTOLIC BLOOD PRESSURE: 98 MMHG | BODY MASS INDEX: 37.11 KG/M2 | RESPIRATION RATE: 16 BRPM | TEMPERATURE: 97.9 F | WEIGHT: 216.2 LBS | HEART RATE: 92 BPM

## 2025-07-24 DIAGNOSIS — D47.2 MGUS (MONOCLONAL GAMMOPATHY OF UNKNOWN SIGNIFICANCE): ICD-10-CM

## 2025-07-24 DIAGNOSIS — D47.2 MGUS (MONOCLONAL GAMMOPATHY OF UNKNOWN SIGNIFICANCE): Primary | ICD-10-CM

## 2025-07-24 LAB
ANION GAP SERPL CALCULATED.3IONS-SCNC: 11 MMOL/L (ref 7–15)
BUN SERPL-MCNC: 16.2 MG/DL (ref 8–23)
CALCIUM SERPL-MCNC: 9.3 MG/DL (ref 8.8–10.4)
CHLORIDE SERPL-SCNC: 103 MMOL/L (ref 98–107)
CREAT SERPL-MCNC: 0.89 MG/DL (ref 0.51–0.95)
EGFRCR SERPLBLD CKD-EPI 2021: 74 ML/MIN/1.73M2
ERYTHROCYTE [DISTWIDTH] IN BLOOD BY AUTOMATED COUNT: 13.2 % (ref 10–15)
GLUCOSE SERPL-MCNC: 141 MG/DL (ref 70–99)
HCO3 SERPL-SCNC: 27 MMOL/L (ref 22–29)
HCT VFR BLD AUTO: 40.2 % (ref 35–47)
HGB BLD-MCNC: 13.2 G/DL (ref 11.7–15.7)
IGG SERPL-MCNC: 1210 MG/DL (ref 610–1616)
KAPPA LC FREE SER-MCNC: 2.5 MG/DL (ref 0.33–1.94)
KAPPA LC FREE/LAMBDA FREE SER NEPH: 1.21 {RATIO} (ref 0.26–1.65)
LAMBDA LC FREE SERPL-MCNC: 2.07 MG/DL (ref 0.57–2.63)
MCH RBC QN AUTO: 28.1 PG (ref 26.5–33)
MCHC RBC AUTO-ENTMCNC: 32.8 G/DL (ref 31.5–36.5)
MCV RBC AUTO: 86 FL (ref 78–100)
PLATELET # BLD AUTO: 290 10E3/UL (ref 150–450)
POTASSIUM SERPL-SCNC: 4.8 MMOL/L (ref 3.4–5.3)
PROT SERPL-MCNC: 6.7 G/DL (ref 6.4–8.3)
RBC # BLD AUTO: 4.69 10E6/UL (ref 3.8–5.2)
SODIUM SERPL-SCNC: 141 MMOL/L (ref 135–145)
WBC # BLD AUTO: 8.1 10E3/UL (ref 4–11)

## 2025-07-24 PROCEDURE — 99213 OFFICE O/P EST LOW 20 MIN: CPT | Performed by: INTERNAL MEDICINE

## 2025-07-24 PROCEDURE — 83521 IG LIGHT CHAINS FREE EACH: CPT | Performed by: INTERNAL MEDICINE

## 2025-07-24 PROCEDURE — 86334 IMMUNOFIX E-PHORESIS SERUM: CPT | Mod: 26 | Performed by: PATHOLOGY

## 2025-07-24 PROCEDURE — 84155 ASSAY OF PROTEIN SERUM: CPT | Performed by: INTERNAL MEDICINE

## 2025-07-24 PROCEDURE — 84165 PROTEIN E-PHORESIS SERUM: CPT | Mod: 26 | Performed by: PATHOLOGY

## 2025-07-24 PROCEDURE — 80048 BASIC METABOLIC PNL TOTAL CA: CPT | Performed by: INTERNAL MEDICINE

## 2025-07-24 PROCEDURE — 36415 COLL VENOUS BLD VENIPUNCTURE: CPT

## 2025-07-24 PROCEDURE — 86334 IMMUNOFIX E-PHORESIS SERUM: CPT | Performed by: PATHOLOGY

## 2025-07-24 PROCEDURE — 85041 AUTOMATED RBC COUNT: CPT | Performed by: INTERNAL MEDICINE

## 2025-07-24 PROCEDURE — 82784 ASSAY IGA/IGD/IGG/IGM EACH: CPT | Performed by: INTERNAL MEDICINE

## 2025-07-24 PROCEDURE — 84165 PROTEIN E-PHORESIS SERUM: CPT | Mod: TC | Performed by: PATHOLOGY

## 2025-07-24 ASSESSMENT — PAIN SCALES - GENERAL: PAINLEVEL_OUTOF10: MODERATE PAIN (6)

## 2025-07-24 NOTE — PROGRESS NOTES
"Oncology Rooming Note    July 24, 2025 8:19 AM   Jimmy Stevens is a 59 year old female who presents for:    Chief Complaint   Patient presents with    Oncology Clinic Visit     Initial Vitals: BP (!) 143/98   Pulse 92   Temp 97.9  F (36.6  C) (Oral)   Resp 16   Wt 98.1 kg (216 lb 3.2 oz)   LMP  (LMP Unknown)   SpO2 98%   BMI 37.11 kg/m   Estimated body mass index is 37.11 kg/m  as calculated from the following:    Height as of 11/21/24: 1.626 m (5' 4\").    Weight as of this encounter: 98.1 kg (216 lb 3.2 oz). Body surface area is 2.1 meters squared.  Moderate Pain (6) Comment: Data Unavailable   No LMP recorded (lmp unknown). Patient has had a hysterectomy.  Allergies reviewed: Yes  Medications reviewed: Yes    Medications: Medication refills not needed today.  Pharmacy name entered into [employer]: [facility] #[id number] - [place], [place] - [address] AT [facility] [address]    PHQ9:  Did this patient require a PHQ9?: No      Clinical concerns: no       Shari J. Schoenberger, CMA            "

## 2025-07-24 NOTE — PROGRESS NOTES
Medical Assistant Note:  Jimmy Stevens presents today for lab draw.    Patient seen by provider today: No.   present during visit today: Not Applicable.    Concerns: No Concerns.    Procedure:  Lab draw site: LAC, Needle type: BF, Gauge: 21. Gauze and coban applied    Post Assessment:  Labs drawn without difficulty: Yes.    Discharge Plan:  Departure Mode: Ambulatory.    Face to Face Time: 5.    Mora Albert CMA

## 2025-07-24 NOTE — PROGRESS NOTES
HEMATOLOGY HISTORY: Ms. Stevens is a female with MGUS.    1.  On 03/08/2022:    -Normal CBC.  -Normal CMP.  2.  On 03/09/2022:    -Normal vitamin B2.  -Normal TSH.  -Normal MMA.  -Normal folate.  -Serum immunofixation reveals possible faint monoclonal IgG lambda.  3.  On 03/11/2022, SPEP reveals monoclonal peak of 0.1.  4.  On 03/23/2022:    -Serum immunofixation reveals very small monoclonal IgG lambda.  -Urine immunofixation reveals very small monoclonal IgG lambda.     SUBJECTIVE:   Ms. Stevens is a 59 year old female with MGUS.  MGUS has been stable.  She is here for follow-up.     She is doing well. No headache.  No dizziness.  No chest pain.  No shortness of breath at rest.  No abdominal pain.  No nausea or vomiting.  Appetite is good.  No urinary or bowel complaints.  No bleeding.  No bone pain.  Some arthritic joint pain.  All other review of system is negative.     PHYSICAL EXAMINATION:   GENERAL:  Alert and oriented x 3.   VITAL SIGNS:  Reviewed.  ECOG PS of 0.     EYES:  No icterus.   NECK:  Supple. No lymphadenopathy. No thyromegaly.   AXILLAE:  No lymphadenopathy.   LUNGS:  Good air entry bilaterally.  No crackles or wheezing.   HEART:  Regular.  No murmur.   GI: Abdomen is soft.  Nontender.  No mass felt.  Difficult palpation because of her weight.  EXTREMITIES: Mild pedal edema.  No calf swelling or tenderness.   SKIN:  No rash.      LABS: Reviewed.     ASSESSMENT:  A 59-year-old female with low risk monoclonal gammopathy of undetermined significance (MGUS).  MGUS is stable.     PLAN:  - Follow-up in 2 years with labs.     DISCUSSION:  1.  Patient is doing well.  Labs were reviewed with her.  MGUS is stable.    Discussed regarding MGUS.  Natural history of disease reviewed.  She has low risk MGUS.  Risk of progression to myeloma is very low.    Discussed regarding follow-up.  Explained to her that we can now monitor her every 2 years with labs.  She is agreeable for it.  Advised her to call us if she  has worsening weakness, bone pain, unexplained weight loss, recurrent infection or any other concerns.    2.  Her blood pressure is mildly elevated.  She is asymptomatic from it.  Will recheck the blood pressure before she leaves.    3.  She had few questions which were all answered.  I will see her in 2 years time.    Total visit time of 30 minutes.  Time spent in today's visit, review of chart/investigations today and documentation today.

## 2025-07-24 NOTE — LETTER
"7/24/2025      Jimmy Stevens  [address]  [facility] [address]      Dear Colleague,    Thank you for referring your patient, Jimmy Stevens, to the [facility]. Please see a copy of my visit note below.    Oncology Rooming Note    July 24, 2025 8:19 AM   Jimmy Stevens is a 59 year old female who presents for:    Chief Complaint   Patient presents with     Oncology Clinic Visit     Initial Vitals: BP (!) 143/98   Pulse 92   Temp 97.9  F (36.6  C) (Oral)   Resp 16   Wt 98.1 kg (216 lb 3.2 oz)   LMP  (LMP Unknown)   SpO2 98%   BMI 37.11 kg/m   Estimated body mass index is 37.11 kg/m  as calculated from the following:    Height as of 11/21/24: 1.626 m (5' 4\").    Weight as of this encounter: 98.1 kg (216 lb 3.2 oz). Body surface area is 2.1 meters squared.  Moderate Pain (6) Comment: Data Unavailable   No LMP recorded (lmp unknown). Patient has had a hysterectomy.  Allergies reviewed: Yes  Medications reviewed: Yes    Medications: Medication refills not needed today.  Pharmacy name entered into [email or web address]: [id number] - [facility] [address] AT [facility] [address]    PHQ9:  Did this patient require a PHQ9?: No      Clinical concerns: no       Shari J. Schoenberger, CMA              HEMATOLOGY HISTORY: Ms. Stevens is a female with MGUS.    1.  On 03/08/2022:    -Normal CBC.  -Normal CMP.  2.  On 03/09/2022:    -Normal vitamin B2.  -Normal TSH.  -Normal MMA.  -Normal folate.  -Serum immunofixation reveals possible faint monoclonal IgG lambda.  3.  On 03/11/2022, SPEP reveals monoclonal peak of 0.1.  4.  On 03/23/2022:    -Serum immunofixation reveals very small monoclonal IgG lambda.  -Urine immunofixation reveals very small monoclonal IgG lambda.     SUBJECTIVE:   Ms. Stevens is a 59 year old female with MGUS.  MGUS has been stable.  She is here for follow-up.     She is doing well. No headache.  No dizziness.  No chest pain.  No shortness " of breath at rest.  No abdominal pain.  No nausea or vomiting.  Appetite is good.  No urinary or bowel complaints.  No bleeding.  No bone pain.  Some arthritic joint pain.  All other review of system is negative.     PHYSICAL EXAMINATION:   GENERAL:  Alert and oriented x 3.   VITAL SIGNS:  Reviewed.  ECOG PS of 0.     EYES:  No icterus.   NECK:  Supple. No lymphadenopathy. No thyromegaly.   AXILLAE:  No lymphadenopathy.   LUNGS:  Good air entry bilaterally.  No crackles or wheezing.   HEART:  Regular.  No murmur.   GI: Abdomen is soft.  Nontender.  No mass felt.  Difficult palpation because of her weight.  EXTREMITIES: Mild pedal edema.  No calf swelling or tenderness.   SKIN:  No rash.      LABS: Reviewed.     ASSESSMENT:  A 59-year-old female with low risk monoclonal gammopathy of undetermined significance (MGUS).  MGUS is stable.     PLAN:  - Follow-up in 2 years with labs.     DISCUSSION:  1.  Patient is doing well.  Labs were reviewed with her.  MGUS is stable.    Discussed regarding MGUS.  Natural history of disease reviewed.  She has low risk MGUS.  Risk of progression to myeloma is very low.    Discussed regarding follow-up.  Explained to her that we can now monitor her every 2 years with labs.  She is agreeable for it.  Advised her to call us if she has worsening weakness, bone pain, unexplained weight loss, recurrent infection or any other concerns.    2.  Her blood pressure is mildly elevated.  She is asymptomatic from it.  Will recheck the blood pressure before she leaves.    3.  She had few questions which were all answered.  I will see her in 2 years time.    Total visit time of 30 minutes.  Time spent in today's visit, review of chart/investigations today and documentation today.    Again, thank you for allowing me to participate in the care of your patient.        Sincerely,        Ronen Ortega MD    Electronically signed

## 2025-07-30 ENCOUNTER — HOSPITAL ENCOUNTER (EMERGENCY)
Facility: CLINIC | Age: 60
Discharge: HOME | End: 2025-07-30
Attending: EMERGENCY MEDICINE | Admitting: EMERGENCY MEDICINE
Payer: COMMERCIAL

## 2025-07-30 VITALS
HEART RATE: 89 BPM | HEIGHT: 64 IN | TEMPERATURE: 98.7 F | SYSTOLIC BLOOD PRESSURE: 152 MMHG | RESPIRATION RATE: 18 BRPM | WEIGHT: 216 LBS | BODY MASS INDEX: 36.88 KG/M2 | OXYGEN SATURATION: 98 % | DIASTOLIC BLOOD PRESSURE: 93 MMHG

## 2025-07-30 DIAGNOSIS — I10 PRIMARY HYPERTENSION: ICD-10-CM

## 2025-07-30 DIAGNOSIS — R60.0 LOWER EXTREMITY EDEMA: Primary | ICD-10-CM

## 2025-07-30 PROCEDURE — 99284 EMERGENCY DEPT VISIT MOD MDM: CPT | Mod: 25 | Performed by: EMERGENCY MEDICINE

## 2025-07-30 PROCEDURE — 250N000013 HC RX MED GY IP 250 OP 250 PS 637: Performed by: EMERGENCY MEDICINE

## 2025-07-30 RX ORDER — LISINOPRIL 10 MG/1
20 TABLET ORAL ONCE
Status: COMPLETED | OUTPATIENT
Start: 2025-07-30 | End: 2025-07-30

## 2025-07-30 RX ORDER — LISINOPRIL 20 MG/1
20 TABLET ORAL 2 TIMES DAILY
Qty: 14 TABLET | Refills: 0 | Status: SHIPPED | OUTPATIENT
Start: 2025-07-30 | End: 2025-08-06

## 2025-07-30 RX ORDER — FUROSEMIDE 40 MG/1
40 TABLET ORAL DAILY
Qty: 4 TABLET | Refills: 0 | Status: SHIPPED | OUTPATIENT
Start: 2025-07-30 | End: 2025-08-03

## 2025-07-30 RX ORDER — AMLODIPINE BESYLATE 5 MG/1
5 TABLET ORAL DAILY
Qty: 7 TABLET | Refills: 0 | Status: SHIPPED | OUTPATIENT
Start: 2025-07-30 | End: 2025-08-06

## 2025-07-30 RX ADMIN — LISINOPRIL 20 MG: 10 TABLET ORAL at 20:35

## 2025-07-30 ASSESSMENT — ACTIVITIES OF DAILY LIVING (ADL)
ADLS_ACUITY_SCORE: 57
ADLS_ACUITY_SCORE: 57

## 2025-07-31 NOTE — DISCHARGE INSTRUCTIONS
Elevate your leg, cut salt out of your diet, but make sure you stay hydrated.  Take the Lasix in the morning for 3 to 4 days and see if it helps reduce the swelling in your leg.  Set an appointment up next week with your primary doctor.  Get your prescriptions filled for 1 week of your antihypertensives and your doctor needs to refill them next week.

## 2025-08-06 ENCOUNTER — VIRTUAL VISIT (OUTPATIENT)
Dept: INTERNAL MEDICINE | Facility: CLINIC | Age: 60
End: 2025-08-06
Payer: COMMERCIAL

## 2025-08-06 DIAGNOSIS — I10 ESSENTIAL HYPERTENSION, BENIGN: ICD-10-CM

## 2025-08-06 DIAGNOSIS — M17.11 PRIMARY OSTEOARTHRITIS OF RIGHT KNEE: ICD-10-CM

## 2025-08-06 DIAGNOSIS — R73.9 HYPERGLYCEMIA: ICD-10-CM

## 2025-08-06 DIAGNOSIS — E66.01 MORBID OBESITY (H): ICD-10-CM

## 2025-08-06 DIAGNOSIS — M79.89 SWELLING OF LEFT LOWER EXTREMITY: Primary | ICD-10-CM

## 2025-08-06 PROCEDURE — 98006 SYNCH AUDIO-VIDEO EST MOD 30: CPT | Performed by: INTERNAL MEDICINE

## 2025-08-06 RX ORDER — LANCETS
EACH MISCELLANEOUS
Qty: 100 EACH | Refills: 6 | Status: SHIPPED | OUTPATIENT
Start: 2025-08-06

## 2025-08-06 RX ORDER — LISINOPRIL 20 MG/1
TABLET ORAL
Qty: 180 TABLET | Refills: 1 | Status: SHIPPED | OUTPATIENT
Start: 2025-08-06

## 2025-08-06 RX ORDER — AMLODIPINE BESYLATE 5 MG/1
5 TABLET ORAL DAILY
Qty: 90 TABLET | Refills: 1 | Status: SHIPPED | OUTPATIENT
Start: 2025-08-06

## 2025-08-06 RX ORDER — IBUPROFEN 600 MG/1
600 TABLET, FILM COATED ORAL EVERY 6 HOURS PRN
Qty: 30 TABLET | Refills: 0 | Status: CANCELLED | OUTPATIENT
Start: 2025-08-06

## 2025-08-06 RX ORDER — METHOCARBAMOL 750 MG/1
750 TABLET, FILM COATED ORAL EVERY 8 HOURS PRN
Qty: 30 TABLET | Refills: 0 | Status: SHIPPED | OUTPATIENT
Start: 2025-08-06

## 2025-08-06 RX ORDER — METHOCARBAMOL 750 MG/1
750 TABLET, FILM COATED ORAL EVERY 8 HOURS PRN
Qty: 30 TABLET | Refills: 0 | Status: CANCELLED | OUTPATIENT
Start: 2025-08-06

## (undated) DEVICE — SUCTION MANIFOLD NEPTUNE 2 SYS 4 PORT 0702-020-000

## (undated) DEVICE — SU VICRYL 1 MO-4 18" J702D

## (undated) DEVICE — GLOVE BIOGEL PI SZ 7.5 40875

## (undated) DEVICE — SU VICRYL 0 MO-4 CR 18" J701D

## (undated) DEVICE — DRAPE STERI U 1015

## (undated) DEVICE — SOL NACL 0.9% IRRIG 3000ML BAG 2B7477

## (undated) DEVICE — SU VICRYL 2-0 CT-1 27" UND J259H

## (undated) DEVICE — SPONGE LAP 18X18" X8435

## (undated) DEVICE — ESU PENCIL SMOKE EVAC W/ROCKER SWITCH 0703-047-000

## (undated) DEVICE — BLADE SAW SAGITTAL STRK 13X90X1.27MM HD SYS 6 6113-127-090

## (undated) DEVICE — LINEN ORTHO ACL PACK 5447

## (undated) DEVICE — SET HANDPIECE INTERPULSE W/COAXIAL FAN SPRAY TIP 0210118000

## (undated) DEVICE — PREP CHLORAPREP 26ML TINTED HI-LITE ORANGE 930815

## (undated) DEVICE — BAG CLEAR TRASH 1.3M 39X33" P4040C

## (undated) DEVICE — TOURNIQUET SGL  BLADDER 30"X4" BLUE 5921030135

## (undated) DEVICE — Device

## (undated) DEVICE — HOOD FLYTE W/PEELAWAY 408-800-100

## (undated) DEVICE — GLOVE BIOGEL PI MICRO INDICATOR UNDERGLOVE SZ 8.0 48980

## (undated) DEVICE — DRSG AQUACEL AG HYDROFIBER  3.5X10" 422605

## (undated) DEVICE — DRSG STERI STRIP 1/2X4" R1547

## (undated) DEVICE — GLOVE BIOGEL PI SZ 8.5 40885

## (undated) DEVICE — SU MONOCRYL 3-0 PS-1 27" Y936H

## (undated) DEVICE — GLOVE BIOGEL PI MICRO INDICATOR UNDERGLOVE SZ 8.5 48985

## (undated) DEVICE — LINEN FULL SHEET 5511

## (undated) RX ORDER — CEFAZOLIN SODIUM/WATER 2 G/20 ML
SYRINGE (ML) INTRAVENOUS
Status: DISPENSED
Start: 2024-05-21

## (undated) RX ORDER — DEXAMETHASONE SODIUM PHOSPHATE 4 MG/ML
INJECTION, SOLUTION INTRA-ARTICULAR; INTRALESIONAL; INTRAMUSCULAR; INTRAVENOUS; SOFT TISSUE
Status: DISPENSED
Start: 2024-05-21

## (undated) RX ORDER — PROPOFOL 10 MG/ML
INJECTION, EMULSION INTRAVENOUS
Status: DISPENSED
Start: 2024-05-21

## (undated) RX ORDER — LABETALOL HYDROCHLORIDE 5 MG/ML
INJECTION, SOLUTION INTRAVENOUS
Status: DISPENSED
Start: 2024-05-21

## (undated) RX ORDER — METOPROLOL TARTRATE 1 MG/ML
INJECTION, SOLUTION INTRAVENOUS
Status: DISPENSED
Start: 2024-05-21

## (undated) RX ORDER — HYDROMORPHONE HCL IN WATER/PF 6 MG/30 ML
PATIENT CONTROLLED ANALGESIA SYRINGE INTRAVENOUS
Status: DISPENSED
Start: 2024-05-21

## (undated) RX ORDER — TRANEXAMIC ACID 650 MG/1
TABLET ORAL
Status: DISPENSED
Start: 2024-05-21

## (undated) RX ORDER — FENTANYL CITRATE 50 UG/ML
INJECTION, SOLUTION INTRAMUSCULAR; INTRAVENOUS
Status: DISPENSED
Start: 2024-05-21

## (undated) RX ORDER — LIDOCAINE HYDROCHLORIDE 10 MG/ML
INJECTION, SOLUTION EPIDURAL; INFILTRATION; INTRACAUDAL; PERINEURAL
Status: DISPENSED
Start: 2024-05-21

## (undated) RX ORDER — DIPHENHYDRAMINE HYDROCHLORIDE 50 MG/ML
INJECTION INTRAMUSCULAR; INTRAVENOUS
Status: DISPENSED
Start: 2024-05-21

## (undated) RX ORDER — FENTANYL CITRATE-0.9 % NACL/PF 10 MCG/ML
PLASTIC BAG, INJECTION (ML) INTRAVENOUS
Status: DISPENSED
Start: 2024-05-21

## (undated) RX ORDER — ONDANSETRON 2 MG/ML
INJECTION INTRAMUSCULAR; INTRAVENOUS
Status: DISPENSED
Start: 2024-05-21